# Patient Record
Sex: FEMALE | Race: WHITE | NOT HISPANIC OR LATINO | Employment: UNEMPLOYED | URBAN - NONMETROPOLITAN AREA
[De-identification: names, ages, dates, MRNs, and addresses within clinical notes are randomized per-mention and may not be internally consistent; named-entity substitution may affect disease eponyms.]

---

## 2017-02-16 ENCOUNTER — OFFICE VISIT (OUTPATIENT)
Dept: URGENT CARE | Facility: PHYSICIAN GROUP | Age: 57
End: 2017-02-16
Payer: COMMERCIAL

## 2017-02-16 ENCOUNTER — HOSPITAL ENCOUNTER (OUTPATIENT)
Dept: RADIOLOGY | Facility: MEDICAL CENTER | Age: 57
End: 2017-02-16
Attending: NURSE PRACTITIONER
Payer: COMMERCIAL

## 2017-02-16 ENCOUNTER — APPOINTMENT (OUTPATIENT)
Dept: RADIOLOGY | Facility: IMAGING CENTER | Age: 57
End: 2017-02-16
Attending: PHYSICIAN ASSISTANT
Payer: COMMERCIAL

## 2017-02-16 VITALS
BODY MASS INDEX: 35.86 KG/M2 | WEIGHT: 229 LBS | DIASTOLIC BLOOD PRESSURE: 86 MMHG | SYSTOLIC BLOOD PRESSURE: 144 MMHG | RESPIRATION RATE: 16 BRPM | HEART RATE: 92 BPM | OXYGEN SATURATION: 96 % | TEMPERATURE: 98.3 F

## 2017-02-16 DIAGNOSIS — F17.200 SMOKER: ICD-10-CM

## 2017-02-16 DIAGNOSIS — R05.9 COUGH: ICD-10-CM

## 2017-02-16 DIAGNOSIS — R13.10 DYSPHAGIA, UNSPECIFIED TYPE: ICD-10-CM

## 2017-02-16 DIAGNOSIS — J18.9 PNEUMONIA OF BOTH LUNGS DUE TO INFECTIOUS ORGANISM, UNSPECIFIED PART OF LUNG: ICD-10-CM

## 2017-02-16 DIAGNOSIS — E05.90 HYPERTHYROIDISM: ICD-10-CM

## 2017-02-16 DIAGNOSIS — J44.9 CHRONIC OBSTRUCTIVE PULMONARY DISEASE, UNSPECIFIED COPD TYPE (HCC): ICD-10-CM

## 2017-02-16 PROCEDURE — 99406 BEHAV CHNG SMOKING 3-10 MIN: CPT | Performed by: PHYSICIAN ASSISTANT

## 2017-02-16 PROCEDURE — 71020 DX-CHEST-2 VIEWS: CPT | Mod: TC | Performed by: PHYSICIAN ASSISTANT

## 2017-02-16 PROCEDURE — 76536 US EXAM OF HEAD AND NECK: CPT

## 2017-02-16 PROCEDURE — 99214 OFFICE O/P EST MOD 30 MIN: CPT | Mod: 25 | Performed by: PHYSICIAN ASSISTANT

## 2017-02-16 RX ORDER — METHYLPREDNISOLONE 4 MG/1
TABLET ORAL
Qty: 1 TAB | Refills: 0 | Status: SHIPPED | OUTPATIENT
Start: 2017-02-16 | End: 2017-09-27

## 2017-02-16 RX ORDER — AZITHROMYCIN 250 MG/1
TABLET, FILM COATED ORAL
Qty: 6 TAB | Refills: 0 | Status: SHIPPED | OUTPATIENT
Start: 2017-02-16 | End: 2017-09-27

## 2017-02-16 NOTE — MR AVS SNAPSHOT
Anetteagustin Villegas   2017 5:25 PM   Office Visit   MRN: [id number]    Department:  [facility]   Dept Phone:  [phone]    Description:  Female : 1960   Provider:  Jose Whitney PA-C           Reason for Visit     Cough           Allergies as of 2017     Allergen Noted Reactions    Bee 2009       Penicillins 2009       Soap 2009       Tape 2009       Tetracycline 2009         You were diagnosed with     Cough   [786.2.ICD-9-CM]       Pneumonia of both lungs due to infectious organism, unspecified part of lung   [7554319]         Vital Signs     Blood Pressure Pulse Temperature Respirations Weight Oxygen Saturation    144/86 mmHg 92 36.8 °C (98.3 °F) 16 103.874 kg (229 lb) 96%    Smoking Status                   Current Every Day Smoker           Basic Information     Date Of Birth Sex Race Ethnicity Preferred Language    1960 Female White Non- English      Health Maintenance        Date Due Completion Dates    IMM DTaP/Tdap/Td Vaccine (1 - Tdap) 1981 ---    PAP SMEAR 1983 ---    COLONOSCOPY 2012 ---    IMM INFLUENZA (1) 2016 ---    MAMMOGRAM 12/3/2016 12/3/2015, 2015, 2006            Current Immunizations     No immunizations on file.      Below and/or attached are the medications your provider expects you to take. Review all of your home medications and newly ordered medications with your provider and/or pharmacist. Follow medication instructions as directed by your provider and/or pharmacist. Please keep your medication list with you and share with your provider. Update the information when medications are discontinued, doses are changed, or new medications (including over-the-counter products) are added; and carry medication information at all times in the event of emergency situations     Allergies:  BEE - (reactions not documented)     PENICILLINS - (reactions not documented)     SOAP - (reactions not  documented)     TAPE - (reactions not documented)     TETRACYCLINE - (reactions not documented)               Medications  Valid as of: February 16, 2017 -  7:01 PM    Generic Name Brand Name Tablet Size Instructions for use    Albuterol   Inhale  by mouth.        Albuterol Sulfate (Nebu Soln) PROVENTIL 2.5mg/3ml 3 mL by Nebulization route every four hours as needed for Shortness of Breath.        Albuterol Sulfate (Nebu Soln) PROVENTIL 2.5mg/3ml 3 mL by Nebulization route every four hours as needed for Shortness of Breath.        Azithromycin (Tab) ZITHROMAX 250 MG Take 2 tabs today, then 1 tab daily.        Benazepril HCl (Tab) LOTENSIN 20 MG Take 1 Tab by mouth every day.        Ciprofloxacin HCl (Tab) CIPRO 500 MG Take 1 Tab by mouth 2 times a day.        Cyclobenzaprine HCl (Tab) FLEXERIL 10 MG Take 1 Tab by mouth 2 times a day.        Cyclobenzaprine HCl   Take  by mouth.        Dicyclomine HCl (Tab) BENTYL 20 MG Take 1 Tab by mouth 4 times a day as needed.        Doxepin HCl (Cap) SINEQUAN 25 MG Take 25 mg by mouth every evening.        Fluticasone Propionate (Suspension) FLONASE 50 MCG/ACT Spray 1 Spray in nose every day.        Hydrocodone-Acetaminophen (Tab) NORCO  MG Take 1 Tab by mouth every 6 hours as needed for Mild Pain.        Hydrocodone-Acetaminophen   Take  by mouth.        Lovastatin (Tab) MEVACOR 40 MG Take 1 Tab by mouth every day.        MethIMAzole (Tab) TAPAZOLE 5 MG Take 0.5 Tabs by mouth every day.        MethylPREDNISolone (Tablet Therapy Pack) MEDROL DOSEPAK 4 MG UAD        MetroNIDAZOLE (Tab) FLAGYL 500 MG Take 1 Tab by mouth 3 times a day.        Montelukast Sodium (Tab) SINGULAIR 10 MG Take 10 mg by mouth every day.        Niacin (Antihyperlipidemic) (Tab CR) NIASPAN 500 MG Take 500 mg by mouth every evening.        Nystatin (Suspension) MYCOSTATIN 723347 UNIT/ML Take 500,000 Units by mouth 4 times a day.        Ofloxacin (Solution) OCUFLOX 0.3 % Place 1 Drop in both eyes 4  times a day.        Omeprazole (CAPSULE DELAYED RELEASE) PRILOSEC 20 MG Take 40 mg by mouth every day.        Ondansetron (TABLET DISPERSIBLE) ZOFRAN ODT 8 MG Take 1 Tab by mouth every 8 hours as needed for Nausea/Vomiting.        Ondansetron (TABLET DISPERSIBLE) ZOFRAN ODT 4 MG Take 1 Tab by mouth every 8 hours as needed for Nausea/Vomiting.        PredniSONE (Tab) DELTASONE 10 MG 2 tabs BID x 2 days, 3 tabs daily x 2 days, 2 tabs daily x 2 days, 1 tab daily x 2 days        Terbinafine HCl (Tab) LAMISIL 250 MG Take 250 mg by mouth every day.        .                 Medicines prescribed today were sent to:     [facility] [address] [place] [address] [address] [place] [address]    Phone: [phone] Fax: [phone]    Open 24 Hours?: No    [facility] [address] [place] [address] [address] [place] [address]    Phone: [phone] Fax: [phone]    Open 24 Hours?: No      Medication refill instructions:       If your prescription bottle indicates you have medication refills left, it is not necessary to call your provider’s office. Please contact your pharmacy and they will refill your medication.    If your prescription bottle indicates you do not have any refills left, you may request refills at any time through one of the following ways: The online [employer] system (except Urgent Care), by calling your provider’s office, or by asking your pharmacy to contact your provider’s office with a refill request. Medication refills are processed only during regular business hours and may not be available until the next business day. Your provider may request additional information or to have a follow-up visit with you prior to refilling your medication.   *Please Note: Medication refills are assigned a new Rx number when refilled electronically. Your pharmacy may indicate that no refills were authorized even though a new prescription for the same medication  is available at the pharmacy. Please request the medicine by name with the pharmacy before contacting your provider for a refill.        Your To Do List     Future Labs/Procedures Complete By Expires    DX-CHEST-2 VIEWS  As directed 2/16/2018         Postling Access Code: [email or web address]  Expires: 3/18/2017  7:01 PM    Postling  A secure, online tool to manage your health information     [employer]’s Postling® is a secure, online tool that connects you to your personalized health information from the privacy of your home -- day or night - making it very easy for you to manage your healthcare. Once the activation process is completed, you can even access your medical information using the Postling yeni, which is available for free in the Apple Yeni store or Google Play store.     Postling provides the following levels of access (as shown below):   My Chart Features   [facility] Doctor [facility]  Specialists [facility]  Urgent  Care Non-[facility]  Primary Care  Doctor   Email your healthcare team securely and privately 24/7 X X X    Manage appointments: schedule your next appointment; view details of past/upcoming appointments X      Request prescription refills. X      View recent personal medical records, including lab and immunizations X X X X   View health record, including health history, allergies, medications X X X X   Read reports about your outpatient visits, procedures, consult and ER notes X X X X   See your discharge summary, which is a recap of your hospital and/or ER visit that includes your diagnosis, lab results, and care plan. X X       How to register for Postling:  1. Go to  [email or web address].  2. Click on the Sign Up Now box, which takes you to the New Member Sign Up page. You will need to provide the following information:  a. Enter your Postling Access Code exactly as it appears at the top of this page. (You will not need to use this code after you’ve completed the sign-up process. If you do  not sign up before the expiration date, you must request a new code.)   b. Enter your date of birth.   c. Enter your home email address.   d. Click Submit, and follow the next screen’s instructions.  3. Create a [employer] ID. This will be your [employer] login ID and cannot be changed, so think of one that is secure and easy to remember.  4. Create a [employer] password. You can change your password at any time.  5. Enter your Password Reset Question and Answer. This can be used at a later time if you forget your password.   6. Enter your e-mail address. This allows you to receive e-mail notifications when new information is available in [employer].  7. Click Sign Up. You can now view your health information.    For assistance activating your [employer] account, call [phone]

## 2017-02-17 ASSESSMENT — ENCOUNTER SYMPTOMS
EYE DISCHARGE: 0
FEVER: 1
NAUSEA: 0
NECK PAIN: 0
DIZZINESS: 0
SORE THROAT: 1
HEADACHES: 0
VOMITING: 0
EYE REDNESS: 0
SPUTUM PRODUCTION: 1
CHILLS: 0
ABDOMINAL PAIN: 0
MYALGIAS: 0
COUGH: 1
TINGLING: 0
PALPITATIONS: 0
WHEEZING: 1
HEMOPTYSIS: 0
SHORTNESS OF BREATH: 0

## 2017-02-17 ASSESSMENT — COPD QUESTIONNAIRES: COPD: 1

## 2017-02-18 NOTE — PROGRESS NOTES
"Subjective:      Anette Villegas is a 57 y.o. female who presents with Cough            Cough  This is a new problem. Episode onset: 3 days. The problem has been gradually worsening. The problem occurs every few minutes. The cough is productive of sputum. Associated symptoms include ear congestion, a fever, nasal congestion, a sore throat and wheezing. Pertinent negatives include no chest pain, chills, ear pain, eye redness, headaches, hemoptysis, myalgias, rash or shortness of breath. The symptoms are aggravated by cold air, exercise and dust. She has tried a beta-agonist inhaler and OTC cough suppressant for the symptoms. The treatment provided mild relief. Her past medical history is significant for bronchitis, COPD and pneumonia.   Pt was concerned today as she recently \"got over PNE\".     Review of Systems   Constitutional: Positive for fever and malaise/fatigue. Negative for chills.   HENT: Positive for congestion and sore throat. Negative for ear discharge and ear pain.    Eyes: Negative for discharge and redness.   Respiratory: Positive for cough, sputum production and wheezing. Negative for hemoptysis and shortness of breath.    Cardiovascular: Negative for chest pain, palpitations and leg swelling.   Gastrointestinal: Negative for nausea, vomiting and abdominal pain.   Genitourinary: Negative for dysuria and urgency.   Musculoskeletal: Negative for myalgias and neck pain.   Skin: Negative for itching and rash.   Neurological: Negative for dizziness, tingling and headaches.          Objective:     /86 mmHg  Pulse 92  Temp(Src) 36.8 °C (98.3 °F)  Resp 16  Wt 103.874 kg (229 lb)  SpO2 96%   PMH:  has a past medical history of Arthritis; Personal history of venous thrombosis and embolism (6/23/2009); Dental disorder; Grave's disease; Pain; Hypertension; and Chronic airway obstruction, not elsewhere classified (CMS-HCC).  MEDS:   Current outpatient prescriptions:   •  azithromycin (ZITHROMAX) 250 " MG Tab, Take 2 tabs today, then 1 tab daily., Disp: 6 Tab, Rfl: 0  •  MethylPREDNISolone (MEDROL DOSEPAK) 4 MG Tablet Therapy Pack, UAD, Disp: 1 Tab, Rfl: 0  •  HYDROCODONE-ACETAMINOPHEN PO, Take  by mouth., Disp: , Rfl:   •  Cyclobenzaprine HCl (FLEXERIL PO), Take  by mouth., Disp: , Rfl:   •  montelukast (SINGULAIR) 10 MG Tab, Take 10 mg by mouth every day., Disp: , Rfl:   •  fluticasone (FLONASE) 50 MCG/ACT nasal spray, Spray 1 Spray in nose every day., Disp: , Rfl:   •  Albuterol (VENTOLIN INH), Inhale  by mouth., Disp: , Rfl:   •  niacin SR (NIASPAN) 500 MG Tab CR, Take 500 mg by mouth every evening., Disp: , Rfl:   •  nystatin (MYCOSTATIN) 507803 UNIT/ML Suspension, Take 500,000 Units by mouth 4 times a day., Disp: , Rfl:   •  ofloxacin (OCUFLOX) 0.3 % Solution, Place 1 Drop in both eyes 4 times a day., Disp: , Rfl:   •  doxepin (SINEQUAN) 25 MG Cap, Take 25 mg by mouth every evening., Disp: , Rfl:   •  terbinafine (LAMISIL) 250 MG Tab, Take 250 mg by mouth every day., Disp: , Rfl:   •  ciprofloxacin (CIPRO) 500 MG Tab, Take 1 Tab by mouth 2 times a day., Disp: 20 Tab, Rfl: 0  •  metronidazole (FLAGYL) 500 MG Tab, Take 1 Tab by mouth 3 times a day., Disp: 30 Tab, Rfl: 0  •  ondansetron (ZOFRAN ODT) 4 MG TABLET DISPERSIBLE, Take 1 Tab by mouth every 8 hours as needed for Nausea/Vomiting., Disp: 20 Tab, Rfl: 0  •  albuterol (PROVENTIL) 2.5mg/3ml Nebu Soln solution for nebulization, 3 mL by Nebulization route every four hours as needed for Shortness of Breath., Disp: 75 mL, Rfl: 0  •  predniSONE (DELTASONE) 10 MG Tab, 2 tabs BID x 2 days, 3 tabs daily x 2 days, 2 tabs daily x 2 days, 1 tab daily x 2 days, Disp: 20 Tab, Rfl: 0  •  ondansetron (ZOFRAN ODT) 8 MG TABLET DISPERSIBLE, Take 1 Tab by mouth every 8 hours as needed for Nausea/Vomiting., Disp: 15 Tab, Rfl: 0  •  albuterol (PROVENTIL) 2.5mg/3ml Nebu Soln solution for nebulization, 3 mL by Nebulization route every four hours as needed for Shortness of  Breath., Disp: 75 mL, Rfl: 0  •  dicyclomine (BENTYL) 20 MG TABS, Take 1 Tab by mouth 4 times a day as needed., Disp: 20 Tab, Rfl: 0  •  omeprazole (PRILOSEC) 20 MG CPDR, Take 40 mg by mouth every day., Disp: , Rfl:   •  METHIMAZOLE 5 MG PO TABS, Take 0.5 Tabs by mouth every day., Disp: , Rfl:   •  BENAZEPRIL HCL 20 MG PO TABS, Take 1 Tab by mouth every day., Disp: , Rfl:   •  LOVASTATIN 40 MG PO TABS, Take 1 Tab by mouth every day., Disp: , Rfl:   •  CYCLOBENZAPRINE HCL 10 MG PO TABS, Take 1 Tab by mouth 2 times a day., Disp: , Rfl:   •  HYDROCODONE-ACETAMINOPHEN  MG PO TABS, Take 1 Tab by mouth every 6 hours as needed for Mild Pain., Disp: , Rfl:   ALLERGIES:   Allergies   Allergen Reactions   • Bee    • Penicillins    • Soap    • Tape    • Tetracycline      SURGHX:   Past Surgical History   Procedure Laterality Date   • Tubal ligation  1994   • Hysterectomy, vaginal  1999   • Pr diskectomy,percutaneous lumbar  2001   • Lumbar fusion posterior  2004   • Tonsillectomy  1968   • Acl reconstruction scope  7/9/2009     Performed by [facility]ENEE L[facility]I at [facility]     SOCHX:  reports that she has been smoking Cigarettes.  She has been smoking about 1.00 pack per day. She has never used smokeless tobacco. She reports that she does not drink alcohol or use illicit drugs.  FH: Family history was reviewed, no pertinent findings to report    Physical Exam   Constitutional: She is oriented to person, place, and time. She appears well-developed and well-nourished.   HENT:   Head: Normocephalic and atraumatic.   Right Ear: External ear normal.   Left Ear: External ear normal.   Nose: Nose normal.   Mouth/Throat: Oropharynx is clear and moist. No oropharyngeal exudate.   Eyes: EOM are normal. Pupils are equal, round, and reactive to light.   Neck: Normal range of motion. Neck supple.   Cardiovascular: Normal rate and regular rhythm.    No murmur heard.  Pulmonary/Chest: Effort normal. No respiratory distress.    Diffuse wheezing, with noted rhonchi to LLL field.    Musculoskeletal: Normal range of motion.   Lymphadenopathy:     She has no cervical adenopathy.   Neurological: She is oriented to person, place, and time.   Skin: Skin is warm. No rash noted.   Psychiatric: She has a normal mood and affect. Her behavior is normal.   Vitals reviewed.       CXR:    The cardiomediastinal silhouette is stable. No focal consolidation, pleural effusion or pneumothorax is identified.  Costophrenic angles are sharp. There is hazy retrocardiac airspace opacity.     Assessment/Plan:     1. Pneumonia of both lungs due to infectious organism, unspecified part of lung  - azithromycin (ZITHROMAX) 250 MG Tab; Take 2 tabs today, then 1 tab daily.  Dispense: 6 Tab; Refill: 0  - MethylPREDNISolone (MEDROL DOSEPAK) 4 MG Tablet Therapy Pack; UAD  Dispense: 1 Tab; Refill: 0    2. Cough  - DX-CHEST-2 VIEWS; Future  - azithromycin (ZITHROMAX) 250 MG Tab; Take 2 tabs today, then 1 tab daily.  Dispense: 6 Tab; Refill: 0  - MethylPREDNISolone (MEDROL DOSEPAK) 4 MG Tablet Therapy Pack; UAD  Dispense: 1 Tab; Refill: 0    3. Chronic obstructive pulmonary disease, unspecified COPD type (CMS-HCC)  4. Smoker  Smoking cessation was discussed today for longer than 3 min. OTC Smoking cessation aids were discussed and pt. will consider such, however is not ready to quit at this time.     Will treat with Zpak, steroids, and pt. Is to continue with the use of home nebulizers. Pt. Is to keep appt. With PCP next week for recheck.   Patient given precautionary s/sx that mandate immediate follow up and evaluation in the ED. Advised of risks of not doing so.    DDX, Supportive care, and indications for immediate follow-up discussed with patient.    Instructed to return to clinic or nearest emergency department if we are not available for any change in condition, further concerns, or worsening of symptoms.    The patient demonstrated a good understanding and agreed with  the treatment plan.

## 2017-03-02 ENCOUNTER — OFFICE VISIT (OUTPATIENT)
Dept: URGENT CARE | Facility: PHYSICIAN GROUP | Age: 57
End: 2017-03-02
Payer: COMMERCIAL

## 2017-03-02 ENCOUNTER — HOSPITAL ENCOUNTER (OUTPATIENT)
Dept: RADIOLOGY | Facility: MEDICAL CENTER | Age: 57
End: 2017-03-02
Attending: EMERGENCY MEDICINE
Payer: COMMERCIAL

## 2017-03-02 VITALS
BODY MASS INDEX: 35.86 KG/M2 | WEIGHT: 229 LBS | DIASTOLIC BLOOD PRESSURE: 80 MMHG | OXYGEN SATURATION: 98 % | RESPIRATION RATE: 16 BRPM | TEMPERATURE: 97.3 F | HEART RATE: 104 BPM | SYSTOLIC BLOOD PRESSURE: 134 MMHG

## 2017-03-02 DIAGNOSIS — R05.9 COUGH: ICD-10-CM

## 2017-03-02 DIAGNOSIS — J01.01 ACUTE RECURRENT MAXILLARY SINUSITIS: ICD-10-CM

## 2017-03-02 DIAGNOSIS — F17.210 CIGARETTE SMOKER: ICD-10-CM

## 2017-03-02 DIAGNOSIS — Z87.01 HISTORY OF PNEUMONIA: ICD-10-CM

## 2017-03-02 DIAGNOSIS — J45.31 MILD PERSISTENT ASTHMA WITH ACUTE EXACERBATION: ICD-10-CM

## 2017-03-02 PROCEDURE — 99214 OFFICE O/P EST MOD 30 MIN: CPT | Performed by: EMERGENCY MEDICINE

## 2017-03-02 PROCEDURE — 71020 DX-CHEST-2 VIEWS: CPT

## 2017-03-02 RX ORDER — FLUTICASONE PROPIONATE 220 UG/1
1 AEROSOL, METERED RESPIRATORY (INHALATION) 2 TIMES DAILY
Qty: 1 INHALER | Refills: 0 | Status: SHIPPED | OUTPATIENT
Start: 2017-03-02 | End: 2018-11-17

## 2017-03-02 RX ORDER — PREDNISONE 20 MG/1
40 TABLET ORAL DAILY
Qty: 14 TAB | Refills: 0 | Status: SHIPPED | OUTPATIENT
Start: 2017-03-02 | End: 2017-03-09

## 2017-03-02 RX ORDER — SULFAMETHOXAZOLE AND TRIMETHOPRIM 800; 160 MG/1; MG/1
1 TABLET ORAL 2 TIMES DAILY
Qty: 14 TAB | Refills: 0 | Status: SHIPPED | OUTPATIENT
Start: 2017-03-02 | End: 2017-03-09

## 2017-03-02 ASSESSMENT — ENCOUNTER SYMPTOMS
SWEATS: 0
CHILLS: 0
SORE THROAT: 1
SPUTUM PRODUCTION: 1
HEADACHES: 1
HEARTBURN: 0
WEIGHT LOSS: 0
RHINORRHEA: 1
FEVER: 0
ABDOMINAL PAIN: 0
VOMITING: 0
DIARRHEA: 0
SHORTNESS OF BREATH: 1
NAUSEA: 0
HEMOPTYSIS: 0
COUGH: 1
WHEEZING: 1

## 2017-03-02 NOTE — PATIENT INSTRUCTIONS
Avoid smoking!  Cessation is highly recommended, but at least attempt to reduce frequency of smoking until the illness resolves.  Use saline nasal irrigation, such as with a Neti Pot, as needed daily for relief of nasal or sinus congestion relief.  Continue inhaled nasal steroid (Flonase, Nasonex, Rhinocort, Nasacort) daily; continue for at least 2-3 weeks.   Use an oral probiotic daily, such as Culturelle, Align, or yogurt to reduce gastrointestinal symptoms.  You should contact a primary care provider for follow-up as soon as available.  Go to the nearest hospital Emergency Department, or call 911, if any worsening condition.    Smoking Cessation, Tips for Success  If you are ready to quit smoking, congratulations! You have chosen to help yourself be healthier. Cigarettes bring nicotine, tar, carbon monoxide, and other irritants into your body. Your lungs, heart, and blood vessels will be able to work better without these poisons. There are many different ways to quit smoking. Nicotine gum, nicotine patches, a nicotine inhaler, or nicotine nasal spray can help with physical craving. Hypnosis, support groups, and medicines help break the habit of smoking.  WHAT THINGS CAN I DO TO MAKE QUITTING EASIER?   Here are some tips to help you quit for good:  · Pick a date when you will quit smoking completely. Tell all of your friends and family about your plan to quit on that date.  · Do not try to slowly cut down on the number of cigarettes you are smoking. Pick a quit date and quit smoking completely starting on that day.  · Throw away all cigarettes.    · Clean and remove all ashtrays from your home, work, and car.  · On a card, write down your reasons for quitting. Carry the card with you and read it when you get the urge to smoke.  · Cleanse your body of nicotine. Drink enough water and fluids to keep your urine clear or pale yellow. Do this after quitting to flush the nicotine from your body.  · Learn to predict your  "moods. Do not let a bad situation be your excuse to have a cigarette. Some situations in your life might tempt you into wanting a cigarette.  · Never have \"just one\" cigarette. It leads to wanting another and another. Remind yourself of your decision to quit.  · Change habits associated with smoking. If you smoked while driving or when feeling stressed, try other activities to replace smoking. Stand up when drinking your coffee. Brush your teeth after eating. Sit in a different chair when you read the paper. Avoid alcohol while trying to quit, and try to drink fewer caffeinated beverages. Alcohol and caffeine may urge you to smoke.  · Avoid foods and drinks that can trigger a desire to smoke, such as sugary or spicy foods and alcohol.  · Ask people who smoke not to smoke around you.  · Have something planned to do right after eating or having a cup of coffee. For example, plan to take a walk or exercise.  · Try a relaxation exercise to calm you down and decrease your stress. Remember, you may be tense and nervous for the first 2 weeks after you quit, but this will pass.  · Find new activities to keep your hands busy. Play with a pen, coin, or rubber band. Doodle or draw things on paper.  · Brush your teeth right after eating. This will help cut down on the craving for the taste of tobacco after meals. You can also try mouthwash.    · Use oral substitutes in place of cigarettes. Try using lemon drops, carrots, cinnamon sticks, or chewing gum. Keep them handy so they are available when you have the urge to smoke.  · When you have the urge to smoke, try deep breathing.  · Designate your home as a nonsmoking area.  · If you are a heavy smoker, ask your health care provider about a prescription for nicotine chewing gum. It can ease your withdrawal from nicotine.  · Reward yourself. Set aside the cigarette money you save and buy yourself something nice.  · Look for support from others. Join a support group or smoking " "cessation program. Ask someone at home or at work to help you with your plan to quit smoking.  · Always ask yourself, \"Do I need this cigarette or is this just a reflex?\" Tell yourself, \"Today, I choose not to smoke,\" or \"I do not want to smoke.\" You are reminding yourself of your decision to quit.  · Do not replace cigarette smoking with electronic cigarettes (commonly called e-cigarettes). The safety of e-cigarettes is unknown, and some may contain harmful chemicals.  · If you relapse, do not give up! Plan ahead and think about what you will do the next time you get the urge to smoke.  HOW WILL I FEEL WHEN I QUIT SMOKING?  You may have symptoms of withdrawal because your body is used to nicotine (the addictive substance in cigarettes). You may crave cigarettes, be irritable, feel very hungry, cough often, get headaches, or have difficulty concentrating. The withdrawal symptoms are only temporary. They are strongest when you first quit but will go away within 10-14 days. When withdrawal symptoms occur, stay in control. Think about your reasons for quitting. Remind yourself that these are signs that your body is healing and getting used to being without cigarettes. Remember that withdrawal symptoms are easier to treat than the major diseases that smoking can cause.   Even after the withdrawal is over, expect periodic urges to smoke. However, these cravings are generally short lived and will go away whether you smoke or not. Do not smoke!  WHAT RESOURCES ARE AVAILABLE TO HELP ME QUIT SMOKING?  Your health care provider can direct you to community resources or hospitals for support, which may include:  · Group support.  · Education.  · Hypnosis.  · Therapy.     This information is not intended to replace advice given to you by your health care provider. Make sure you discuss any questions you have with your health care provider.     Document Released: 09/15/2005 Document Revised: 01/08/2016 Document Reviewed: " 06/05/2014  [employer] Interactive Patient Education ©2016 [employer]  Sinusitis, Adult  Sinusitis is redness, soreness, and inflammation of the paranasal sinuses. Paranasal sinuses are air pockets within the bones of your face. They are located beneath your eyes, in the middle of your forehead, and above your eyes. In healthy paranasal sinuses, mucus is able to drain out, and air is able to circulate through them by way of your nose. However, when your paranasal sinuses are inflamed, mucus and air can become trapped. This can allow bacteria and other germs to grow and cause infection.  Sinusitis can develop quickly and last only a short time (acute) or continue over a long period (chronic). Sinusitis that lasts for more than 12 weeks is considered chronic.  CAUSES  Causes of sinusitis include:  · Allergies.  · Structural abnormalities, such as displacement of the cartilage that separates your nostrils (deviated septum), which can decrease the air flow through your nose and sinuses and affect sinus drainage.  · Functional abnormalities, such as when the small hairs (cilia) that line your sinuses and help remove mucus do not work properly or are not present.  SIGNS AND SYMPTOMS  Symptoms of acute and chronic sinusitis are the same. The primary symptoms are pain and pressure around the affected sinuses. Other symptoms include:  · Upper toothache.  · Earache.  · Headache.  · Bad breath.  · Decreased sense of smell and taste.  · A cough, which worsens when you are lying flat.  · Fatigue.  · Fever.  · Thick drainage from your nose, which often is green and may contain pus (purulent).  · Swelling and warmth over the affected sinuses.  DIAGNOSIS  Your health care provider will perform a physical exam. During your exam, your health care provider may perform any of the following to help determine if you have acute sinusitis or chronic sinusitis:  · Look in your nose for signs of abnormal growths in your nostrils (nasal  polyps).  · Tap over the affected sinus to check for signs of infection.  · View the inside of your sinuses using an imaging device that has a light attached (endoscope).  If your health care provider suspects that you have chronic sinusitis, one or more of the following tests may be recommended:  · Allergy tests.  · Nasal culture. A sample of mucus is taken from your nose, sent to a lab, and screened for bacteria.  · Nasal cytology. A sample of mucus is taken from your nose and examined by your health care provider to determine if your sinusitis is related to an allergy.  TREATMENT  Most cases of acute sinusitis are related to a viral infection and will resolve on their own within 10 days. Sometimes, medicines are prescribed to help relieve symptoms of both acute and chronic sinusitis. These may include pain medicines, decongestants, nasal steroid sprays, or saline sprays.  However, for sinusitis related to a bacterial infection, your health care provider will prescribe antibiotic medicines. These are medicines that will help kill the bacteria causing the infection.  Rarely, sinusitis is caused by a fungal infection. In these cases, your health care provider will prescribe antifungal medicine.  For some cases of chronic sinusitis, surgery is needed. Generally, these are cases in which sinusitis recurs more than 3 times per year, despite other treatments.  HOME CARE INSTRUCTIONS  · Drink plenty of water. Water helps thin the mucus so your sinuses can drain more easily.  · Use a humidifier.  · Inhale steam 3-4 times a day (for example, sit in the bathroom with the shower running).  · Apply a warm, moist washcloth to your face 3-4 times a day, or as directed by your health care provider.  · Use saline nasal sprays to help moisten and clean your sinuses.  · Take medicines only as directed by your health care provider.  · If you were prescribed either an antibiotic or antifungal medicine, finish it all even if you start  to feel better.  SEEK IMMEDIATE MEDICAL CARE IF:  · You have increasing pain or severe headaches.  · You have nausea, vomiting, or drowsiness.  · You have swelling around your face.  · You have vision problems.  · You have a stiff neck.  · You have difficulty breathing.     This information is not intended to replace advice given to you by your health care provider. Make sure you discuss any questions you have with your health care provider.     Document Released: 12/18/2006 Document Revised: 01/08/2016 Document Reviewed: 01/01/2013  [employer] Interactive Patient Education ©2016 [employer]    Asthma, Acute Bronchospasm  Acute bronchospasm caused by asthma is also referred to as an asthma attack. Bronchospasm means your air passages become narrowed. The narrowing is caused by inflammation and tightening of the muscles in the air tubes (bronchi) in your lungs. This can make it hard to breathe or cause you to wheeze and cough.  CAUSES  Possible triggers are:  · Animal dander from the skin, hair, or feathers of animals.  · Dust mites contained in house dust.  · Cockroaches.  · Pollen from trees or grass.  · Mold.  · Cigarette or tobacco smoke.  · Air pollutants such as dust, household , hair sprays, aerosol sprays, paint fumes, strong chemicals, or strong odors.  · Cold air or weather changes. Cold air may trigger inflammation. Winds increase molds and pollens in the air.  · Strong emotions such as crying or laughing hard.  · Stress.  · Certain medicines such as aspirin or beta-blockers.  · Sulfites in foods and drinks, such as dried fruits and wine.  · Infections or inflammatory conditions, such as a flu, cold, or inflammation of the nasal membranes (rhinitis).  · Gastroesophageal reflux disease (GERD). GERD is a condition where stomach acid backs up into your esophagus.  · Exercise or strenuous activity.  SIGNS AND SYMPTOMS   · Wheezing.  · Excessive coughing, particularly at night.  · Chest  tightness.  · Shortness of breath.  DIAGNOSIS   Your health care provider will ask you about your medical history and perform a physical exam. A chest X-ray or blood testing may be performed to look for other causes of your symptoms or other conditions that may have triggered your asthma attack.   TREATMENT   Treatment is aimed at reducing inflammation and opening up the airways in your lungs.  Most asthma attacks are treated with inhaled medicines. These include quick relief or rescue medicines (such as bronchodilators) and controller medicines (such as inhaled corticosteroids). These medicines are sometimes given through an inhaler or a nebulizer. Systemic steroid medicine taken by mouth or given through an IV tube also can be used to reduce the inflammation when an attack is moderate or severe. Antibiotic medicines are only used if a bacterial infection is present.   HOME CARE INSTRUCTIONS   · Rest.  · Drink plenty of liquids. This helps the mucus to remain thin and be easily coughed up. Only use caffeine in moderation and do not use alcohol until you have recovered from your illness.  · Do not smoke. Avoid being exposed to secondhand smoke.  · You play a critical role in keeping yourself in good health. Avoid exposure to things that cause you to wheeze or to have breathing problems.  · Keep your medicines up-to-date and available. Carefully follow your health care provider's treatment plan.  · Take your medicine exactly as prescribed.  · When pollen or pollution is bad, keep windows closed and use an air conditioner or go to places with air conditioning.  · Asthma requires careful medical care. See your health care provider for a follow-up as advised. If you are more than 24 weeks pregnant and you were prescribed any new medicines, let your obstetrician know about the visit and how you are doing. Follow up with your health care provider as directed.  · After you have recovered from your asthma attack, make an  appointment with your outpatient doctor to talk about ways to reduce the likelihood of future attacks. If you do not have a doctor who manages your asthma, make an appointment with a primary care doctor to discuss your asthma.  SEEK IMMEDIATE MEDICAL CARE IF:   · You are getting worse.  · You have trouble breathing. If severe, call your local emergency services (911 in the [place]  · You develop chest pain or discomfort.  · You are vomiting.  · You are not able to keep fluids down.  · You are coughing up yellow, green, brown, or bloody sputum.  · You have a fever and your symptoms suddenly get worse.  · You have trouble swallowing.  MAKE SURE YOU:   · Understand these instructions.  · Will watch your condition.  · Will get help right away if you are not doing well or get worse.     This information is not intended to replace advice given to you by your health care provider. Make sure you discuss any questions you have with your health care provider.     Document Released: 04/03/2008 Document Revised: 12/23/2014 Document Reviewed: 06/25/2014  [employer] Interactive Patient Education ©2016 [employer]

## 2017-03-03 NOTE — PROGRESS NOTES
Subjective:      Anette Villegas is a 57 y.o. female who presents with Follow-Up            Cough  Episode onset: 3 months. The problem has been waxing and waning. The cough is productive of purulent sputum. Associated symptoms include headaches, nasal congestion, postnasal drip, rhinorrhea, a sore throat, shortness of breath and wheezing. Pertinent negatives include no chest pain, chills, ear congestion, ear pain, fever, heartburn, hemoptysis, rash, sweats or weight loss. The symptoms are aggravated by lying down. Risk factors for lung disease include smoking/tobacco exposure. She has tried a beta-agonist inhaler and oral steroids (z-shahzad) for the symptoms. The treatment provided mild relief. Her past medical history is significant for asthma, bronchitis, environmental allergies and pneumonia.    notes partial improvement on azithromycin from last visit; was advised that chest x-ray was not normal. States PCP advised follow-up for repeat chest x-ray   Review of Systems   Constitutional: Negative for fever, chills and weight loss.   HENT: Positive for congestion, postnasal drip, rhinorrhea and sore throat. Negative for ear discharge, ear pain and nosebleeds.    Respiratory: Positive for cough, sputum production, shortness of breath and wheezing. Negative for hemoptysis.    Cardiovascular: Negative for chest pain and leg swelling.   Gastrointestinal: Negative for heartburn, nausea, vomiting, abdominal pain and diarrhea.   Skin: Negative for rash.   Neurological: Positive for headaches.   Endo/Heme/Allergies: Positive for environmental allergies.     PMH:  has a past medical history of Arthritis; Personal history of venous thrombosis and embolism (6/23/2009); Dental disorder; Grave's disease; Pain; Hypertension; and Chronic airway obstruction, not elsewhere classified (CMS-HCC).  MEDS:   Current outpatient prescriptions:   •  sulfamethoxazole-trimethoprim (BACTRIM DS) 800-160 MG tablet, Take 1 Tab by mouth 2 times a  day for 7 days., Disp: 14 Tab, Rfl: 0  •  predniSONE (DELTASONE) 20 MG Tab, Take 2 Tabs by mouth every day for 7 days., Disp: 14 Tab, Rfl: 0  •  fluticasone (FLOVENT HFA) 220 MCG/ACT Aerosol, Inhale 1 Puff by mouth 2 times a day., Disp: 1 Inhaler, Rfl: 0  •  azithromycin (ZITHROMAX) 250 MG Tab, Take 2 tabs today, then 1 tab daily., Disp: 6 Tab, Rfl: 0  •  MethylPREDNISolone (MEDROL DOSEPAK) 4 MG Tablet Therapy Pack, UAD, Disp: 1 Tab, Rfl: 0  •  HYDROCODONE-ACETAMINOPHEN PO, Take  by mouth., Disp: , Rfl:   •  Cyclobenzaprine HCl (FLEXERIL PO), Take  by mouth., Disp: , Rfl:   •  montelukast (SINGULAIR) 10 MG Tab, Take 10 mg by mouth every day., Disp: , Rfl:   •  fluticasone (FLONASE) 50 MCG/ACT nasal spray, Spray 1 Spray in nose every day., Disp: , Rfl:   •  Albuterol (VENTOLIN INH), Inhale  by mouth., Disp: , Rfl:   •  niacin SR (NIASPAN) 500 MG Tab CR, Take 500 mg by mouth every evening., Disp: , Rfl:   •  nystatin (MYCOSTATIN) 407585 UNIT/ML Suspension, Take 500,000 Units by mouth 4 times a day., Disp: , Rfl:   •  ofloxacin (OCUFLOX) 0.3 % Solution, Place 1 Drop in both eyes 4 times a day., Disp: , Rfl:   •  doxepin (SINEQUAN) 25 MG Cap, Take 25 mg by mouth every evening., Disp: , Rfl:   •  terbinafine (LAMISIL) 250 MG Tab, Take 250 mg by mouth every day., Disp: , Rfl:   •  ciprofloxacin (CIPRO) 500 MG Tab, Take 1 Tab by mouth 2 times a day., Disp: 20 Tab, Rfl: 0  •  metronidazole (FLAGYL) 500 MG Tab, Take 1 Tab by mouth 3 times a day., Disp: 30 Tab, Rfl: 0  •  ondansetron (ZOFRAN ODT) 4 MG TABLET DISPERSIBLE, Take 1 Tab by mouth every 8 hours as needed for Nausea/Vomiting., Disp: 20 Tab, Rfl: 0  •  albuterol (PROVENTIL) 2.5mg/3ml Nebu Soln solution for nebulization, 3 mL by Nebulization route every four hours as needed for Shortness of Breath., Disp: 75 mL, Rfl: 0  •  predniSONE (DELTASONE) 10 MG Tab, 2 tabs BID x 2 days, 3 tabs daily x 2 days, 2 tabs daily x 2 days, 1 tab daily x 2 days, Disp: 20 Tab, Rfl: 0  •   ondansetron (ZOFRAN ODT) 8 MG TABLET DISPERSIBLE, Take 1 Tab by mouth every 8 hours as needed for Nausea/Vomiting., Disp: 15 Tab, Rfl: 0  •  albuterol (PROVENTIL) 2.5mg/3ml Nebu Soln solution for nebulization, 3 mL by Nebulization route every four hours as needed for Shortness of Breath., Disp: 75 mL, Rfl: 0  •  dicyclomine (BENTYL) 20 MG TABS, Take 1 Tab by mouth 4 times a day as needed., Disp: 20 Tab, Rfl: 0  •  omeprazole (PRILOSEC) 20 MG CPDR, Take 40 mg by mouth every day., Disp: , Rfl:   •  METHIMAZOLE 5 MG PO TABS, Take 0.5 Tabs by mouth every day., Disp: , Rfl:   •  BENAZEPRIL HCL 20 MG PO TABS, Take 1 Tab by mouth every day., Disp: , Rfl:   •  LOVASTATIN 40 MG PO TABS, Take 1 Tab by mouth every day., Disp: , Rfl:   •  CYCLOBENZAPRINE HCL 10 MG PO TABS, Take 1 Tab by mouth 2 times a day., Disp: , Rfl:   •  HYDROCODONE-ACETAMINOPHEN  MG PO TABS, Take 1 Tab by mouth every 6 hours as needed for Mild Pain., Disp: , Rfl:   ALLERGIES:   Allergies   Allergen Reactions   • Bee    • Penicillins    • Soap    • Tape    • Tetracycline      SURGHX:   Past Surgical History   Procedure Laterality Date   • Tubal ligation  1994   • Hysterectomy, vaginal  1999   • Pr diskectomy,percutaneous lumbar  2001   • Lumbar fusion posterior  2004   • Tonsillectomy  1968   • Acl reconstruction scope  7/9/2009     Performed by RENEE LEVI at SURGERY [facility]     SOCHX:  reports that she has been smoking Cigarettes.  She has been smoking about 1.00 pack per day. She has never used smokeless tobacco. She reports that she does not drink alcohol or use illicit drugs.  FH: family history includes Cancer in her maternal aunt and maternal grandmother.       Objective:     /80 mmHg  Pulse 104  Temp(Src) 36.3 °C (97.3 °F)  Resp 16  Wt 103.874 kg (229 lb)  SpO2 98%     Physical Exam   Constitutional: She appears well-developed and well-nourished. She is cooperative.  Non-toxic appearance. She does not have a sickly  appearance. No distress.   HENT:   Right Ear: Tympanic membrane and ear canal normal.   Left Ear: Tympanic membrane and ear canal normal.   Nose: Mucosal edema and rhinorrhea present. No nasal deformity. Right sinus exhibits maxillary sinus tenderness. Left sinus exhibits maxillary sinus tenderness.   Mouth/Throat: Uvula is midline. No trismus in the jaw. No uvula swelling. Posterior oropharyngeal erythema present. No oropharyngeal exudate or posterior oropharyngeal edema.   Eyes: Conjunctivae are normal.   Neck: Trachea normal and phonation normal. Neck supple. No JVD present.   Cardiovascular: Normal rate, regular rhythm and normal heart sounds.    No murmur heard.  Pulmonary/Chest: Effort normal. She has decreased breath sounds in the right lower field. She has no wheezes. She has no rhonchi. She has no rales.   Abdominal: She exhibits no distension.   Musculoskeletal:   No significant pedal edema.  No calf tenderness.   Lymphadenopathy:     She has no cervical adenopathy.   Neurological: She is alert.   Skin: Skin is warm and dry.   Psychiatric: She has a normal mood and affect.               Assessment/Plan:     1. Mild persistent asthma with acute exacerbation  Advised follow-up with PCP as well as available.  Continue metered-dose inhaler or nebulized albuterol as directed.  - predniSONE (DELTASONE) 20 MG Tab; Take 2 Tabs by mouth every day for 7 days.  Dispense: 14 Tab; Refill: 0  - fluticasone (FLOVENT HFA) 220 MCG/ACT Aerosol; Inhale 1 Puff by mouth 2 times a day.  Dispense: 1 Inhaler; Refill: 0    2. Acute recurrent maxillary sinusitis  Due to recurrence; notes tolerance of Bactrim in the past  - sulfamethoxazole-trimethoprim (BACTRIM DS) 800-160 MG tablet; Take 1 Tab by mouth 2 times a day for 7 days.  Dispense: 14 Tab; Refill: 0    3. History of pneumonia    4. Cough  - DX-CHEST-2 VIEWS; per radiologist:  No active disease.    5. Cigarette smoker  Advised cessation or avoidance

## 2017-09-18 ENCOUNTER — APPOINTMENT (OUTPATIENT)
Dept: RADIOLOGY | Facility: IMAGING CENTER | Age: 57
End: 2017-09-18
Attending: PHYSICIAN ASSISTANT
Payer: COMMERCIAL

## 2017-09-18 ENCOUNTER — OCCUPATIONAL MEDICINE (OUTPATIENT)
Dept: URGENT CARE | Facility: PHYSICIAN GROUP | Age: 57
End: 2017-09-18
Payer: COMMERCIAL

## 2017-09-18 VITALS
HEART RATE: 98 BPM | TEMPERATURE: 98.3 F | WEIGHT: 232.8 LBS | BODY MASS INDEX: 35.28 KG/M2 | SYSTOLIC BLOOD PRESSURE: 122 MMHG | DIASTOLIC BLOOD PRESSURE: 70 MMHG | OXYGEN SATURATION: 96 % | HEIGHT: 68 IN | RESPIRATION RATE: 16 BRPM

## 2017-09-18 DIAGNOSIS — S69.92XA INJURY OF LEFT THUMB, INITIAL ENCOUNTER: ICD-10-CM

## 2017-09-18 PROCEDURE — 99203 OFFICE O/P NEW LOW 30 MIN: CPT | Mod: 29 | Performed by: PHYSICIAN ASSISTANT

## 2017-09-18 PROCEDURE — 73130 X-RAY EXAM OF HAND: CPT | Mod: 26,LT,29 | Performed by: PHYSICIAN ASSISTANT

## 2017-09-18 ASSESSMENT — PAIN SCALES - GENERAL: PAINLEVEL: 4=SLIGHT-MODERATE PAIN

## 2017-09-18 NOTE — PROGRESS NOTES
Chief Complaint   Patient presents with   • Pain     left thumb       HISTORY OF PRESENT ILLNESS: Patient is a 57 y.o. female who presents today for the following:    Patient comes in for evaluation of an injury sustained at work today. DOI: 9/18/17. Patient was performing defensive tactic drills when she hyperextended her left thumb, causing immediate pain. Patient describes a numbness in her left thumb and worsening pain with certain movements. She states it feels as though it is dislocated. She has taken Excedrin Migraine today and is on chronic pain medication. Patient denies any history of injury to this thumb in the past and has no other places of employment.    There are no active problems to display for this patient.      Allergies:Bee; Penicillins; Soap; Tape; and Tetracycline    Current Outpatient Prescriptions Ordered in [facility]   Medication Sig Dispense Refill   • fluticasone (FLOVENT HFA) 220 MCG/ACT Aerosol Inhale 1 Puff by mouth 2 times a day. 1 Inhaler 0   • Cyclobenzaprine HCl (FLEXERIL PO) Take  by mouth.     • doxepin (SINEQUAN) 25 MG Cap Take 25 mg by mouth every evening.     • dicyclomine (BENTYL) 20 MG TABS Take 1 Tab by mouth 4 times a day as needed. 20 Tab 0   • METHIMAZOLE 5 MG PO TABS Take 0.5 Tabs by mouth every day.     • BENAZEPRIL HCL 20 MG PO TABS Take 1 Tab by mouth every day.     • LOVASTATIN 40 MG PO TABS Take 1 Tab by mouth every day.     • CYCLOBENZAPRINE HCL 10 MG PO TABS Take 1 Tab by mouth 2 times a day.     • HYDROCODONE-ACETAMINOPHEN  MG PO TABS Take 1 Tab by mouth every 6 hours as needed for Mild Pain.     • azithromycin (ZITHROMAX) 250 MG Tab Take 2 tabs today, then 1 tab daily. 6 Tab 0   • MethylPREDNISolone (MEDROL DOSEPAK) 4 MG Tablet Therapy Pack UAD 1 Tab 0   • HYDROCODONE-ACETAMINOPHEN PO Take  by mouth.     • montelukast (SINGULAIR) 10 MG Tab Take 10 mg by mouth every day.     • fluticasone (FLONASE) 50 MCG/ACT nasal spray Spray 1 Spray in nose every day.     •  Albuterol (VENTOLIN INH) Inhale  by mouth.     • niacin SR (NIASPAN) 500 MG Tab CR Take 500 mg by mouth every evening.     • nystatin (MYCOSTATIN) 676628 UNIT/ML Suspension Take 500,000 Units by mouth 4 times a day.     • ofloxacin (OCUFLOX) 0.3 % Solution Place 1 Drop in both eyes 4 times a day.     • terbinafine (LAMISIL) 250 MG Tab Take 250 mg by mouth every day.     • ciprofloxacin (CIPRO) 500 MG Tab Take 1 Tab by mouth 2 times a day. 20 Tab 0   • metronidazole (FLAGYL) 500 MG Tab Take 1 Tab by mouth 3 times a day. 30 Tab 0   • ondansetron (ZOFRAN ODT) 4 MG TABLET DISPERSIBLE Take 1 Tab by mouth every 8 hours as needed for Nausea/Vomiting. 20 Tab 0   • albuterol (PROVENTIL) 2.5mg/3ml Nebu Soln solution for nebulization 3 mL by Nebulization route every four hours as needed for Shortness of Breath. 75 mL 0   • predniSONE (DELTASONE) 10 MG Tab 2 tabs BID x 2 days, 3 tabs daily x 2 days, 2 tabs daily x 2 days, 1 tab daily x 2 days 20 Tab 0   • ondansetron (ZOFRAN ODT) 8 MG TABLET DISPERSIBLE Take 1 Tab by mouth every 8 hours as needed for Nausea/Vomiting. 15 Tab 0   • albuterol (PROVENTIL) 2.5mg/3ml Nebu Soln solution for nebulization 3 mL by Nebulization route every four hours as needed for Shortness of Breath. 75 mL 0   • omeprazole (PRILOSEC) 20 MG CPDR Take 40 mg by mouth every day.       No current [facility]-ordered facility-administered medications on file.        Past Medical History:   Diagnosis Date   • Personal history of venous thrombosis and embolism 6/23/2009    right leg   • Arthritis    • Chronic airway obstruction, not elsewhere classified    • Dental disorder     dentures   • Grave's disease    • Hypertension    • Pain     lower back       Social History   Substance Use Topics   • Smoking status: Current Every Day Smoker     Packs/day: 1.00     Types: Cigarettes   • Smokeless tobacco: Never Used   • Alcohol use No       Family Status   Relation Status   • Maternal Grandmother    • Maternal Aunt   "    Family History   Problem Relation Age of Onset   • Cancer Maternal Grandmother    • Cancer Maternal Aunt        ROS:    Review of Systems   Constitutional: Negative for fever, chills, weight loss and malaise/fatigue.     Exam:  Blood pressure 122/70, pulse 98, temperature 36.8 °C (98.3 °F), resp. rate 16, height 1.727 m (5' 8\"), weight 105.6 kg (232 lb 12.8 oz), SpO2 96 %.  General: Well developed, well nourished. No distress.  HEENT: Head is grossly normal.  Pulmonary: No respiratory distress noted.  Cardiovascular: Cap refill is less than 2 seconds in the left thumb.  Neurologic: No sensory deficit noted on exam.  Extremities: Trace edema and ecchymosis noted at the first MCP of the left hand. Full range of motion noted.  Skin: Warm, dry, good turgor. No rashes in visible areas.   Psych: Normal mood. Alert and oriented x3. Judgment and insight is normal.    Left hand x-ray, per radiology:    No radiographic evidence of acute traumatic injury.    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 Given this description, patient will not likely need any restrictions, however, given the location of her employment, patient should avoid any potential prisoner contact until thumb is completely healed as fire arm handling may be affected by pain. See D39 for restrictions. RTC in one week for re-evaluation, sooner for any changes in symptoms. Continue chronic pain medication as directed.  1. Injury of left thumb, initial encounter  DX-HAND 3+ LEFT       "

## 2017-09-18 NOTE — LETTER
"[facility] [place] [place] [address]  Phone: [phone] - Fax: [phone]        [facility] Progress Report and Disability Certification  Date of Service: 9/18/2017   No Show:  No  Date / Time of Next Visit: 09/27/2017   Claim Information   Patient Name: Anette Villegas  Claim Number:     Employer: [employer]  Date of Injury: 9/18/2017     Insurer / TPA: [employer]  ID / SSN:     Occupation:   Diagnosis: The encounter diagnosis was Injury of left thumb, initial encounter.    Medical Information   Related to Industrial Injury? Yes    Subjective Complaints:  Patient comes in for evaluation of an injury sustained at work today. DOI: 9/18/17. Patient was performing defensive tactic drills when she hyperextended her left thumb, causing immediate pain. Patient describes a numbness in her left thumb and worsening pain with certain movements. She states it feels as though it is dislocated. She has taken Excedrin Migraine today and is on chronic pain medication. Patient denies any history of injury to this thumb in the past and has no other places of employment.   Objective Findings: Blood pressure 122/70, pulse 98, temperature 36.8 °C (98.3 °F), resp. rate 16, height 1.727 m (5' 8\"), weight 105.6 kg (232 lb 12.8 oz), SpO2 96 %.  General: Well developed, well nourished. No distress.  HEENT: Head is grossly normal.  Pulmonary: No respiratory distress noted.  Cardiovascular: Cap refill is less than 2 seconds in the left thumb.  Neurologic: No sensory deficit noted on exam.  Extremities: Trace edema and ecchymosis noted at the first MCP of the left hand. Full range of motion noted.  Skin: Warm, dry, good turgor. No rashes in visible areas.   Psych: Normal mood. Alert and oriented x3. Judgment and insight is normal.     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 Given this description, patient will not likely need any restrictions, however, given the location of her employment, patient should avoid any potential prisoner c  ontact until thumb is completely healed as fire arm handling may be affected by pain. See D39 for restrictions. RTC in one week for re-evaluation, sooner for any changes in symptoms. Continue chronic pain medication as directed.    Disability Information   Status: Released to Restricted Duty    From:  9/18/2017  Through: 9/25/2017 Restrictions are: Temporary   Physical Restrictions   Sitting:    Standing:    Stooping:    Bending:      Squatting:    Walking:    Climbing:    Pushing:      Pulling:    Other:    Reaching Above Shoulder (L):   Reaching Above Shoulder (R):       Reaching Below Shoulder (L):    Reaching Below Shoulder (R):      Not to exceed Weight Limits   Carrying(hrs):   Weight Limit(lb):   Lifting(hrs):   Weight  Limit(lb):     Comments: Given patient's description of her day today job duties, patient will not likely need any restrictions. Given the location of her employment, however, patient should avoid any potential prisoner contact until thumb is completely healed.    Repetitive Actions   Hands: i.e. Fine Manipulations from Grasping:     Feet: i.e. Operating Foot Controls:     Driving / Operate Machinery:     Physician Name: Santi Connelly P.A.-C. Physician Signature:   SANTI CONNELLY P.A.-C. e-Signature:  , Medical Director   Clinic Name / Location: [address] [address] Clinic Phone Number: Dept: [phone]   Appointment Time: 2:25 Pm Visit Start Time: 2:44 PM   Check-In Time:  2:41 Pm Visit Discharge Time: 3:45 PM   Original-Treating Physician or  Chiropractor    Page 2-Insurer/TPA    Page 3-Employer    Page 4-Employee

## 2017-09-18 NOTE — LETTER
"EMPLOYEE’S CLAIM FOR COMPENSATION/ REPORT OF INITIAL TREATMENT  FORM C-4    EMPLOYEE’S CLAIM - PROVIDE ALL INFORMATION REQUESTED   First Name  Anette Last Name  Nelly Birthdate                    1960                Sex  female Claim Number   Home Address  [address] Age  57 y.o. Height  1.727 m (5' 8\") Weight  105.6 kg (232 lb 12.8 oz) [facility]     [place] Zip  [address] Telephone  [phone] (home)    Mailing Address  [address] [place] Zip  [address] Primary Language Spoken  English    Insurer  [employer]   [employer]   Employee's Occupation (Job Title) When Injury or Occupational Disease Occurred      Employer's Name  [employer]  Telephone  [phone]    Employer Address  [address]  [place] [facility]  Zip  [address]    Date of Injury  9/18/2017               Hour of Injury  11:30 AM Date Employer Notified  9/18/2017 Last Day of Work after Injury or Occupational Disease  9/18/2017 Supervisor to Whom Injury Reported  Sandeep Pepper   Address or Location of Accident (if applicable)  [[facility] [address]]   What were you doing at the time of accident? (if applicable)  Defensive Tactics Training, Arm Bar Take Down     How did this injury or occupational disease occur? (Be specific an answer in detail. Use additional sheet if necessary)  During defensive tactics team arm bar take down tactic my left thumb was hurt, dislocated or strained pain and numbness.   If you believe that you have an occupational disease, when did you first have knowledge of the disability and it relationship to your employment?  n/a Witnesses to the Accident  [place]       Nature of Injury or Occupational Disease  Workers' Compensation  Part(s) of Body Injured or Affected  Thumb (L), ,     I " certify that the above is true and correct to the best of my knowledge and that I have provided this information in order to obtain the benefits of [place]  I hereby authorize any physician, chiropractor, surgeon, practitioner, or other person, any hospital, including [facility], any medical service organization, any insurance company, or other institution or organization to release to each other, any medical or other information, including benefits paid or payable, pertinent to this injury or disease, except information relative to diagnosis, treatment and/or counseling for AIDS, psychological conditions, alcohol or controlled substances, for which I must give specific authorization.  A Photostat of this authorization shall be as valid as the original.     Date   Place   Employee’s Signature   THIS REPORT MUST BE COMPLETED AND MAILED WITHIN 3 WORKING DAYS OF TREATMENT   Place  [facility] [place]  Name of Facility  [place]   Date  9/18/2017 Diagnosis  (S69.92XA) Injury of left thumb, initial encounter Is there evidence the injured employee was under the influence of alcohol and/or another controlled substance at the time of accident?   Hour  2:44 PM Description of Injury or Disease  The encounter diagnosis was Injury of left thumb, initial encounter. 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 Given this description, patient will not likely need any restrictions, however, given the location of her employment, patient should avoid any potential prisoner contact until thumb is completely healed. See D39 for restrictions. RTC in one week for re-evaluation, sooner for any changes in symptoms. Continue chronic pain medication as directed.  Have you advised the patient to remain off work five days or more? No   X-Ray  "Findings  Negative   If Yes   From Date  To Date      From information given by the employee, together with medical evidence, can you directly connect this injury or occupational disease as job incurred?  Yes If No Full Duty  No Modified Duty  Yes   Is additional medical care by a physician indicated?  Yes If Modified Duty, Specify any Limitations / Restrictions  See D39   Do you know of any previous injury or disease contributing to this condition or occupational disease?                            No   Date  9/18/2017 Print Doctor’s Name Santi Connelly P.A.-C. I certify the employer’s copy of  this form was mailed on:   Address  [address] Insurer’s Use Only     [place]  [address]    Provider’s Tax ID Number  [id number]  Telephone  Dept: [phone]          SANTI CONNELLY P.A.-C.   e-Signature:  , Medical Director Degree  PAC        ORIGINAL-TREATING PHYSICIAN OR CHIROPRACTOR    PAGE 2-INSURER/TPA    PAGE 3-EMPLOYER    PAGE 4-EMPLOYEE             Form C-4 (rev10/07)              BRIEF DESCRIPTION OF RIGHTS AND BENEFITS  (Pursuant to [place] 616C.050)    Notice of Injury or Occupational Disease (Incident Report Form C-1): If an injury or occupational disease (OD) arises out of and in the  course of employment, you must provide written notice to your employer as soon as practicable, but no later than 7 days after the accident or  OD. Your employer shall maintain a sufficient supply of the required forms.    Claim for Compensation (Form C-4): If medical treatment is sought, the form C-4 is available at the place of initial treatment. A completed  \"Claim for Compensation\" (Form C-4) must be filed within 90 days after an accident or OD. The treating physician or chiropractor must,  within 3 working days after treatment, complete and mail to the employer, the employer's insurer and third-party , the Claim for  Compensation.    Medical Treatment: If you require medical " treatment for your on-the-job injury or OD, you may be required to select a physician or  chiropractor from a list provided by your workers’ compensation insurer, if it has contracted with an Organization for Managed Care (MCO) or  Preferred Provider Organization (PPO) or providers of health care. If your employer has not entered into a contract with an MCO or PPO, you  may select a physician or chiropractor from the Panel of Physicians and Chiropractors. Any medical costs related to your industrial injury or  OD will be paid by your insurer.    Temporary Total Disability (TTD): If your doctor has certified that you are unable to work for a period of at least 5 consecutive days, or 5  cumulative days in a 20-day period, or places restrictions on you that your employer does not accommodate, you may be entitled to TTD  compensation.    Temporary Partial Disability (TPD): If the wage you receive upon reemployment is less than the compensation for TTD to which you are  entitled, the insurer may be required to pay you TPD compensation to make up the difference. TPD can only be paid for a maximum of 24  months.    Permanent Partial Disability (PPD): When your medical condition is stable and there is an indication of a PPD as a result of your injury or  OD, within 30 days, your insurer must arrange for an evaluation by a rating physician or chiropractor to determine the degree of your PPD. The  amount of your PPD award depends on the date of injury, the results of the PPD evaluation and your age and wage.    Permanent Total Disability (PTD): If you are medically certified by a treating physician or chiropractor as permanently and totally disabled  and have been granted a PTD status by your insurer, you are entitled to receive monthly benefits not to exceed 66 2/3% of your average  monthly wage. The amount of your PTD payments is subject to reduction if you previously received a PPD award.    Vocational Rehabilitation  Services: You may be eligible for vocational rehabilitation services if you are unable to return to the job due to a  permanent physical impairment or permanent restrictions as a result of your injury or occupational disease.    Transportation and Per Aidee Reimbursement: You may be eligible for travel expenses and per aidee associated with medical treatment.    Reopening: You may be able to reopen your claim if your condition worsens after claim closure.    Appeal Process: If you disagree with a written determination issued by the insurer or the insurer does not respond to your request, you may  appeal to the Department of Administration, , by following the instructions contained in your determination letter. You must  appeal the determination within 70 days from the date of the determination letter at [address], [address], [place], [place]  [address], or [address], [address], [place], [place] [address]. If you disagree with the  decision, you may appeal to the  Department of Administration, . You must file your appeal within 30 days from the date of the  decision  letter at [address], [address], [place], [place] [address], or [address], [address], [place], [place] [address]. If you  disagree with a decision of an , you may file a petition for judicial review with the District Court. You must do so within 30  days of the Appeal Officer’s decision. You may be represented by an  at your own expense or you may contact the [facility] for possible  representation.    [place]  for Injured Workers (NAIW): If you disagree with a  decision, you may request that NAIW represent you  without charge at an  Hearing. For information regarding denial of benefits, you may contact the [facility] at: [address], [address], [place] [address], [phone], or [address], [address], [place] [address], [phone]    To File a Complaint with the Division: If you wish to file a complaint with the  of the Division of Industrial Relations (DIR),  please contact the Workers’ Compensation Section, [address], [address], [place], [place] [address], telephone [phone], or  [address], [address], [place], [place] [address], telephone [phone].    For assistance with Workers’ Compensation Issues: you may contact the Office of the Governor Consumer Health Assistance, [address] [place] [address], [address], [place], [place] [address], Toll Free 1-122.558.9323, Web site: http://govcha.[facility].nv.us, E-mail  Bhavani@[facility].nv.                                                                                                                                                                                                                                   __________________________________________________________________                                                                   _________________                Employee Name / Signature                                                                                                                                                       Date                                                                                                                                                                                                     D-2 (rev. 10/07)

## 2017-09-19 ENCOUNTER — OCCUPATIONAL MEDICINE (OUTPATIENT)
Dept: URGENT CARE | Facility: PHYSICIAN GROUP | Age: 57
End: 2017-09-19
Payer: COMMERCIAL

## 2017-09-19 VITALS
DIASTOLIC BLOOD PRESSURE: 80 MMHG | HEART RATE: 86 BPM | WEIGHT: 231 LBS | HEIGHT: 68 IN | TEMPERATURE: 97.9 F | RESPIRATION RATE: 16 BRPM | SYSTOLIC BLOOD PRESSURE: 130 MMHG | OXYGEN SATURATION: 98 % | BODY MASS INDEX: 35.01 KG/M2

## 2017-09-19 DIAGNOSIS — S69.92XD INJURY OF LEFT THUMB, SUBSEQUENT ENCOUNTER: ICD-10-CM

## 2017-09-19 PROCEDURE — 99213 OFFICE O/P EST LOW 20 MIN: CPT | Mod: 29 | Performed by: PHYSICIAN ASSISTANT

## 2017-09-19 ASSESSMENT — PAIN SCALES - GENERAL: PAINLEVEL: NO PAIN

## 2017-09-19 NOTE — LETTER
"   [facility] [address] [place] [address]  Phone: [phone] - Fax: [phone]        [facility] Progress Report and Disability Certification  Date of Service: 9/19/2017   No Show:  No  Date / Time of Next Visit: 9/27/2017   Claim Information   Patient Name: Anette Villegas  Claim Number:     Employer: [employer] Date of Injury: 9/18/2017     Insurer / TPA: [employer]  ID / SSN:     Occupation:   Diagnosis: The encounter diagnosis was Injury of left thumb, subsequent encounter.    Medical Information   Related to Industrial Injury? Yes    Subjective Complaints:  Patient comes in for Worker's Compensation follow-up. DOI: 9/18/17. Patient was performing defensive tactic drills when she hyperextended her left thumb, causing immediate pain. Patient was seen yesterday for the same. She states it is feeling better but does continue to have some soreness. She feels as though she is able to work without restrictions. Patient has not been taking any over-the-counter medication but does take chronic pain medication. Patient denies any history of injury to this thumb in the past and has no other places of employment.    Objective Findings: Blood pressure 130/80, pulse 86, temperature 36.6 °C (97.9 °F), resp. rate 16, height 1.727 m (5' 8\"), weight 104.8 kg (231 lb), SpO2 98 %.  General: Well developed, well nourished. No distress.  HEENT: Head is grossly normal.  Pulmonary: No respiratory distress noted.  Cardiovascular: Cap refill is less than 2 seconds on the left thumb.  Neurologic: No sensory deficit noted.  Extremities: Ecchymosis noted over the hyperthenar aspect of the left hand. Patient does have strong resisted range of motion of left thumb.  Psych: Normal mood. Alert and oriented x3. Judgment and insight is normal.   Pre-Existing Condition(s):     Assessment:   Condition Improved    Status: " Additional Care Required  Permanent Disability:No    Plan: Medication  Comments:chronic pain medication only; no new meds    Diagnostics:      Comments:  PLAN: Return to work without restrictions. Follow-up in a week to 10 days for reevaluation, sooner for any changes in symptoms.     Disability Information   Status: Released to Full Duty    From:  9/19/2017  Through: 9/27/2017 Restrictions are:     Physical Restrictions   Sitting:    Standing:    Stooping:    Bending:      Squatting:    Walking:    Climbing:    Pushing:      Pulling:    Other:    Reaching Above Shoulder (L):   Reaching Above Shoulder (R):       Reaching Below Shoulder (L):    Reaching Below Shoulder (R):      Not to exceed Weight Limits   Carrying(hrs):   Weight Limit(lb):   Lifting(hrs):   Weight  Limit(lb):     Comments:      Repetitive Actions   Hands: i.e. Fine Manipulations from Grasping:     Feet: i.e. Operating Foot Controls:     Driving / Operate Machinery:     Physician Name: Santi Connelly P.A.-C. Physician Signature: SANTI Vergara P.A.-C. e-Signature:  , Medical Director   Clinic Name / Location: [address] [address] Clinic Phone Number: Dept: [phone]   Appointment Time: 2:35 Pm Visit Start Time: 2:44 PM   Check-In Time:  2:33 Pm Visit Discharge Time:  3:04 pm   Original-Treating Physician or Chiropractor    Page 2-Insurer/TPA    Page 3-Employer    Page 4-Employee

## 2017-09-19 NOTE — LETTER
"   [facility] [place] [place] [address]  Phone: [phone] - Fax: [phone]        [facility] Progress Report and Disability Certification  Date of Service: 9/19/2017   No Show:  No  Date / Time of Next Visit: 9/27/2017   Claim Information   Patient Name: Anette Villegas  Claim Number:     Employer: [employer] *** Date of Injury: 9/18/2017     Insurer / TPA: [employer] *** ID / SSN:     Occupation:  *** Diagnosis: The encounter diagnosis was Injury of left thumb, subsequent encounter.    Medical Information   Related to Industrial Injury? Yes ***   Subjective Complaints:  Patient comes in for Worker's Compensation follow-up. DOI: 9/18/17. Patient was performing defensive tactic drills when she hyperextended her left thumb, causing immediate pain. Patient was seen yesterday for the same. She states it is feeling better but does continue to have some soreness. She feels as though she is able to work without restrictions. Patient has not been taking any over-the-counter medication but does take chronic pain medication. Patient denies any history of injury to this thumb in the past and has no other places of employment.    Objective Findings: Blood pressure 130/80, pulse 86, temperature 36.6 °C (97.9 °F), resp. rate 16, height 1.727 m (5' 8\"), weight 104.8 kg (231 lb), SpO2 98 %.  General: Well developed, well nourished. No distress.  HEENT: Head is grossly normal.  Pulmonary: No respiratory distress noted.  Cardiovascular: Cap refill is less than 2 seconds on the left thumb.  Neurologic: No sensory deficit noted.  Extremities: Ecchymosis noted over the hyperthenar aspect of the left hand. Patient does have strong resisted range of motion of left thumb.  Psych: Normal mood. Alert and oriented x3. Judgment and insight is normal.   Pre-Existing Condition(s):     Assessment:   Condition Improved  "   Status: Additional Care Required  Permanent Disability:No    Plan: Medication  Comments:chronic pain medication only; no new meds    Diagnostics:      Comments:  PLAN: Return to work without restrictions. Follow-up in a week to 10 days for reevaluation, sooner for any changes in symptoms.     Disability Information   Status: Released to Full Duty    From:  9/19/2017  Through: 9/27/2017 Restrictions are:     Physical Restrictions   Sitting:    Standing:    Stooping:    Bending:      Squatting:    Walking:    Climbing:    Pushing:      Pulling:    Other:    Reaching Above Shoulder (L):   Reaching Above Shoulder (R):       Reaching Below Shoulder (L):    Reaching Below Shoulder (R):      Not to exceed Weight Limits   Carrying(hrs):   Weight Limit(lb):   Lifting(hrs):   Weight  Limit(lb):     Comments:      Repetitive Actions   Hands: i.e. Fine Manipulations from Grasping:     Feet: i.e. Operating Foot Controls:     Driving / Operate Machinery:     Physician Name: Santi Connelly P.A.-C. Physician Signature: SANTI Vergara P.A.-C. e-Signature:  , Medical Director   Clinic Name / Location: [address] [address] Clinic Phone Number: Dept: [phone]   Appointment Time: 2:35 Pm Visit Start Time: 2:44 PM   Check-In Time:  2:33 Pm Visit Discharge Time:  ***   Original-Treating Physician or Chiropractor    Page 2-Insurer/TPA    Page 3-Employer    Page 4-Employee

## 2017-09-19 NOTE — PROGRESS NOTES
Chief Complaint   Patient presents with   • Follow-Up     needs a work release       HISTORY OF PRESENT ILLNESS: Patient is a 57 y.o. female who presents today for the following:    Patient comes in for Worker's Compensation follow-up. DOI: 9/18/17. Patient was performing defensive tactic drills when she hyperextended her left thumb, causing immediate pain. Patient was seen yesterday for the same. She states it is feeling better but does continue to have some soreness. She feels as though she is able to work without restrictions. Patient has not been taking any over-the-counter medication but does take chronic pain medication. Patient denies any history of injury to this thumb in the past and has no other places of employment.     There are no active problems to display for this patient.      Allergies:Bee; Penicillins; Soap; Tape; and Tetracycline    Current Outpatient Prescriptions Ordered in [facility]   Medication Sig Dispense Refill   • fluticasone (FLOVENT HFA) 220 MCG/ACT Aerosol Inhale 1 Puff by mouth 2 times a day. 1 Inhaler 0   • azithromycin (ZITHROMAX) 250 MG Tab Take 2 tabs today, then 1 tab daily. 6 Tab 0   • MethylPREDNISolone (MEDROL DOSEPAK) 4 MG Tablet Therapy Pack UAD 1 Tab 0   • HYDROCODONE-ACETAMINOPHEN PO Take  by mouth.     • Cyclobenzaprine HCl (FLEXERIL PO) Take  by mouth.     • montelukast (SINGULAIR) 10 MG Tab Take 10 mg by mouth every day.     • fluticasone (FLONASE) 50 MCG/ACT nasal spray Spray 1 Spray in nose every day.     • Albuterol (VENTOLIN INH) Inhale  by mouth.     • niacin SR (NIASPAN) 500 MG Tab CR Take 500 mg by mouth every evening.     • nystatin (MYCOSTATIN) 492786 UNIT/ML Suspension Take 500,000 Units by mouth 4 times a day.     • ofloxacin (OCUFLOX) 0.3 % Solution Place 1 Drop in both eyes 4 times a day.     • doxepin (SINEQUAN) 25 MG Cap Take 25 mg by mouth every evening.     • terbinafine (LAMISIL) 250 MG Tab Take 250 mg by mouth every day.     • ciprofloxacin (CIPRO) 500 MG  Tab Take 1 Tab by mouth 2 times a day. 20 Tab 0   • metronidazole (FLAGYL) 500 MG Tab Take 1 Tab by mouth 3 times a day. 30 Tab 0   • ondansetron (ZOFRAN ODT) 4 MG TABLET DISPERSIBLE Take 1 Tab by mouth every 8 hours as needed for Nausea/Vomiting. 20 Tab 0   • albuterol (PROVENTIL) 2.5mg/3ml Nebu Soln solution for nebulization 3 mL by Nebulization route every four hours as needed for Shortness of Breath. 75 mL 0   • predniSONE (DELTASONE) 10 MG Tab 2 tabs BID x 2 days, 3 tabs daily x 2 days, 2 tabs daily x 2 days, 1 tab daily x 2 days 20 Tab 0   • ondansetron (ZOFRAN ODT) 8 MG TABLET DISPERSIBLE Take 1 Tab by mouth every 8 hours as needed for Nausea/Vomiting. 15 Tab 0   • albuterol (PROVENTIL) 2.5mg/3ml Nebu Soln solution for nebulization 3 mL by Nebulization route every four hours as needed for Shortness of Breath. 75 mL 0   • dicyclomine (BENTYL) 20 MG TABS Take 1 Tab by mouth 4 times a day as needed. 20 Tab 0   • omeprazole (PRILOSEC) 20 MG CPDR Take 40 mg by mouth every day.     • METHIMAZOLE 5 MG PO TABS Take 0.5 Tabs by mouth every day.     • BENAZEPRIL HCL 20 MG PO TABS Take 1 Tab by mouth every day.     • LOVASTATIN 40 MG PO TABS Take 1 Tab by mouth every day.     • CYCLOBENZAPRINE HCL 10 MG PO TABS Take 1 Tab by mouth 2 times a day.     • HYDROCODONE-ACETAMINOPHEN  MG PO TABS Take 1 Tab by mouth every 6 hours as needed for Mild Pain.       No current Epic-ordered facility-administered medications on file.        Past Medical History:   Diagnosis Date   • Personal history of venous thrombosis and embolism 6/23/2009    right leg   • Arthritis    • Chronic airway obstruction, not elsewhere classified    • Dental disorder     dentures   • Grave's disease    • Hypertension    • Pain     lower back       Social History   Substance Use Topics   • Smoking status: Current Every Day Smoker     Packs/day: 1.00     Types: Cigarettes   • Smokeless tobacco: Never Used   • Alcohol use No       Family Status  "  Relation Status   • Maternal Grandmother    • Maternal Aunt      Family History   Problem Relation Age of Onset   • Cancer Maternal Grandmother    • Cancer Maternal Aunt        ROS:    Review of Systems   Constitutional: Negative for fever, chills, weight loss and malaise/fatigue.     Exam:  Blood pressure 130/80, pulse 86, temperature 36.6 °C (97.9 °F), resp. rate 16, height 1.727 m (5' 8\"), weight 104.8 kg (231 lb), SpO2 98 %.  General: Well developed, well nourished. No distress.  HEENT: Head is grossly normal.  Pulmonary: No respiratory distress noted.  Cardiovascular: Cap refill is less than 2 seconds on the left thumb.  Neurologic: No sensory deficit noted.  Extremities: Ecchymosis noted over the hyperthenar aspect of the left hand. Patient does have strong resisted range of motion of left thumb.  Psych: Normal mood. Alert and oriented x3. Judgment and insight is normal.    Assessment/Plan:  Return to work without restrictions. Follow-up in a week to 10 days for reevaluation, sooner for any changes in symptoms.   1. Injury of left thumb, subsequent encounter         "

## 2017-09-27 ENCOUNTER — OCCUPATIONAL MEDICINE (OUTPATIENT)
Dept: URGENT CARE | Facility: PHYSICIAN GROUP | Age: 57
End: 2017-09-27
Payer: COMMERCIAL

## 2017-09-27 VITALS
BODY MASS INDEX: 34.86 KG/M2 | HEIGHT: 68 IN | HEART RATE: 98 BPM | RESPIRATION RATE: 16 BRPM | OXYGEN SATURATION: 95 % | WEIGHT: 230 LBS | TEMPERATURE: 98.7 F

## 2017-09-27 DIAGNOSIS — S69.92XD INJURY OF LEFT THUMB, SUBSEQUENT ENCOUNTER: ICD-10-CM

## 2017-09-27 DIAGNOSIS — S63.602D SPRAIN OF LEFT THUMB, UNSPECIFIED SITE OF FINGER, SUBSEQUENT ENCOUNTER: ICD-10-CM

## 2017-09-27 PROCEDURE — 99214 OFFICE O/P EST MOD 30 MIN: CPT | Performed by: FAMILY MEDICINE

## 2017-09-27 RX ORDER — METHYLPREDNISOLONE 4 MG/1
TABLET ORAL
Qty: 21 TAB | Refills: 0 | Status: SHIPPED | OUTPATIENT
Start: 2017-09-27 | End: 2018-11-17

## 2017-09-27 RX ORDER — CELECOXIB 200 MG/1
200 CAPSULE ORAL DAILY
COMMUNITY
End: 2020-05-06

## 2017-09-27 NOTE — LETTER
[address] [place] [address]  Phone:  [phone] - Fax:  [phone]   [facility] Progress Report and Disability Certification  Date of Service: 9/27/2017   No Show:  No  Date / Time of Next Visit:     Claim Information   Patient Name: Anette Villegas  Claim Number:     Employer: [employer]  Date of Injury: 9/18/2017     Insurer / TPA: [employer]  ID / SSN:     Occupation:   Diagnosis: The encounter diagnosis was Injury of left thumb, subsequent encounter.    Medical Information   Related to Industrial Injury? Yes    Subjective Complaints:  DOI: 9/18/17. Left thumb still hurts most in 1st MCP joint region with certain physical maneuvers. Doing full duty. Already takes Celebrex 200 mg at night. Has Hydrocodone-APAP  mg to take. Overall feels same compared to OV 9/19/17.   Objective Findings: Pain in left 1st MCP joint with resisted flexion and extension at joints in left thumb but is able to fully flex and extend against resistance.   Pre-Existing Condition(s):     Assessment:   Condition Same    Status: Additional Care Required  Comments:Return on 10/6/17 or sooner if needed.  Permanent Disability:No    Plan: Medication  Comments:Medrol Dose prescribed.    Diagnostics:      Comments:  Since overall condition is same compared to OV 9/19/17 and already has Celebrex 200 mg and Hydrocodone-APAP  mg, I discussed short-term steroid treatment for anti-inflammatory effect. She is agreeable to Medrol Dose Noam prescribed.    Disability Information   Status: Released to Full Duty    From:  9/27/2017  Through:   Restrictions are:     Physical Restrictions   Sitting:    Standing:    Stooping:    Bending:      Squatting:    Walking:    Climbing:    Pushing:      Pulling:    Other:    Reaching Above Shoulder (L):   Reaching Above Shoulder (R):       Reaching Below Shoulder (L):   Reaching Below Shoulder (R):      Not to exceed Weight Limits   Carrying(hrs):   Weight Limit(lb):   Lifting(hrs):   Weight  Limit(lb):     Comments:      Repetitive Actions   Hands: i.e. Fine Manipulations from Grasping:     Feet: i.e. Operating Foot Controls:     Driving / Operate Machinery:     Physician Name: Lenny Baires M.D. Physician Signature: LENNY Camacho M.D. e-Signature: Dr. Per Adkins, Medical Director   Clinic Name / Location: [address] [address] Clinic Phone Number: Dept: [phone]   Appointment Time: 9:00 Am Visit Start Time: 8:54 AM   Check-In Time:  8:39 Am Visit Discharge Time:  918am   Original-Treating Physician or Chiropractor    Page 2-Insurer/TPA    Page 3-Employer    Page 4-Employee

## 2017-09-27 NOTE — PROGRESS NOTES
Chief Complaint:    Chief Complaint   Patient presents with   • Follow-Up     [facility], thumb injury, Hyperextension       History of Present Illness:    DOI: 9/18/17. Left thumb still hurts most in 1st MCP joint region with certain physical maneuvers. Doing full duty. Already takes Celebrex 200 mg at night. Has Hydrocodone-APAP  mg to take. Overall feels same compared to OV 9/19/17.      Review of Systems:    Constitutional: Negative for fever, chills, and diaphoresis.   Eyes: Negative for change in vision, photophobia, pain, redness, and discharge.  ENT: Negative for ear pain, ear discharge, hearing loss, tinnitus, nasal congestion, nosebleeds, and sore throat.    Respiratory: Negative for cough, hemoptysis, sputum production, shortness of breath, wheezing, and stridor.    Cardiovascular: Negative for chest pain, palpitations, orthopnea, claudication, leg swelling, and PND.   Gastrointestinal: Negative for abdominal pain, nausea, vomiting, diarrhea, constipation, blood in stool, and melena.   Genitourinary: Negative for dysuria, urinary urgency, urinary frequency, hematuria, and flank pain.   Musculoskeletal: See HPI.   Skin: Negative for rash and itching.   Neurological: Negative for dizziness, tingling, tremors, sensory change, speech change, focal weakness, seizures, loss of consciousness, and headaches.   Endo: Negative for polydipsia.   Heme: Does not bruise/bleed easily.   Psychiatric/Behavioral: Negative for depression, suicidal ideas, hallucinations, memory loss and substance abuse. The patient is not nervous/anxious and does not have insomnia.      Past Medical History:    Past Medical History:   Diagnosis Date   • Personal history of venous thrombosis and embolism 6/23/2009    right leg   • Arthritis    • Chronic airway obstruction, not elsewhere classified    • Dental disorder     dentures   • Grave's disease    • Hypertension    • Pain     lower back       Past Surgical History:    Past Surgical  History:   Procedure Laterality Date   • ACL RECONSTRUCTION SCOPE  7/9/2009    Performed by RENEE LEVI at SUR[facility]ERY [facility]   • LUMBAR FUSION POSTERIOR  2004   • PB DISKECTOMY,PERCUTANEOUS LUMBAR  2001   • HYSTERECTOMY, VAGINAL  1999   • TUBAL LIGATION  1994   • TONSILLECTOMY  1968       Social History:    Social History     Social History   • Marital status:      Spouse name: N/A   • Number of children: N/A   • Years of education: N/A     Social History Main Topics   • Smoking status: Current Every Day Smoker     Packs/day: 1.00     Types: Cigarettes   • Smokeless tobacco: Never Used   • Alcohol use No   • Drug use: No   • Sexual activity: Not on file     Other Topics Concern   • Not on file     Social History Narrative   • No narrative on file       Family History:    Family History   Problem Relation Age of Onset   • Cancer Maternal Grandmother    • Cancer Maternal Aunt        Medications:    Current Outpatient Prescriptions on File Prior to Visit   Medication Sig Dispense Refill   • fluticasone (FLOVENT HFA) 220 MCG/ACT Aerosol Inhale 1 Puff by mouth 2 times a day. 1 Inhaler 0   • nystatin (MYCOSTATIN) 618862 UNIT/ML Suspension Take 500,000 Units by mouth 4 times a day.     • ofloxacin (OCUFLOX) 0.3 % Solution Place 1 Drop in both eyes 4 times a day.     • doxepin (SINEQUAN) 25 MG Cap Take 25 mg by mouth every evening.     • albuterol (PROVENTIL) 2.5mg/3ml Nebu Soln solution for nebulization 3 mL by Nebulization route every four hours as needed for Shortness of Breath. 75 mL 0   • METHIMAZOLE 5 MG PO TABS Take 0.5 Tabs by mouth every day.     • BENAZEPRIL HCL 20 MG PO TABS Take 1 Tab by mouth every day.     • LOVASTATIN 40 MG PO TABS Take 1 Tab by mouth every day.     • CYCLOBENZAPRINE HCL 10 MG PO TABS Take 1 Tab by mouth 2 times a day.     • HYDROCODONE-ACETAMINOPHEN  MG PO TABS Take 1 Tab by mouth every 6 hours as needed for Mild Pain.     • fluticasone (FLONASE) 50 MCG/ACT nasal  "spray Spray 1 Spray in nose every day.     • Albuterol (VENTOLIN INH) Inhale  by mouth.       No current facility-administered medications on file prior to visit.        Allergies:    Allergies   Allergen Reactions   • Bee    • Penicillins    • Soap    • Tape    • Tetracycline          Vitals:    Vitals:    09/27/17 0856   Pulse: 98   Resp: 16   Temp: 37.1 °C (98.7 °F)   SpO2: 95%   Weight: 104.3 kg (230 lb)   Height: 1.727 m (5' 8\")       Physical Exam:    Constitutional: Vital signs reviewed. Appears well-developed and well-nourished. No acute distress.   Eyes: Sclera white, conjunctivae clear.  ENT: External ears normal. Hearing normal.  Cardiovascular: Peripheral pulses 2+.   Pulmonary/Chest: Respirations non-labored.  Musculoskeletal: Pain in left 1st MCP joint with resisted flexion and extension at joints in left thumb but is able to fully flex and extend against resistance.   Neurological: Alert and oriented to person, place, and time. Muscle tone normal. Coordination normal. Light touch and sensation normal.  Skin: No rashes or lesions. Warm, dry, normal turgor.  Psychiatric: Normal mood and affect. Behavior is normal. Judgment and thought content normal.       Assessment / Plan:    1. Injury of left thumb, subsequent encounter  - MethylPREDNISolone (MEDROL DOSEPAK) 4 MG Tablet Therapy Pack; TAKE AS DIRECTED ON PACKAGE. WORK COMP.  Dispense: 21 Tab; Refill: 0    2. Sprain of left thumb, unspecified site of finger, subsequent encounter  - MethylPREDNISolone (MEDROL DOSEPAK) 4 MG Tablet Therapy Pack; TAKE AS DIRECTED ON PACKAGE. WORK COMP.  Dispense: 21 Tab; Refill: 0      Full duty.    Since overall condition is same compared to OV 9/19/17 and already has Celebrex 200 mg and Hydrocodone-APAP  mg, I discussed short-term steroid treatment for anti-inflammatory effect. She is agreeable to Medrol Dose Noam prescribed.    Return on 10/6/17 or sooner if needed.  "

## 2017-10-06 ENCOUNTER — OCCUPATIONAL MEDICINE (OUTPATIENT)
Dept: URGENT CARE | Facility: PHYSICIAN GROUP | Age: 57
End: 2017-10-06
Payer: COMMERCIAL

## 2017-10-06 VITALS
DIASTOLIC BLOOD PRESSURE: 90 MMHG | RESPIRATION RATE: 16 BRPM | HEIGHT: 68 IN | SYSTOLIC BLOOD PRESSURE: 120 MMHG | BODY MASS INDEX: 35.31 KG/M2 | HEART RATE: 94 BPM | WEIGHT: 233 LBS | TEMPERATURE: 98.2 F | OXYGEN SATURATION: 97 %

## 2017-10-06 DIAGNOSIS — S63.602A SPRAIN OF LEFT THUMB, UNSPECIFIED SITE OF FINGER, INITIAL ENCOUNTER: ICD-10-CM

## 2017-10-06 DIAGNOSIS — I10 ESSENTIAL HYPERTENSION: ICD-10-CM

## 2017-10-06 PROCEDURE — 99214 OFFICE O/P EST MOD 30 MIN: CPT | Performed by: FAMILY MEDICINE

## 2017-10-06 NOTE — PROGRESS NOTES
Subjective:      Anette Villegas is a 57 y.o. female who presents with Follow-Up ([facility], Thumb injury, Pt states she is improving)      DOI: 9/18/17. Patient comes for f/u of left thumb sprain injury sustained when she was performing defensive tactic drills when she hyperextended her left thumb, causing immediate pain.this is her fourth visit in the urgent care she has been tolerating full duty for at least 2 weeks. She is on chronic pain medication for her back pain. She does feel that her thumb pain has greatly improved there is some mild stiffness still but no weakness no numbness or tingling.     HPI  ROS  PMH:  has a past medical history of Arthritis; Chronic airway obstruction, not elsewhere classified; Dental disorder; Grave's disease; Hypertension; Pain; and Personal history of venous thrombosis and embolism (6/23/2009).  MEDS:   Current Outpatient Prescriptions:   •  fluticasone (FLOVENT HFA) 220 MCG/ACT Aerosol, Inhale 1 Puff by mouth 2 times a day., Disp: 1 Inhaler, Rfl: 0  •  fluticasone (FLONASE) 50 MCG/ACT nasal spray, Spray 1 Spray in nose every day., Disp: , Rfl:   •  nystatin (MYCOSTATIN) 575157 UNIT/ML Suspension, Take 500,000 Units by mouth 4 times a day., Disp: , Rfl:   •  ofloxacin (OCUFLOX) 0.3 % Solution, Place 1 Drop in both eyes 4 times a day., Disp: , Rfl:   •  doxepin (SINEQUAN) 25 MG Cap, Take 25 mg by mouth every evening., Disp: , Rfl:   •  albuterol (PROVENTIL) 2.5mg/3ml Nebu Soln solution for nebulization, 3 mL by Nebulization route every four hours as needed for Shortness of Breath., Disp: 75 mL, Rfl: 0  •  METHIMAZOLE 5 MG PO TABS, Take 0.5 Tabs by mouth every day., Disp: , Rfl:   •  BENAZEPRIL HCL 20 MG PO TABS, Take 1 Tab by mouth every day., Disp: , Rfl:   •  LOVASTATIN 40 MG PO TABS, Take 1 Tab by mouth every day., Disp: , Rfl:   •  CYCLOBENZAPRINE HCL 10 MG PO TABS, Take 1 Tab by mouth 2 times a day., Disp: , Rfl:   •  HYDROCODONE-ACETAMINOPHEN  MG PO TABS, Take 1 Tab  "by mouth every 6 hours as needed for Mild Pain., Disp: , Rfl:   •  celecoxib (CELEBREX) 200 MG Cap, Take 200 mg by mouth 2 times a day., Disp: , Rfl:   •  MethylPREDNISolone (MEDROL DOSEPAK) 4 MG Tablet Therapy Pack, TAKE AS DIRECTED ON PACKAGE. WORK COMP., Disp: 21 Tab, Rfl: 0  •  Albuterol (VENTOLIN INH), Inhale  by mouth., Disp: , Rfl:   ALLERGIES:   Allergies   Allergen Reactions   • Bee    • Penicillins    • Soap    • Tape    • Tetracycline    SURGHX:   Past Surgical History:   Procedure Laterality Date   • ACL RECONSTRUCTION SCOPE  7/9/2009    Performed by RENEE LEVI at SURGERY [facility] ORS   • LUMBAR FUSION POSTERIOR  2004   • PB DISKECTOMY,PERCUTANEOUS LUMBAR  2001   • HYSTERECTOMY, VAGINAL  1999   • TUBAL LIGATION  1994   • TONSILLECTOMY  1968   SOCHX:  reports that she has been smoking Cigarettes.  She has been smoking about 1.00 pack per day. She has never used smokeless tobacco. She reports that she does not drink alcohol or use drugs.  FH: Family history was reviewed, no pertinent findings to report     Objective:     /90   Pulse 94   Temp 36.8 °C (98.2 °F)   Resp 16   Ht 1.727 m (5' 8\")   Wt 105.7 kg (233 lb)   SpO2 97%   BMI 35.43 kg/m²      Physical Exam   Constitutional: She is oriented to person, place, and time. She appears well-developed and well-nourished. No distress.   HENT:   Mouth/Throat: Oropharynx is clear and moist.   Eyes: Conjunctivae are normal.   Cardiovascular: Normal rate.    Musculoskeletal: Normal range of motion. She exhibits edema. She exhibits no tenderness or deformity.   Neurological: She is alert and oriented to person, place, and time.   Skin: Skin is warm and dry. No rash noted. She is not diaphoretic. No erythema. No pallor.   Psychiatric: She has a normal mood and affect. Her behavior is normal.          Assessment/Plan:     1. Sprain of left thumb, unspecified site of finger, initial encounter       MMI  Patient brought in her own form to be " completed. It should be scanned to the chart     2. Essential hypertension stable on medication cont as directed

## 2017-10-06 NOTE — LETTER
[facility] [place] [address]  Phone:  [phone] - Fax:  [phone]   [facility] Progress Report and Disability Certification  Date of Service: 10/6/2017   No Show:  No  Date / Time of Next Visit:  MMI   Claim Information   Patient Name: Anette Villegas  Claim Number:     Employer: [employer]  Date of Injury: 9/18/2017     Insurer / TPA: [employer]  ID / SSN:     Occupation:   Diagnosis: The encounter diagnosis was Sprain of left thumb, unspecified site of finger, initial encounter.    Medical Information   Related to Industrial Injury? Yes    Subjective Complaints:  DOI: 9/18/17. Patient comes for f/u of left thumb sprain injury sustained when she was performing defensive tactic drills when she hyperextended her left thumb, causing immediate pain.this is her fourth visit in the urgent care she has been tolerating full duty for at least 2 weeks. She is on chronic pain medication for her back pain. She does feel that her thumb pain has greatly improved there is some mild stiffness still but no weakness no numbness or tingling.   Objective Findings: Physical Exam   Constitutional: She is oriented to person, place, and time. She appears well-developed and well-nourished. No distress.   HENT:   Mouth/Throat: Oropharynx is clear and moist.   Eyes: Conjunctivae are normal.   Cardiovascular: Normal rate.    Musculoskeletal: Normal range of motion. She exhibits edema. She exhibits no tenderness or deformity.   Neurological: She is alert and oriented to person, place, and time.   Skin: Skin is warm and dry. No rash noted. She is not diaphoretic. No erythema. No pallor.   Psychiatric: She has a normal mood and affect. Her behavior is normal.    Pre-Existing Condition(s):     Assessment:   Condition Improved    Status: Discharged /  MMI  Permanent Disability:No    Plan:      Diagnostics:        Comments:       Disability Information   Status: Released to Full Duty    From:     Through:   Restrictions are:     Physical Restrictions   Sitting:    Standing:    Stooping:    Bending:      Squatting:    Walking:    Climbing:    Pushing:      Pulling:    Other:    Reaching Above Shoulder (L):   Reaching Above Shoulder (R):       Reaching Below Shoulder (L):    Reaching Below Shoulder (R):      Not to exceed Weight Limits   Carrying(hrs):   Weight Limit(lb):   Lifting(hrs):   Weight  Limit(lb):     Comments:      Repetitive Actions   Hands: i.e. Fine Manipulations from Grasping:     Feet: i.e. Operating Foot Controls:     Driving / Operate Machinery:     Physician Name: Annie Fitzpatrick D.O. Physician Signature: ANNIE Milton D.O. e-Signature: Dr. Per Adkins, Medical Director   Clinic Name / Location: [address] [address] Clinic Phone Number: Dept: [phone]   Appointment Time: 10:00 Am Visit Start Time: 10:13 AM   Check-In Time:  10:04 Am Visit Discharge Time:  10:45am   Original-Treating Physician or Chiropractor    Page 2-Insurer/TPA    Page 3-Employer    Page 4-Employee

## 2017-10-13 ENCOUNTER — OFFICE VISIT (OUTPATIENT)
Dept: URGENT CARE | Facility: PHYSICIAN GROUP | Age: 57
End: 2017-10-13
Payer: COMMERCIAL

## 2017-10-13 VITALS
HEIGHT: 68 IN | HEART RATE: 104 BPM | TEMPERATURE: 98.8 F | WEIGHT: 229.2 LBS | DIASTOLIC BLOOD PRESSURE: 84 MMHG | SYSTOLIC BLOOD PRESSURE: 110 MMHG | OXYGEN SATURATION: 98 % | RESPIRATION RATE: 16 BRPM | BODY MASS INDEX: 34.74 KG/M2

## 2017-10-13 DIAGNOSIS — G43.809 OTHER MIGRAINE WITHOUT STATUS MIGRAINOSUS, NOT INTRACTABLE: ICD-10-CM

## 2017-10-13 DIAGNOSIS — R11.0 NAUSEA: ICD-10-CM

## 2017-10-13 DIAGNOSIS — J01.00 ACUTE NON-RECURRENT MAXILLARY SINUSITIS: ICD-10-CM

## 2017-10-13 PROCEDURE — 99214 OFFICE O/P EST MOD 30 MIN: CPT | Performed by: PHYSICIAN ASSISTANT

## 2017-10-13 RX ORDER — ONDANSETRON 4 MG/1
4 TABLET, ORALLY DISINTEGRATING ORAL ONCE
Status: COMPLETED | OUTPATIENT
Start: 2017-10-13 | End: 2017-10-13

## 2017-10-13 RX ORDER — SUMATRIPTAN 6 MG/.5ML
6 INJECTION, SOLUTION SUBCUTANEOUS ONCE
Status: COMPLETED | OUTPATIENT
Start: 2017-10-13 | End: 2017-10-13

## 2017-10-13 RX ORDER — AZITHROMYCIN 250 MG/1
TABLET, FILM COATED ORAL
Qty: 6 TAB | Refills: 0 | Status: SHIPPED | OUTPATIENT
Start: 2017-10-13 | End: 2018-11-17

## 2017-10-13 RX ADMIN — ONDANSETRON 4 MG: 4 TABLET, ORALLY DISINTEGRATING ORAL at 16:33

## 2017-10-13 RX ADMIN — SUMATRIPTAN 6 MG: 6 INJECTION, SOLUTION SUBCUTANEOUS at 16:33

## 2017-10-13 ASSESSMENT — ENCOUNTER SYMPTOMS
SORE THROAT: 0
HOARSE VOICE: 0
DIAPHORESIS: 0
CARDIOVASCULAR NEGATIVE: 1
NECK PAIN: 0
SENSORY CHANGE: 0
CHILLS: 0
HEADACHES: 1
PHOTOPHOBIA: 0
BLURRED VISION: 0
COUGH: 0
FOCAL WEAKNESS: 0
VOMITING: 0
DIZZINESS: 1
SINUS PRESSURE: 1
DOUBLE VISION: 0
NAUSEA: 1
SPEECH CHANGE: 0
SHORTNESS OF BREATH: 0
MUSCULOSKELETAL NEGATIVE: 1

## 2017-10-13 NOTE — PROGRESS NOTES
"Subjective:      Anette Villegas is a 57 y.o. female who presents with Sinusitis (cough, head pressure-headache, nausea, green mucus-drainage)            Upper respiratory symptoms x 1 week, worse today with headache, sinus pressure/pain, nausea.  Patient has been ill with nasal congestion for a week, vivian-seltzer cold with relief.  Today headache and sinus pain developed, associated elevated BP which resolved after taking her regular medications as prescribed for BP.  Headache is constant over sinus area.  History of migraines but typically onset and location is posterior with migraines.  No medication today prior to arrival.        Sinusitis   This is a new problem. Episode onset: 1 week ago, worse today. The problem has been gradually worsening since onset. There has been no fever. The pain is moderate. Associated symptoms include congestion, headaches and sinus pressure. Pertinent negatives include no chills, coughing, diaphoresis, ear pain, hoarse voice, neck pain, shortness of breath or sore throat. Treatments tried: vivian seltzer cold. The treatment provided mild (temporary) relief.       Review of Systems   Constitutional: Negative for chills and diaphoresis.   HENT: Positive for congestion and sinus pressure. Negative for ear pain, hoarse voice and sore throat.    Eyes: Negative for blurred vision, double vision and photophobia.        No change from baseline per patient   Respiratory: Negative for cough and shortness of breath.    Cardiovascular: Negative.    Gastrointestinal: Positive for nausea. Negative for vomiting.   Musculoskeletal: Negative.  Negative for neck pain.   Neurological: Positive for dizziness and headaches. Negative for sensory change, speech change and focal weakness.          Objective:     /84   Pulse (!) 104   Temp 37.1 °C (98.8 °F)   Resp 16   Ht 1.727 m (5' 8\")   Wt 104 kg (229 lb 3.2 oz)   SpO2 98%   BMI 34.85 kg/m²      Physical Exam   Constitutional: She is " oriented to person, place, and time. She appears well-developed and well-nourished.   HENT:   Head: Normocephalic and atraumatic.   Right Ear: External ear normal.   Left Ear: External ear normal.   Mouth/Throat: Oropharynx is clear and moist.   TTP over maxillary sinuses bilaterally   Eyes: Conjunctivae and EOM are normal. Pupils are equal, round, and reactive to light.   Neck: Normal range of motion. Neck supple.   Cardiovascular: Normal rate, regular rhythm and normal heart sounds.    Pulmonary/Chest: Effort normal and breath sounds normal. No respiratory distress. She has no wheezes. She has no rales.   Neurological: She is alert and oriented to person, place, and time. No cranial nerve deficit. Coordination normal.   Skin: Skin is warm and dry. No rash noted. No pallor.   Psychiatric: She has a normal mood and affect. Her behavior is normal. Judgment and thought content normal.   Nursing note and vitals reviewed.              Assessment/Plan:     1. Acute non-recurrent maxillary sinusitis  - azithromycin (ZITHROMAX) 250 MG Tab; 2 tabs po once today, 1 tab po qd x 4 days  Dispense: 6 Tab; Refill: 0    2. Other migraine without status migrainosus, not intractable  - SUMAtriptan Succinate (IMITREX) injection 6 mg; Inject 0.5 mL as instructed Once.    3. Nausea  - ondansetron (ZOFRAN ODT) dispertab 4 mg; Take 1 Tab by mouth Once.    Will treat headache with Imitrex, patient has used this medication in the past for severe headaches/migraines with relief.  I suspect patient is developing sinus infection due to sudden increase in symptoms today, will treat with Azithromycin for developing sinusitis.  Recommend monitoring symptoms and follow-up if symptoms change, get worse or new symptoms develop.

## 2018-10-23 ENCOUNTER — HOSPITAL ENCOUNTER (OUTPATIENT)
Dept: RADIOLOGY | Facility: MEDICAL CENTER | Age: 58
End: 2018-10-23
Attending: NURSE PRACTITIONER
Payer: COMMERCIAL

## 2018-10-23 DIAGNOSIS — K57.92 DIVERTICULITIS: ICD-10-CM

## 2018-10-23 DIAGNOSIS — R10.9 STOMACH ACHE: ICD-10-CM

## 2018-10-23 DIAGNOSIS — K58.9 IRRITABLE BOWEL SYNDROME, UNSPECIFIED TYPE: ICD-10-CM

## 2018-10-23 PROCEDURE — 74177 CT ABD & PELVIS W/CONTRAST: CPT

## 2018-10-23 PROCEDURE — 700117 HCHG RX CONTRAST REV CODE 255

## 2018-10-23 RX ADMIN — IOHEXOL 100 ML: 350 INJECTION, SOLUTION INTRAVENOUS at 13:29

## 2018-10-23 RX ADMIN — IOHEXOL 50 ML: 240 INJECTION, SOLUTION INTRATHECAL; INTRAVASCULAR; INTRAVENOUS; ORAL at 13:29

## 2018-11-17 ENCOUNTER — OFFICE VISIT (OUTPATIENT)
Dept: URGENT CARE | Facility: PHYSICIAN GROUP | Age: 58
End: 2018-11-17
Payer: COMMERCIAL

## 2018-11-17 VITALS
SYSTOLIC BLOOD PRESSURE: 112 MMHG | OXYGEN SATURATION: 95 % | DIASTOLIC BLOOD PRESSURE: 64 MMHG | WEIGHT: 240 LBS | HEIGHT: 68 IN | BODY MASS INDEX: 36.37 KG/M2 | HEART RATE: 93 BPM | RESPIRATION RATE: 20 BRPM | TEMPERATURE: 96.8 F

## 2018-11-17 DIAGNOSIS — T78.40XA ALLERGIC REACTION, INITIAL ENCOUNTER: Primary | ICD-10-CM

## 2018-11-17 PROCEDURE — 99214 OFFICE O/P EST MOD 30 MIN: CPT | Performed by: PHYSICIAN ASSISTANT

## 2018-11-17 RX ORDER — DIPHENOXYLATE HYDROCHLORIDE AND ATROPINE SULFATE 2.5; .025 MG/1; MG/1
TABLET ORAL
Refills: 5 | COMMUNITY
Start: 2018-11-02 | End: 2019-12-18

## 2018-11-17 RX ORDER — TRIAMCINOLONE ACETONIDE 1 MG/ML
LOTION TOPICAL
Qty: 1 BOTTLE | Refills: 0 | Status: SHIPPED | OUTPATIENT
Start: 2018-11-17 | End: 2019-12-18

## 2018-11-17 RX ORDER — RANITIDINE 150 MG/1
CAPSULE ORAL
COMMUNITY
End: 2020-05-06

## 2018-11-17 ASSESSMENT — ENCOUNTER SYMPTOMS
NAUSEA: 0
CHANGE IN BOWEL HABIT: 0
CHILLS: 0
CONSTIPATION: 0
ABDOMINAL PAIN: 0
COUGH: 0
FEVER: 0
VOMITING: 0
SHORTNESS OF BREATH: 0
DIARRHEA: 0

## 2018-11-17 NOTE — PATIENT INSTRUCTIONS
Zyrtec every morning   Benadryl every night   Topical steroid twice a day     Gentle cream over the steroid cream       Drug Rash  Introduction  A drug rash is a change in the color or texture of the skin that is caused by a drug. It can develop minutes, hours, or days after the person takes the drug.  What are the causes?  This condition is usually caused by a drug allergy. It can also be caused by exposure to sunlight after taking a drug that makes the skin sensitive to light. Drugs that commonly cause rashes include:  · Penicillin.  · Antibiotic medicines.  · Medicines that treat seizures.  · Medicines that treat cancer (chemotherapy).  · Aspirin and other nonsteroidal anti-inflammatory drugs (NSAIDs).  · Injectable dyes that contain iodine.  · Insulin.  What are the signs or symptoms?  Symptoms of this condition include:  · Redness.  · Tiny bumps.  · Peeling.  · Itching.  · Itchy welts (hives).  · Swelling.  The rash may appear on a small area of skin or all over the body.  How is this diagnosed?  To diagnose the condition, your health care provider will do a physical exam. He or she may also order tests to find out which drug caused the rash. Tests to find the cause of a rash include:  · Skin tests.  · Blood tests.  · Drug challenge. For this test, you stop taking all of the drugs that you do not need to take, and then you start taking them again by adding back one of the drugs at a time.  How is this treated?  A drug rash may be treated with medicines, including:  · Antihistamines. These may be given to relieve itching.  · An NSAID. This may be given to reduce swelling and treat pain.  · A steroid drug. This may be given to reduce swelling.  The rash usually goes away when the person stops taking the drug that caused it.  Follow these instructions at home:  · Take medicines only as directed by your health care provider.  · Let all of your health care providers know about any drug reactions you have had in the  past.  · If you have hives, take a cool shower or use a cool compress to relieve itchiness.  Contact a health care provider if:  · You have a fever.  · Your rash is not going away.  · Your rash gets worse.  · Your rash comes back.  · You have wheezing or coughing.  Get help right away if:  · You start to have breathing problems.  · You start to have shortness of breath.  · You face or throat starts to swell.  · You have severe weakness with dizziness or fainting.  · You have chest pain.  This information is not intended to replace advice given to you by your health care provider. Make sure you discuss any questions you have with your health care provider.  Document Released: 01/25/2006 Document Revised: 05/25/2017 Document Reviewed: 10/14/2015  © 2017 [employer]vier

## 2018-11-17 NOTE — PROGRESS NOTES
Subjective:   Anette Villegas is a 58 y.o. female who presents for Allergic Reaction (x last night// hives all over body// ichy// slight SOB )        Allergic Reaction   This is a new problem. The current episode started yesterday. The problem occurs constantly. The problem has been gradually worsening. Associated symptoms include a rash. Pertinent negatives include no abdominal pain, change in bowel habit, chills, congestion, coughing, fever, nausea or vomiting. She has tried nothing for the symptoms.     Pt started new medication on Lomotil and Zantac on Nov 6th. Rash started yesterday. Pt has hx dry skin. Hx of graves. Pt takes diphenhydramine 50 mg QHS.  Patient has a long history of allergies and sensitive skin.    Review of Systems   Constitutional: Negative for chills, fever and malaise/fatigue.   HENT: Negative for congestion.    Respiratory: Negative for cough and shortness of breath.    Gastrointestinal: Negative for abdominal pain, change in bowel habit, constipation, diarrhea, nausea and vomiting.   Skin: Positive for itching and rash.   All other systems reviewed and are negative.      PMH:  has a past medical history of Arthritis; Chronic airway obstruction, not elsewhere classified; Dental disorder; Grave's disease; Hypertension; Pain; and Personal history of venous thrombosis and embolism (6/23/2009).  MEDS:   Current Outpatient Prescriptions:   •  diphenoxylate-atropine (LOMOTIL) 2.5-0.025 MG Tab, , Disp: , Rfl: 5  •  ranitidine (ZANTAC) 150 MG capsule, Take  by mouth., Disp: , Rfl:   •  triamcinolone (KENALOG) 0.1 % lotion, Apply to affected area twice a day, Disp: 1 Bottle, Rfl: 0  •  celecoxib (CELEBREX) 200 MG Cap, Take 200 mg by mouth 2 times a day., Disp: , Rfl:   •  doxepin (SINEQUAN) 25 MG Cap, Take 25 mg by mouth every evening., Disp: , Rfl:   •  METHIMAZOLE 5 MG PO TABS, Take 0.5 Tabs by mouth every day., Disp: , Rfl:   •  BENAZEPRIL HCL 20 MG PO TABS, Take 1 Tab by mouth every day.,  "Disp: , Rfl:   •  LOVASTATIN 40 MG PO TABS, Take 1 Tab by mouth every day., Disp: , Rfl:   •  CYCLOBENZAPRINE HCL 10 MG PO TABS, Take 1 Tab by mouth 2 times a day., Disp: , Rfl:   •  HYDROCODONE-ACETAMINOPHEN  MG PO TABS, Take 1 Tab by mouth every 6 hours as needed for Mild Pain., Disp: , Rfl:   •  fluticasone (FLONASE) 50 MCG/ACT nasal spray, Spray 1 Spray in nose every day., Disp: , Rfl:   •  Albuterol (VENTOLIN INH), Inhale  by mouth., Disp: , Rfl:   •  nystatin (MYCOSTATIN) 702541 UNIT/ML Suspension, Take 500,000 Units by mouth 4 times a day., Disp: , Rfl:   •  ofloxacin (OCUFLOX) 0.3 % Solution, Place 1 Drop in both eyes 4 times a day., Disp: , Rfl:   •  albuterol (PROVENTIL) 2.5mg/3ml Nebu Soln solution for nebulization, 3 mL by Nebulization route every four hours as needed for Shortness of Breath., Disp: 75 mL, Rfl: 0  ALLERGIES:   Allergies   Allergen Reactions   • Bee    • Penicillins    • Soap    • Tape    • Tetracycline      SURGHX:   Past Surgical History:   Procedure Laterality Date   • ACL RECONSTRUCTION SCOPE  7/9/2009    Performed by RENEE LEVI at SURGERY [facility] ORS   • LUMBAR FUSION POSTERIOR  2004   • PB DISKECTOMY,PERCUTANEOUS LUMBAR  2001   • HYSTERECTOMY, VAGINAL  1999   • TUBAL LIGATION  1994   • TONSILLECTOMY  1968     SOCHX:  reports that she has been smoking Cigarettes.  She has a 30.00 pack-year smoking history. She has never used smokeless tobacco. She reports that she does not drink alcohol or use drugs.  Family History   Problem Relation Age of Onset   • Cancer Maternal Grandmother    • Cancer Maternal Aunt         Objective:   /64 (BP Location: Right arm, Patient Position: Sitting)   Pulse 93   Temp 36 °C (96.8 °F) (Temporal)   Resp 20   Ht 1.727 m (5' 8\")   Wt 108.9 kg (240 lb)   SpO2 95%   BMI 36.49 kg/m²     Physical Exam   Constitutional: She is oriented to person, place, and time. She appears well-developed and well-nourished. No distress.   HENT: "   Head: Normocephalic and atraumatic.   Eyes: Pupils are equal, round, and reactive to light. Conjunctivae are normal.   Cardiovascular: Normal rate and regular rhythm.    Pulmonary/Chest: Effort normal and breath sounds normal.   Neurological: She is alert and oriented to person, place, and time.   Skin: Skin is warm and dry.   Diffuse maculopapular rash with xerosis on abdomen and upper extremity.   Psychiatric: She has a normal mood and affect. Her behavior is normal.   Vitals reviewed.        Assessment/Plan:     1. Allergic reaction, initial encounter  triamcinolone (KENALOG) 0.1 % lotion     Advised patient to discontinue zantac, imodium and all suspicious products including ingredients with fragrance, dyes.  Patient can introduce products one by one monitoring for signs of recurrence of rash.  Consider switching detergent.  Recommended starting gentle cleanser and lotions for face, Cetaphil/CeraVe.   We discussed appropriate use and AEs of prolonged use of steroid.  Consider food log if symptoms persist.  Treatment plan written down and dermatitis educational handout provided.     Follow-up with primary care provider within 7-10 days. Red flags and strict emergency room precautions discussed.  Patient appears understanding of information

## 2019-05-22 ENCOUNTER — OFFICE VISIT (OUTPATIENT)
Dept: URGENT CARE | Facility: PHYSICIAN GROUP | Age: 59
End: 2019-05-22
Payer: COMMERCIAL

## 2019-05-22 VITALS
DIASTOLIC BLOOD PRESSURE: 84 MMHG | RESPIRATION RATE: 16 BRPM | SYSTOLIC BLOOD PRESSURE: 136 MMHG | WEIGHT: 245 LBS | TEMPERATURE: 96.7 F | BODY MASS INDEX: 37.25 KG/M2 | OXYGEN SATURATION: 97 % | HEART RATE: 92 BPM

## 2019-05-22 DIAGNOSIS — J06.9 UPPER RESPIRATORY TRACT INFECTION, UNSPECIFIED TYPE: ICD-10-CM

## 2019-05-22 DIAGNOSIS — J01.00 ACUTE MAXILLARY SINUSITIS, RECURRENCE NOT SPECIFIED: ICD-10-CM

## 2019-05-22 PROCEDURE — 99214 OFFICE O/P EST MOD 30 MIN: CPT | Performed by: PHYSICIAN ASSISTANT

## 2019-05-22 RX ORDER — METHYLPREDNISOLONE 4 MG/1
TABLET ORAL
Qty: 21 TAB | Refills: 0 | Status: SHIPPED | OUTPATIENT
Start: 2019-05-22 | End: 2019-12-18

## 2019-05-22 RX ORDER — AZITHROMYCIN 250 MG/1
TABLET, FILM COATED ORAL
Qty: 6 TAB | Refills: 0 | Status: SHIPPED | OUTPATIENT
Start: 2019-05-22 | End: 2019-12-18

## 2019-05-22 RX ORDER — BENZONATATE 200 MG/1
200 CAPSULE ORAL 3 TIMES DAILY PRN
Qty: 30 CAP | Refills: 0 | Status: SHIPPED | OUTPATIENT
Start: 2019-05-22 | End: 2019-11-23

## 2019-05-22 ASSESSMENT — ENCOUNTER SYMPTOMS
WHEEZING: 1
SPUTUM PRODUCTION: 1
HEADACHES: 1
SHORTNESS OF BREATH: 0
HOARSE VOICE: 1
COUGH: 1
ABDOMINAL PAIN: 0
CHILLS: 0
DIARRHEA: 0
SWOLLEN GLANDS: 0
SINUS PRESSURE: 1
FEVER: 0
FOCAL WEAKNESS: 0
SINUS PAIN: 1
SENSORY CHANGE: 0
VOMITING: 0
MYALGIAS: 0
SORE THROAT: 1
NAUSEA: 0
TINGLING: 0
PALPITATIONS: 0

## 2019-05-23 NOTE — PROGRESS NOTES
Subjective:      Anette Villegas is a 59 y.o. female who presents with Sinus Problem (x 3 weeks )            Sinus Problem   This is a new problem. Episode onset: 3 weeks  The problem has been gradually worsening since onset. There has been no fever. Associated symptoms include congestion, coughing (productive- green sputum ), ear pain (right ear ), headaches, a hoarse voice, sinus pressure and a sore throat. Pertinent negatives include no chills, shortness of breath, sneezing or swollen glands. Treatments tried: albuterol  The treatment provided no relief.     Past Medical History:   Diagnosis Date   • Arthritis    • Chronic airway obstruction, not elsewhere classified    • Dental disorder     dentures   • Grave's disease    • Hypertension    • Pain     lower back   • Personal history of venous thrombosis and embolism 6/23/2009    right leg       Past Surgical History:   Procedure Laterality Date   • ACL RECONSTRUCTION SCOPE  7/9/2009    Performed by RENEE LEVI at SURGERY [facility] ORS   • LUMBAR FUSION POSTERIOR  2004   • PB DISKECTOMY,PERCUTANEOUS LUMBAR  2001   • HYSTERECTOMY, VAGINAL  1999   • TUBAL LIGATION  1994   • TONSILLECTOMY  1968       Family History   Problem Relation Age of Onset   • Cancer Maternal Grandmother    • Cancer Maternal Aunt        Allergies   Allergen Reactions   • Bee    • Penicillins    • Soap    • Tape    • Tetracycline        Medications, Allergies, and current problem list reviewed today in Epic    Review of Systems   Constitutional: Negative for chills, fever and malaise/fatigue.   HENT: Positive for congestion, ear pain (right ear ), hoarse voice, sinus pain, sinus pressure and sore throat. Negative for ear discharge and sneezing.    Respiratory: Positive for cough (productive- green sputum ), sputum production and wheezing. Negative for shortness of breath.    Cardiovascular: Negative for chest pain, palpitations and leg swelling.   Gastrointestinal: Negative for  abdominal pain, diarrhea, nausea and vomiting.   Musculoskeletal: Negative for myalgias.   Skin: Negative for rash.   Neurological: Positive for headaches. Negative for tingling, sensory change and focal weakness.     All other systems reviewed and are negative.        Objective:     /84   Pulse 92   Temp 35.9 °C (96.7 °F) (Temporal)   Resp 16   Wt 111.1 kg (245 lb)   SpO2 97%   BMI 37.25 kg/m²      Physical Exam   Constitutional: She is oriented to person, place, and time. She appears well-developed and well-nourished. No distress.   HENT:   Head: Normocephalic and atraumatic.   Right Ear: Tympanic membrane, external ear and ear canal normal.   Left Ear: Tympanic membrane, external ear and ear canal normal.   Nose: Mucosal edema and rhinorrhea present. Right sinus exhibits maxillary sinus tenderness. Left sinus exhibits maxillary sinus tenderness.   Mouth/Throat: Uvula is midline, oropharynx is clear and moist and mucous membranes are normal.   Eyes: Conjunctivae are normal.   Neck: Neck supple.   Cardiovascular: Normal rate, regular rhythm and normal heart sounds.  Exam reveals no gallop and no friction rub.    No murmur heard.  Pulmonary/Chest: Effort normal. No respiratory distress. She has wheezes (mild wheezes in upper lung fields ). She has no rales.   Lymphadenopathy:     She has no cervical adenopathy.   Neurological: She is alert and oriented to person, place, and time. No cranial nerve deficit.   Skin: Skin is warm and dry. No rash noted.   Psychiatric: She has a normal mood and affect. Her behavior is normal. Judgment and thought content normal.               Assessment/Plan:     1. Acute maxillary sinusitis, recurrence not specified    - azithromycin (ZITHROMAX) 250 MG Tab; Take as directed on package. 1 pack.  Dispense: 6 Tab; Refill: 0  - MethylPREDNISolone (MEDROL DOSEPAK) 4 MG Tablet Therapy Pack; Take as directed on package. 1 pack.  Dispense: 21 Tab; Refill: 0    2. Upper respiratory  tract infection, unspecified type    - azithromycin (ZITHROMAX) 250 MG Tab; Take as directed on package. 1 pack.  Dispense: 6 Tab; Refill: 0  - MethylPREDNISolone (MEDROL DOSEPAK) 4 MG Tablet Therapy Pack; Take as directed on package. 1 pack.  Dispense: 21 Tab; Refill: 0  - benzonatate (TESSALON) 200 MG capsule; Take 1 Cap by mouth 3 times a day as needed for Cough.  Dispense: 30 Cap; Refill: 0     Differential diagnoses, Supportive care, and indications for immediate follow-up discussed with patient.   Instructed to return to clinic or nearest emergency department for any change in condition, further concerns, or worsening of symptoms.    The patient demonstrated a good understanding and agreed with the treatment plan.    Michelle Ellis P.A.-C.

## 2019-05-31 ENCOUNTER — APPOINTMENT (OUTPATIENT)
Dept: RADIOLOGY | Facility: MEDICAL CENTER | Age: 59
End: 2019-05-31
Attending: EMERGENCY MEDICINE
Payer: COMMERCIAL

## 2019-05-31 ENCOUNTER — HOSPITAL ENCOUNTER (EMERGENCY)
Facility: MEDICAL CENTER | Age: 59
End: 2019-05-31
Attending: EMERGENCY MEDICINE
Payer: COMMERCIAL

## 2019-05-31 VITALS
HEART RATE: 100 BPM | HEIGHT: 68 IN | OXYGEN SATURATION: 96 % | SYSTOLIC BLOOD PRESSURE: 93 MMHG | TEMPERATURE: 97.4 F | RESPIRATION RATE: 16 BRPM | DIASTOLIC BLOOD PRESSURE: 66 MMHG | BODY MASS INDEX: 35.02 KG/M2 | WEIGHT: 231.04 LBS

## 2019-05-31 DIAGNOSIS — R11.2 NON-INTRACTABLE VOMITING WITH NAUSEA, UNSPECIFIED VOMITING TYPE: ICD-10-CM

## 2019-05-31 DIAGNOSIS — R10.9 ABDOMINAL PAIN, UNSPECIFIED ABDOMINAL LOCATION: ICD-10-CM

## 2019-05-31 LAB
ALBUMIN SERPL BCP-MCNC: 4.7 G/DL (ref 3.2–4.9)
ALBUMIN/GLOB SERPL: 1.6 G/DL
ALP SERPL-CCNC: 91 U/L (ref 30–99)
ALT SERPL-CCNC: 15 U/L (ref 2–50)
ANION GAP SERPL CALC-SCNC: 13 MMOL/L (ref 0–11.9)
APPEARANCE UR: CLEAR
AST SERPL-CCNC: 19 U/L (ref 12–45)
BASOPHILS # BLD AUTO: 0.5 % (ref 0–1.8)
BASOPHILS # BLD: 0.1 K/UL (ref 0–0.12)
BILIRUB SERPL-MCNC: 0.6 MG/DL (ref 0.1–1.5)
BILIRUB UR QL STRIP.AUTO: NEGATIVE
BUN SERPL-MCNC: 22 MG/DL (ref 8–22)
CALCIUM SERPL-MCNC: 9.1 MG/DL (ref 8.5–10.5)
CHLORIDE SERPL-SCNC: 99 MMOL/L (ref 96–112)
CO2 SERPL-SCNC: 18 MMOL/L (ref 20–33)
COLOR UR: YELLOW
CREAT SERPL-MCNC: 1.19 MG/DL (ref 0.5–1.4)
EOSINOPHIL # BLD AUTO: 0.23 K/UL (ref 0–0.51)
EOSINOPHIL NFR BLD: 1.2 % (ref 0–6.9)
ERYTHROCYTE [DISTWIDTH] IN BLOOD BY AUTOMATED COUNT: 48.8 FL (ref 35.9–50)
GLOBULIN SER CALC-MCNC: 3 G/DL (ref 1.9–3.5)
GLUCOSE SERPL-MCNC: 141 MG/DL (ref 65–99)
GLUCOSE UR STRIP.AUTO-MCNC: NEGATIVE MG/DL
HCT VFR BLD AUTO: 51.8 % (ref 37–47)
HGB BLD-MCNC: 17.2 G/DL (ref 12–16)
IMM GRANULOCYTES # BLD AUTO: 0.16 K/UL (ref 0–0.11)
IMM GRANULOCYTES NFR BLD AUTO: 0.9 % (ref 0–0.9)
KETONES UR STRIP.AUTO-MCNC: 15 MG/DL
LEUKOCYTE ESTERASE UR QL STRIP.AUTO: NEGATIVE
LIPASE SERPL-CCNC: 7 U/L (ref 11–82)
LYMPHOCYTES # BLD AUTO: 3.78 K/UL (ref 1–4.8)
LYMPHOCYTES NFR BLD: 20.1 % (ref 22–41)
MCH RBC QN AUTO: 32.9 PG (ref 27–33)
MCHC RBC AUTO-ENTMCNC: 33.2 G/DL (ref 33.6–35)
MCV RBC AUTO: 99 FL (ref 81.4–97.8)
MICRO URNS: ABNORMAL
MONOCYTES # BLD AUTO: 1.22 K/UL (ref 0–0.85)
MONOCYTES NFR BLD AUTO: 6.5 % (ref 0–13.4)
NEUTROPHILS # BLD AUTO: 13.31 K/UL (ref 2–7.15)
NEUTROPHILS NFR BLD: 70.8 % (ref 44–72)
NITRITE UR QL STRIP.AUTO: NEGATIVE
NRBC # BLD AUTO: 0 K/UL
NRBC BLD-RTO: 0 /100 WBC
PH UR STRIP.AUTO: 5.5 [PH]
PLATELET # BLD AUTO: 265 K/UL (ref 164–446)
PMV BLD AUTO: 10.9 FL (ref 9–12.9)
POTASSIUM SERPL-SCNC: 4.9 MMOL/L (ref 3.6–5.5)
PROT SERPL-MCNC: 7.7 G/DL (ref 6–8.2)
PROT UR QL STRIP: NEGATIVE MG/DL
RBC # BLD AUTO: 5.23 M/UL (ref 4.2–5.4)
RBC UR QL AUTO: NEGATIVE
SODIUM SERPL-SCNC: 130 MMOL/L (ref 135–145)
SP GR UR STRIP.AUTO: 1.02
UROBILINOGEN UR STRIP.AUTO-MCNC: 1 MG/DL
WBC # BLD AUTO: 18.8 K/UL (ref 4.8–10.8)

## 2019-05-31 PROCEDURE — 96376 TX/PRO/DX INJ SAME DRUG ADON: CPT

## 2019-05-31 PROCEDURE — 80053 COMPREHEN METABOLIC PANEL: CPT

## 2019-05-31 PROCEDURE — 81003 URINALYSIS AUTO W/O SCOPE: CPT

## 2019-05-31 PROCEDURE — 96375 TX/PRO/DX INJ NEW DRUG ADDON: CPT

## 2019-05-31 PROCEDURE — 99285 EMERGENCY DEPT VISIT HI MDM: CPT

## 2019-05-31 PROCEDURE — 85025 COMPLETE CBC W/AUTO DIFF WBC: CPT

## 2019-05-31 PROCEDURE — 96374 THER/PROPH/DIAG INJ IV PUSH: CPT

## 2019-05-31 PROCEDURE — 700111 HCHG RX REV CODE 636 W/ 250 OVERRIDE (IP): Performed by: EMERGENCY MEDICINE

## 2019-05-31 PROCEDURE — 700117 HCHG RX CONTRAST REV CODE 255: Performed by: EMERGENCY MEDICINE

## 2019-05-31 PROCEDURE — 74177 CT ABD & PELVIS W/CONTRAST: CPT

## 2019-05-31 PROCEDURE — 83690 ASSAY OF LIPASE: CPT

## 2019-05-31 RX ORDER — MORPHINE SULFATE 4 MG/ML
4 INJECTION, SOLUTION INTRAMUSCULAR; INTRAVENOUS ONCE
Status: COMPLETED | OUTPATIENT
Start: 2019-05-31 | End: 2019-05-31

## 2019-05-31 RX ORDER — PROCHLORPERAZINE MALEATE 10 MG
10 TABLET ORAL EVERY 6 HOURS PRN
Qty: 30 TAB | Refills: 0 | Status: SHIPPED | OUTPATIENT
Start: 2019-05-31 | End: 2019-12-18

## 2019-05-31 RX ADMIN — PROCHLORPERAZINE EDISYLATE 10 MG: 5 INJECTION INTRAMUSCULAR; INTRAVENOUS at 18:37

## 2019-05-31 RX ADMIN — PROCHLORPERAZINE EDISYLATE 10 MG: 5 INJECTION INTRAMUSCULAR; INTRAVENOUS at 19:23

## 2019-05-31 RX ADMIN — MORPHINE SULFATE 4 MG: 4 INJECTION INTRAVENOUS at 18:37

## 2019-05-31 RX ADMIN — IOHEXOL 100 ML: 350 INJECTION, SOLUTION INTRAVENOUS at 19:00

## 2019-05-31 NOTE — ED TRIAGE NOTES
Amb to triage w/ c/o L sided abd pain, n/v/d x 2 days.  Reports a hx of IBS.  Pt also reports recently taking Zithromax for an URI, concerned symptoms related to that.

## 2019-06-01 NOTE — ED NOTES
Patient discharge instruction provided, patient verbally understood, denies any questions.  Patient ambulated to lobby steady gait with family.

## 2019-06-01 NOTE — ED PROVIDER NOTES
ED Provider Note    CHIEF COMPLAINT  Chief Complaint   Patient presents with   • Nausea/Vomiting/Diarrhea   • Abdominal Pain       HPI  Anette Villegas is a 59 y.o. female who presents for evaluation of abdominal pain, vomiting, and diarrhea.  She is had symptoms over the past 3 days.  Does not sound as if she is had any fevers.  She has a history of irritable bowel syndrome.  In reviewing her records it looks as if she has had colitis in the past.  She denies any urinary symptoms.  Her pain is mostly in the left abdomen region.  She is status post hysterectomy.    REVIEW OF SYSTEMS  See HPI for further details. All other systems negative.    PAST MEDICAL HISTORY  Past Medical History:   Diagnosis Date   • Arthritis    • Chronic airway obstruction, not elsewhere classified    • Dental disorder     dentures   • Grave's disease    • Hypertension    • IBS (irritable bowel syndrome)    • Pain     lower back   • Personal history of venous thrombosis and embolism 6/23/2009    right leg       FAMILY HISTORY  Family History   Problem Relation Age of Onset   • Cancer Maternal Grandmother    • Cancer Maternal Aunt        SOCIAL HISTORY  Social History     Social History   • Marital status:      Spouse name: N/A   • Number of children: N/A   • Years of education: N/A     Social History Main Topics   • Smoking status: Current Every Day Smoker     Packs/day: 1.00     Years: 30.00     Types: Cigarettes   • Smokeless tobacco: Never Used   • Alcohol use No   • Drug use: No   • Sexual activity: Not Currently     Other Topics Concern   • Not on file     Social History Narrative   • No narrative on file       SURGICAL HISTORY  Past Surgical History:   Procedure Laterality Date   • ACL RECONSTRUCTION SCOPE  7/9/2009    Performed by RENEE LEVI at SURGERY [facility]S   • LUMBAR FUSION POSTERIOR  2004   • PB DISKECTOMY,PERCUTANEOUS LUMBAR  2001   • HYSTERECTOMY, VAGINAL  1999   • TUBAL LIGATION  1994   •  "TONSILLECTOMY  1968       CURRENT MEDICATIONS  Home Medications    **Home medications have not yet been reviewed for this encounter**         ALLERGIES  Allergies   Allergen Reactions   • Bee    • Penicillins    • Soap    • Tape    • Tetracycline        PHYSICAL EXAM  VITAL SIGNS: /80   Pulse (!) 105   Temp 36.3 °C (97.4 °F) (Temporal)   Resp 18   Ht 1.727 m (5' 8\")   Wt 104.8 kg (231 lb 0.7 oz)   SpO2 92%   BMI 35.13 kg/m²   Constitutional: Well developed, obese, No acute distress, Non-toxic appearance.   HENT: Normocephalic, Atraumatic.  Eyes:  EOMI, Conjunctiva normal, No discharge.   Cardiovascular: Slight tachycardia, Normal rhythm, No murmurs, No rubs, No gallops.   Thorax & Lungs: Clear to auscultation without wheezes, rales, or rhonchi.    Abdomen: Obese, soft, minimal left-sided abdominal tenderness without guarding or rebound.  No masses.  Skin: Warm, Dry.  Musculoskeletal: Good range of motion in all major joints.   Neurologic: Awake and alert, No focal deficits noted.       RADIOLOGY/PROCEDURES  CT-ABDOMEN-PELVIS WITH   Final Result      1.  No acute findings.   2.  Normal appendix.   3.  No gross evidence for colitis or diverticulitis.            COURSE & MEDICAL DECISION MAKING  Pertinent Labs & Imaging studies reviewed. (See chart for details)  Is a 59-year-old here for evaluation of abdominal pain and vomiting.  She is afebrile.  Her physical exam is really unremarkable only with minimal left-sided abdominal tenderness.  An IV is established and is treated with morphine and Zofran.  She is had no further vomiting.  Laboratories include chemistries which are normal with the exception of a glucose of 141 and a sodium being slightly low 130.  Bicarb is minimally low at 18.  Liver function studies and lipase are normal.  CBC shows a white count of 18 with a normal differential.  I suspect this is a stress response.  CT scan of the abdomen and pelvis shows no acute findings.  Urinalysis is " negative.  Upon repeat evaluation the patient is sitting up and ambulating with no difficulty.  I discussed all of the studies with the patient and her son.  She would like to go home.  I think that is absolutely fine at this point.  I will provide her a prescription for Compazine that she can have at home.  She already is on chronic pain medications which she will take for her discomfort.  She actually has follow-up with her gastroenterologist on Monday which I think is excellent.  She is given a discharge instruction sheet on abdominal pain and on vomiting.    FINAL IMPRESSION  1.  Abdominal pain  2.  Vomiting  3.         Electronically signed by: Keron Darling, 5/31/2019 6:10 PM

## 2019-06-09 ENCOUNTER — HOSPITAL ENCOUNTER (OUTPATIENT)
Dept: RADIOLOGY | Facility: MEDICAL CENTER | Age: 59
End: 2019-06-09
Attending: INTERNAL MEDICINE
Payer: COMMERCIAL

## 2019-06-09 DIAGNOSIS — R10.13 ABDOMINAL PAIN, EPIGASTRIC: ICD-10-CM

## 2019-06-09 PROCEDURE — 76700 US EXAM ABDOM COMPLETE: CPT

## 2019-09-19 ENCOUNTER — TELEPHONE (OUTPATIENT)
Dept: ENDOCRINOLOGY | Facility: MEDICAL CENTER | Age: 59
End: 2019-09-19

## 2019-09-19 NOTE — TELEPHONE ENCOUNTER
1. Caller Name: Anette                                         Call Back Number: [phone] (home)       Patient approves a detailed voicemail message: yes    Contacted patient and left a voice. Dr. Harris stated that it would be hard for us to give her an answer towards her still taking the medication because she is a new patient and because the last tsh labs done that he is able to see were done in 2015. Dr. Harris stated we could try and find her a sooner appointment than nov. but until she is officially seen its hard to give her a accurate answer.

## 2019-09-19 NOTE — TELEPHONE ENCOUNTER
Patient made new patient appointment but wants to know if she should continue taking her thyroid meds as her other provider told her she is overmedicated please advise

## 2019-10-21 ENCOUNTER — HOSPITAL ENCOUNTER (OUTPATIENT)
Dept: RADIOLOGY | Facility: MEDICAL CENTER | Age: 59
End: 2019-10-21
Attending: FAMILY MEDICINE
Payer: COMMERCIAL

## 2019-10-21 ENCOUNTER — OFFICE VISIT (OUTPATIENT)
Dept: URGENT CARE | Facility: PHYSICIAN GROUP | Age: 59
End: 2019-10-21
Payer: COMMERCIAL

## 2019-10-21 VITALS
HEIGHT: 68 IN | WEIGHT: 234 LBS | OXYGEN SATURATION: 96 % | DIASTOLIC BLOOD PRESSURE: 62 MMHG | BODY MASS INDEX: 35.46 KG/M2 | HEART RATE: 90 BPM | RESPIRATION RATE: 24 BRPM | SYSTOLIC BLOOD PRESSURE: 98 MMHG | TEMPERATURE: 97.1 F

## 2019-10-21 DIAGNOSIS — J44.1 COPD EXACERBATION (HCC): ICD-10-CM

## 2019-10-21 PROCEDURE — 99214 OFFICE O/P EST MOD 30 MIN: CPT | Performed by: FAMILY MEDICINE

## 2019-10-21 PROCEDURE — 71046 X-RAY EXAM CHEST 2 VIEWS: CPT

## 2019-10-21 RX ORDER — PREDNISONE 20 MG/1
40 TABLET ORAL DAILY
Qty: 10 TAB | Refills: 0 | Status: SHIPPED | OUTPATIENT
Start: 2019-10-21 | End: 2019-10-26

## 2019-10-21 RX ORDER — AZITHROMYCIN 250 MG/1
TABLET, FILM COATED ORAL
Qty: 6 TAB | Refills: 0 | Status: SHIPPED | OUTPATIENT
Start: 2019-10-21 | End: 2019-12-18

## 2019-10-21 RX ORDER — BENZONATATE 200 MG/1
200 CAPSULE ORAL 3 TIMES DAILY PRN
Qty: 30 CAP | Refills: 0 | Status: SHIPPED | OUTPATIENT
Start: 2019-10-21 | End: 2019-11-23

## 2019-10-21 ASSESSMENT — ENCOUNTER SYMPTOMS
WEIGHT LOSS: 0
VOMITING: 0
EYE REDNESS: 0
NAUSEA: 0
MYALGIAS: 0
EYE DISCHARGE: 0
PALPITATIONS: 0

## 2019-10-21 NOTE — PROGRESS NOTES
"Subjective:      Anette Yost is a 59 y.o. female who presents with Cough (x 2 weeks, Pt states she has been having a little SOB ) and Other (Pt states she failed her PFT and now needs a chest Xray & medication & a note to return to work)            2 weeks productive cough without blood in sputum. No fever/chills. +SOB/wheeze. +PMH COPD. +PMH pneumonia. +nasal congestion. +ST. OTC cough drops and vivian seltzer cold with short term relief. Using albuterol in the morning. No other aggravating or alleviating factors.        Review of Systems   Constitutional: Negative for malaise/fatigue and weight loss.   Eyes: Negative for discharge and redness.   Cardiovascular: Negative for chest pain, palpitations and leg swelling.   Gastrointestinal: Negative for nausea and vomiting.   Musculoskeletal: Negative for joint pain and myalgias.   Skin: Negative for itching and rash.     .  Medications, Allergies, and current problem list reviewed today in Epic       Objective:     BP (!) 98/62 (BP Location: Left arm, Patient Position: Sitting, BP Cuff Size: Adult)   Pulse 90   Temp 36.2 °C (97.1 °F) (Temporal)   Resp (!) 24   Ht 1.727 m (5' 8\")   Wt 106.1 kg (234 lb)   SpO2 96%   BMI 35.58 kg/m²      Physical Exam   Constitutional: She is oriented to person, place, and time. She appears well-developed and well-nourished. No distress.   HENT:   Head: Normocephalic and atraumatic.   Eyes: Conjunctivae are normal.   Neck: Neck supple. No JVD present.   Cardiovascular: Normal rate, regular rhythm and normal heart sounds.   No murmur heard.  Pulmonary/Chest: Effort normal. She has wheezes (few).   Musculoskeletal: She exhibits no edema or tenderness.   Neurological: She is alert and oriented to person, place, and time.   Skin: Skin is warm and dry. No rash noted.               Assessment/Plan:   cxr no active disease per radiology    1. COPD exacerbation (HCC)  DX-CHEST-2 VIEWS    predniSONE (DELTASONE) 20 MG Tab    " azithromycin (ZITHROMAX) 250 MG Tab    DME Nebulizer    benzonatate (TESSALON) 200 MG capsule    REFERRAL TO PULMONOLOGY     Differential diagnosis, natural history, supportive care, and indications for immediate follow-up discussed at length.     Patient brought in PFT from work showing FEV1 50% of predicted. She is not cleared for work at this time. Given severity, will refer to pulmonology for further evaluation and treatment.     Continue Flovent  Stop smoking.

## 2019-10-28 ENCOUNTER — TELEPHONE (OUTPATIENT)
Dept: PULMONOLOGY | Facility: HOSPICE | Age: 59
End: 2019-10-28

## 2019-10-28 DIAGNOSIS — R06.02 SOB (SHORTNESS OF BREATH): ICD-10-CM

## 2019-10-29 ENCOUNTER — OFFICE VISIT (OUTPATIENT)
Dept: PULMONOLOGY | Facility: HOSPICE | Age: 59
End: 2019-10-29
Payer: COMMERCIAL

## 2019-10-29 ENCOUNTER — APPOINTMENT (OUTPATIENT)
Dept: PULMONOLOGY | Facility: HOSPICE | Age: 59
End: 2019-10-29
Payer: COMMERCIAL

## 2019-10-29 VITALS
WEIGHT: 240 LBS | BODY MASS INDEX: 37.67 KG/M2 | RESPIRATION RATE: 18 BRPM | DIASTOLIC BLOOD PRESSURE: 64 MMHG | TEMPERATURE: 97.9 F | SYSTOLIC BLOOD PRESSURE: 144 MMHG | HEART RATE: 89 BPM | OXYGEN SATURATION: 98 % | HEIGHT: 67 IN

## 2019-10-29 DIAGNOSIS — J98.4 MIXED RESTRICTIVE AND OBSTRUCTIVE LUNG DISEASE (HCC): ICD-10-CM

## 2019-10-29 DIAGNOSIS — Z72.0 TOBACCO USE: ICD-10-CM

## 2019-10-29 DIAGNOSIS — E66.9 CLASS 2 OBESITY WITH BODY MASS INDEX (BMI) OF 37.0 TO 37.9 IN ADULT, UNSPECIFIED OBESITY TYPE, UNSPECIFIED WHETHER SERIOUS COMORBIDITY PRESENT: ICD-10-CM

## 2019-10-29 DIAGNOSIS — J43.9 MIXED RESTRICTIVE AND OBSTRUCTIVE LUNG DISEASE (HCC): ICD-10-CM

## 2019-10-29 PROCEDURE — 99204 OFFICE O/P NEW MOD 45 MIN: CPT | Performed by: INTERNAL MEDICINE

## 2019-10-29 RX ORDER — PROMETHAZINE HYDROCHLORIDE 25 MG/1
25 TABLET ORAL EVERY 6 HOURS PRN
COMMUNITY

## 2019-10-29 RX ORDER — CLONIDINE HYDROCHLORIDE 0.1 MG/1
TABLET, EXTENDED RELEASE ORAL
COMMUNITY
End: 2020-08-09

## 2019-10-29 RX ORDER — ONDANSETRON 8 MG/1
8 TABLET, ORALLY DISINTEGRATING ORAL EVERY 6 HOURS PRN
Refills: 6 | COMMUNITY
Start: 2019-09-23

## 2019-10-29 RX ORDER — DEXAMETHASONE 4 MG/1
TABLET ORAL
Refills: 5 | COMMUNITY
Start: 2019-09-23 | End: 2019-12-09

## 2019-10-29 ASSESSMENT — ENCOUNTER SYMPTOMS
PHOTOPHOBIA: 0
ABDOMINAL PAIN: 0
CHILLS: 0
HEADACHES: 0
MYALGIAS: 0
NAUSEA: 0
EYE REDNESS: 0
SORE THROAT: 0
DIARRHEA: 0
BACK PAIN: 0
DIZZINESS: 0
WHEEZING: 0
STRIDOR: 0
HEARTBURN: 0
NECK PAIN: 0
PALPITATIONS: 0
HEMOPTYSIS: 0
CLAUDICATION: 0
DOUBLE VISION: 0
VOMITING: 0
PND: 0
WEAKNESS: 0
SPUTUM PRODUCTION: 1
COUGH: 1
EYE DISCHARGE: 0
DIAPHORESIS: 0
CONSTIPATION: 0
WEIGHT LOSS: 0
FALLS: 0
SHORTNESS OF BREATH: 0
FEVER: 0
TREMORS: 0
EYE PAIN: 0
FOCAL WEAKNESS: 0
DEPRESSION: 0
SINUS PAIN: 0
SPEECH CHANGE: 0
BLURRED VISION: 0
ORTHOPNEA: 0

## 2019-10-29 NOTE — LETTER
October 29, 2019        Anette Jayleen Yost    To Whom it May Concern,    Ms. Yost was seen in the pulmonary clinic at [facility] on 10/29/2019 for abnormal pulmonary function testing done at work.  Spirometry showed mixed restrictive obstructive pattern.  She did recently undergo treatment for acute bronchitis with antibiotics, steroids and cough medication beginning on October 21.  She has recovered for the most part from her illness however has persistent cough and likely some ongoing inflammation.  Appropriate medication changes were made and I am recommending she be allowed 4 weeks to recover fully from airway inflammation and acute infection at which point we can repeat full pulmonary function testing which will be done at follow-up in my office.  Otherwise she is clear from a pulmonary standpoint to return to work.  Please do not hesitate to call with questions.    Germaine Brito M.D.

## 2019-10-29 NOTE — PROGRESS NOTES
Chief Complaint   Patient presents with   • Establish Care     referral 8/29/19 Vineet TREVINO MD DX COPD, COUGH, SOB    • Results     previous Waldemar 10/21/19 , cxr 10/21/19        HPI: This patient is a 59 y.o. female presenting for evaluation of abnormal spirometry.  The patient's past medical history is significant for degenerative joint disease status post back and knee surgeries, hypertension currently on no medication for this, Graves' disease.  The patient is a current tobacco user roughly 1 pack/day with a 40-pack-year history.  She currently works as a  and is required to have employment physical on an annual basis.  She underwent routine spirometry which demonstrated a mixed restrictive obstructive pattern with an FEV1 estimated at 50% of predicted.  Per report she had mild restriction on spirometry from 2018 but I do not have exact numbers for comparison.  She does tell me that around the time of her spirometry she was suffering from sore throat, body aches, green nasal discharge and cough.  This will had been occurring for 8 days before being seen in urgent care on October 21 at which point she received cough medication, antibiotics and steroids.  Chest x-ray as reviewed by me showed no acute cardiopulmonary abnormality.  She has since completed this medication and symptoms for the most part have improved however she does have mild persistent cough with mild ongoing nasal drainage.  The patient does note episodes of acute bronchitis occurring roughly once per year for which she is typically treated with antibiotics and steroids.  Her last serious infection was in 2015 but she did not require hospitalization.  She was started on Flovent at that time for presumed reactive airways disease versus COPD.  She has been taking this once per day and not on a regular basis.  Her family history is notable for multiple family members with thyroid disorders.  No pulmonary disease.    Past Medical  History:   Diagnosis Date   • Arthritis    • Chronic airway obstruction, not elsewhere classified    • Dental disorder     dentures   • Grave's disease    • Hypertension    • IBS (irritable bowel syndrome)    • Pain     lower back   • Personal history of venous thrombosis and embolism 6/23/2009    right leg       Social History     Socioeconomic History   • Marital status:      Spouse name: Not on file   • Number of children: Not on file   • Years of education: Not on file   • Highest education level: Not on file   Occupational History   • Not on file   Social Needs   • Financial resource strain: Not on file   • Food insecurity:     Worry: Not on file     Inability: Not on file   • Transportation needs:     Medical: Not on file     Non-medical: Not on file   Tobacco Use   • Smoking status: Current Every Day Smoker     Packs/day: 1.00     Years: 30.00     Pack years: 30.00     Types: Cigarettes     Start date: 1979   • Smokeless tobacco: Never Used   Substance and Sexual Activity   • Alcohol use: No   • Drug use: No   • Sexual activity: Not Currently   Lifestyle   • Physical activity:     Days per week: Not on file     Minutes per session: Not on file   • Stress: Not on file   Relationships   • Social connections:     Talks on phone: Not on file     Gets together: Not on file     Attends Oriental orthodox service: Not on file     Active member of club or organization: Not on file     Attends meetings of clubs or organizations: Not on file     Relationship status: Not on file   • Intimate partner violence:     Fear of current or ex partner: Not on file     Emotionally abused: Not on file     Physically abused: Not on file     Forced sexual activity: Not on file   Other Topics Concern   • Not on file   Social History Narrative   • Not on file       Family History   Problem Relation Age of Onset   • Cancer Maternal Grandmother    • Cancer Maternal Aunt        Current Outpatient Medications on File Prior to Visit    Medication Sig Dispense Refill   • FLOVENT  MCG/ACT Aerosol   5   • ondansetron (ZOFRAN ODT) 8 MG TABLET DISPERSIBLE   6   • promethazine (PHENERGAN) 25 MG Tab Take 25 mg by mouth every 6 hours as needed for Nausea/Vomiting.     • CloNIDine HCl 0.1 MG TABLET SR 12 HR Take  by mouth.     • EPINEPHRINE HCL INJ by Injection route.     • benzonatate (TESSALON) 200 MG capsule Take 1 Cap by mouth 3 times a day as needed for Cough. 30 Cap 0   • benzonatate (TESSALON) 200 MG capsule Take 1 Cap by mouth 3 times a day as needed for Cough. 30 Cap 0   • celecoxib (CELEBREX) 200 MG Cap Take 200 mg by mouth 2 times a day.     • fluticasone (FLONASE) 50 MCG/ACT nasal spray Spray 1 Spray in nose every day.     • Albuterol (VENTOLIN INH) Inhale  by mouth.     • nystatin (MYCOSTATIN) 389738 UNIT/ML Suspension Take 500,000 Units by mouth 4 times a day.     • ofloxacin (OCUFLOX) 0.3 % Solution Place 1 Drop in both eyes 4 times a day.     • albuterol (PROVENTIL) 2.5mg/3ml Nebu Soln solution for nebulization 3 mL by Nebulization route every four hours as needed for Shortness of Breath. 75 mL 0   • LOVASTATIN 40 MG PO TABS Take 1 Tab by mouth every day.     • CYCLOBENZAPRINE HCL 10 MG PO TABS Take 1 Tab by mouth 2 times a day.     • HYDROCODONE-ACETAMINOPHEN  MG PO TABS Take 1 Tab by mouth every 6 hours as needed for Mild Pain.     • azithromycin (ZITHROMAX) 250 MG Tab 2 PO day #1 then 1 PO day #2-5 (Patient not taking: Reported on 10/29/2019) 6 Tab 0   • prochlorperazine (COMPAZINE) 10 MG Tab Take 1 Tab by mouth every 6 hours as needed. (Patient not taking: Reported on 10/21/2019) 30 Tab 0   • azithromycin (ZITHROMAX) 250 MG Tab Take as directed on package. 1 pack. (Patient not taking: Reported on 10/21/2019) 6 Tab 0   • MethylPREDNISolone (MEDROL DOSEPAK) 4 MG Tablet Therapy Pack Take as directed on package. 1 pack. (Patient not taking: Reported on 10/21/2019) 21 Tab 0   • diphenoxylate-atropine (LOMOTIL) 2.5-0.025 MG Tab  "  5   • ranitidine (ZANTAC) 150 MG capsule Take  by mouth.     • triamcinolone (KENALOG) 0.1 % lotion Apply to affected area twice a day (Patient not taking: Reported on 10/21/2019) 1 Bottle 0   • doxepin (SINEQUAN) 25 MG Cap Take 25 mg by mouth every evening.     • METHIMAZOLE 5 MG PO TABS Take 0.5 Tabs by mouth every day.     • BENAZEPRIL HCL 20 MG PO TABS Take 1 Tab by mouth every day.       No current facility-administered medications on file prior to visit.        Allergies: Bee; Penicillins; Soap; Tape; and Tetracycline    ROS:   Review of Systems   Constitutional: Negative for chills, diaphoresis, fever, malaise/fatigue and weight loss.   HENT: Negative for congestion, ear discharge, ear pain, hearing loss, nosebleeds, sinus pain, sore throat and tinnitus.    Eyes: Negative for blurred vision, double vision, photophobia, pain, discharge and redness.   Respiratory: Positive for cough and sputum production. Negative for hemoptysis, shortness of breath, wheezing and stridor.    Cardiovascular: Negative for chest pain, palpitations, orthopnea, claudication, leg swelling and PND.   Gastrointestinal: Negative for abdominal pain, constipation, diarrhea, heartburn, nausea and vomiting.   Genitourinary: Negative for dysuria and urgency.   Musculoskeletal: Negative for back pain, falls, joint pain, myalgias and neck pain.   Skin: Negative for itching and rash.   Neurological: Negative for dizziness, tremors, speech change, focal weakness, weakness and headaches.   Endo/Heme/Allergies: Negative for environmental allergies.   Psychiatric/Behavioral: Negative for depression.       /64 (BP Location: Left arm, Patient Position: Sitting, BP Cuff Size: Large adult)   Pulse 89   Temp 36.6 °C (97.9 °F) (Temporal)   Resp 18   Ht 1.702 m (5' 7\")   Wt 108.9 kg (240 lb)   SpO2 98%     Physical Exam:  Physical Exam   Constitutional: She is oriented to person, place, and time. She appears well-developed and " well-nourished.   Moderate obesity   HENT:   Head: Normocephalic and atraumatic.   Right Ear: External ear normal.   Left Ear: External ear normal.   Mouth/Throat: Oropharynx is clear and moist. No oropharyngeal exudate.   Eyes: Pupils are equal, round, and reactive to light. Conjunctivae and EOM are normal. No scleral icterus.   Neck: Normal range of motion. Neck supple. No JVD present. No tracheal deviation present.   Cardiovascular: Normal rate, regular rhythm and normal heart sounds. Exam reveals no gallop and no friction rub.   No murmur heard.  Pulmonary/Chest: Effort normal and breath sounds normal. No accessory muscle usage. No respiratory distress. She has no wheezes. She has no rales.   Abdominal: Soft. She exhibits no distension. There is no tenderness.   Musculoskeletal: Normal range of motion. She exhibits no edema, tenderness or deformity.   Lymphadenopathy:     She has no cervical adenopathy.   Neurological: She is alert and oriented to person, place, and time. No cranial nerve deficit. Gait normal.   Skin: Skin is warm and dry. No rash noted. No cyanosis. Nails show no clubbing.   Psychiatric: She has a normal mood and affect.       PFTs as reviewed by me personally: as per HPI    Imaging as reviewed by me personally: as per HPI    Assessment:  1. Mixed restrictive and obstructive lung disease (HCC)  PULMONARY FUNCTION TESTS -Test requested: Complete Pulmonary Function Test   2. Tobacco use     3. Class 2 obesity with body mass index (BMI) of 37.0 to 37.9 in adult, unspecified obesity type, unspecified whether serious comorbidity present         Plan:  1.  Patient's most recent spirometry shows mild reduction in FVC and moderate reduction in FEV1 with evidence of airflow obstruction consistent with a mixed restrictive obstructive pattern.  I would need full PFTs to better elucidate her pulmonary physiology.  Apparently this is a change from previous when she was noted to have mild restriction likely  related to body habitus.  I am recommending we allow her full 4 weeks to recover from her most recent acute upper respiratory tract infection at which time we will repeat pulmonary function test with lung volumes and DLCO.  Certainly she is at risk for COPD and has been treated with inhaled corticosteroids in the past.  I recommend she continue Flovent but at treatment doses which is 2 puffs twice daily.  She will continue short acting bronchodilators but only on an as-needed basis.  She will follow-up in 4 weeks with PFTs.  2.  Patient was counseled on tobacco cessation.  She understands the risks.  She was offered Chantix as well as tobacco cessation classes however she is not sure Chantix will be covered and attending classes would not be practical for her with distance to drive and work schedule.  We will continue to  on this.  I also offered her referral to the lung cancer screening program as she is a candidate given her age and tobacco history.  She declined at this time.  3.  Patient with moderate obesity.  Certainly this can contribute to shortness of breath.  She would benefit from increased activity even in the absence of weight loss but certainly from healthy weight loss if desirable.  Return in about 4 weeks (around 11/26/2019) for pfts.

## 2019-11-07 ENCOUNTER — OFFICE VISIT (OUTPATIENT)
Dept: URGENT CARE | Facility: PHYSICIAN GROUP | Age: 59
End: 2019-11-07
Payer: COMMERCIAL

## 2019-11-07 VITALS
WEIGHT: 237 LBS | DIASTOLIC BLOOD PRESSURE: 108 MMHG | TEMPERATURE: 98.9 F | RESPIRATION RATE: 18 BRPM | OXYGEN SATURATION: 96 % | SYSTOLIC BLOOD PRESSURE: 172 MMHG | BODY MASS INDEX: 37.12 KG/M2 | HEART RATE: 99 BPM

## 2019-11-07 DIAGNOSIS — J44.1 COPD EXACERBATION (HCC): ICD-10-CM

## 2019-11-07 DIAGNOSIS — J01.01 ACUTE RECURRENT MAXILLARY SINUSITIS: ICD-10-CM

## 2019-11-07 DIAGNOSIS — R05.9 COUGH: ICD-10-CM

## 2019-11-07 PROCEDURE — 99214 OFFICE O/P EST MOD 30 MIN: CPT | Performed by: FAMILY MEDICINE

## 2019-11-07 RX ORDER — DOXYCYCLINE 100 MG/1
CAPSULE ORAL
Refills: 0 | COMMUNITY
Start: 2019-11-05 | End: 2020-05-06

## 2019-11-07 RX ORDER — CLARITHROMYCIN 500 MG/1
500 TABLET, COATED ORAL 2 TIMES DAILY
Qty: 20 TAB | Refills: 0 | Status: SHIPPED | OUTPATIENT
Start: 2019-11-07 | End: 2019-12-18

## 2019-11-07 RX ORDER — BENZONATATE 200 MG/1
200 CAPSULE ORAL 3 TIMES DAILY PRN
Qty: 30 CAP | Refills: 0 | Status: SHIPPED | OUTPATIENT
Start: 2019-11-07 | End: 2019-11-23

## 2019-11-07 RX ORDER — PREDNISONE 20 MG/1
40 TABLET ORAL DAILY
Qty: 10 TAB | Refills: 0 | Status: SHIPPED | OUTPATIENT
Start: 2019-11-07 | End: 2019-11-12

## 2019-11-08 NOTE — PROGRESS NOTES
Subjective:      Anette Yost is a 59 y.o. female who presents with Follow-Up (for sinus congestion,head pressure received medication for it but didnt work)            2 months waxing and waning sinus pressure.  Associated productive cough without blood in sputum.  No fever.  Short of breath and wheezing.  PMH COPD.  She is currently being followed by pulmonology.  She was seen by myself 10/21 and had minimal response to treatment at that time.  No relief with OTC medications.  Symptoms are moderate to severe and progressively worse.  No other aggravating or alleviating factors.      Review of Systems   Constitutional: Negative for malaise/fatigue and weight loss.   Eyes: Negative for discharge and redness.   Gastrointestinal: Negative for nausea and vomiting.   Musculoskeletal: Negative for joint pain and myalgias.   Skin: Negative for itching and rash.     .  Medications, Allergies, and current problem list reviewed today in Epic       Objective:     BP (!) 172/108   Pulse 99   Temp 37.2 °C (98.9 °F)   Resp 18   Wt 107.5 kg (237 lb)   SpO2 96%   BMI 37.12 kg/m²      Physical Exam  Constitutional:       General: She is not in acute distress.     Appearance: She is well-developed.   HENT:      Head: Normocephalic and atraumatic.   Eyes:      Conjunctiva/sclera: Conjunctivae normal.   Cardiovascular:      Rate and Rhythm: Normal rate and regular rhythm.      Heart sounds: Normal heart sounds. No murmur.   Pulmonary:      Effort: Pulmonary effort is normal.      Breath sounds: Wheezing and rhonchi present.   Skin:     General: Skin is warm and dry.      Findings: No rash.   Neurological:      Mental Status: She is alert and oriented to person, place, and time.                 Assessment/Plan:       1. Acute recurrent maxillary sinusitis    - clarithromycin (BIAXIN) 500 MG Tab; Take 1 Tab by mouth 2 times a day.  Dispense: 20 Tab; Refill: 0    2. COPD exacerbation (HCC)    - clarithromycin (BIAXIN) 500  MG Tab; Take 1 Tab by mouth 2 times a day.  Dispense: 20 Tab; Refill: 0  - predniSONE (DELTASONE) 20 MG Tab; Take 2 Tabs by mouth every day for 5 days.  Dispense: 10 Tab; Refill: 0    3. Cough    - benzonatate (TESSALON) 200 MG capsule; Take 1 Cap by mouth 3 times a day as needed for Cough.  Dispense: 30 Cap; Refill: 0      Patient will hold prednisone until BP check after taking her meds  Will hold statin while on biaxin.

## 2019-11-19 ENCOUNTER — HOSPITAL ENCOUNTER (OUTPATIENT)
Dept: LAB | Facility: MEDICAL CENTER | Age: 59
End: 2019-11-19
Attending: PHYSICIAN ASSISTANT
Payer: COMMERCIAL

## 2019-11-19 ENCOUNTER — OFFICE VISIT (OUTPATIENT)
Dept: ENDOCRINOLOGY | Facility: MEDICAL CENTER | Age: 59
End: 2019-11-19
Payer: COMMERCIAL

## 2019-11-19 VITALS
DIASTOLIC BLOOD PRESSURE: 80 MMHG | HEIGHT: 68 IN | BODY MASS INDEX: 35.16 KG/M2 | OXYGEN SATURATION: 94 % | SYSTOLIC BLOOD PRESSURE: 134 MMHG | WEIGHT: 232 LBS | HEART RATE: 92 BPM

## 2019-11-19 DIAGNOSIS — R53.82 CHRONIC FATIGUE: ICD-10-CM

## 2019-11-19 DIAGNOSIS — E05.00 GRAVES DISEASE: ICD-10-CM

## 2019-11-19 DIAGNOSIS — E78.5 DYSLIPIDEMIA: ICD-10-CM

## 2019-11-19 DIAGNOSIS — E05.00 GRAVES' OPHTHALMOPATHY: ICD-10-CM

## 2019-11-19 DIAGNOSIS — E04.2 MULTIPLE THYROID NODULES: ICD-10-CM

## 2019-11-19 DIAGNOSIS — Z84.89 FAMILY HISTORY OF BENIGN PARATHYROID TUMOR: ICD-10-CM

## 2019-11-19 DIAGNOSIS — E05.90 HYPERTHYROIDISM: ICD-10-CM

## 2019-11-19 DIAGNOSIS — E55.9 VITAMIN D DEFICIENCY: ICD-10-CM

## 2019-11-19 LAB
25(OH)D3 SERPL-MCNC: 7 NG/ML (ref 30–100)
CA-I SERPL-SCNC: 1.3 MMOL/L (ref 1.1–1.3)
T4 FREE SERPL-MCNC: 0.8 NG/DL (ref 0.53–1.43)
TSH SERPL DL<=0.005 MIU/L-ACNC: 2.54 UIU/ML (ref 0.38–5.33)

## 2019-11-19 PROCEDURE — 83970 ASSAY OF PARATHORMONE: CPT

## 2019-11-19 PROCEDURE — 86376 MICROSOMAL ANTIBODY EACH: CPT

## 2019-11-19 PROCEDURE — 36415 COLL VENOUS BLD VENIPUNCTURE: CPT

## 2019-11-19 PROCEDURE — 84439 ASSAY OF FREE THYROXINE: CPT

## 2019-11-19 PROCEDURE — 82306 VITAMIN D 25 HYDROXY: CPT

## 2019-11-19 PROCEDURE — 99204 OFFICE O/P NEW MOD 45 MIN: CPT | Performed by: PHYSICIAN ASSISTANT

## 2019-11-19 PROCEDURE — 82330 ASSAY OF CALCIUM: CPT

## 2019-11-19 PROCEDURE — 84480 ASSAY TRIIODOTHYRONINE (T3): CPT

## 2019-11-19 PROCEDURE — 82024 ASSAY OF ACTH: CPT

## 2019-11-19 PROCEDURE — 84481 FREE ASSAY (FT-3): CPT

## 2019-11-19 PROCEDURE — 84443 ASSAY THYROID STIM HORMONE: CPT

## 2019-11-19 PROCEDURE — 82607 VITAMIN B-12: CPT

## 2019-11-19 RX ORDER — PHENOL 1.4 %
10 AEROSOL, SPRAY (ML) MUCOUS MEMBRANE
COMMUNITY
End: 2022-12-05

## 2019-11-19 RX ORDER — DICYCLOMINE HCL 20 MG
20 TABLET ORAL EVERY 8 HOURS PRN
COMMUNITY

## 2019-11-19 RX ORDER — ASPIRIN 81 MG/1
81 TABLET, CHEWABLE ORAL DAILY
COMMUNITY

## 2019-11-19 RX ORDER — SUMATRIPTAN 100 MG/1
1 TABLET, FILM COATED ORAL
Refills: 1 | COMMUNITY
Start: 2019-08-30 | End: 2020-08-09

## 2019-11-19 RX ORDER — KRILL/OM-3/DHA/EPA/PHOSPHO/AST 500MG-86MG
1 CAPSULE ORAL DAILY
COMMUNITY
End: 2020-08-09

## 2019-11-19 RX ORDER — LOPERAMIDE HYDROCHLORIDE 2 MG/1
2 CAPSULE ORAL 4 TIMES DAILY PRN
COMMUNITY
End: 2020-10-01

## 2019-11-19 ASSESSMENT — ENCOUNTER SYMPTOMS
EYE REDNESS: 0
EYE DISCHARGE: 0
WEIGHT LOSS: 0
NAUSEA: 0
VOMITING: 0
MYALGIAS: 0

## 2019-11-19 NOTE — PROGRESS NOTES
New Patient Consult Note  Referred by: Duane Gonzales P.A.-C.    HPI:  Anette Yost is a  59 y.o. old patient who comes in today for evaluation and management of the followin. Graves:   Dx  - Patient was maintained on Methimazole.   Methimazole 2.5 mg tablets - discontinued 2019- by PCP     Patiritan has been sick with URI/Sinus. Patient was previously on ABX Last dose 3 days ago.    Intermittent symptoms palpations   Patient has a lot of stress at home, son and mother with cancer.   Patient recently put on worksman comp     3/18/19 TSH 4.25, FT3 2.9, FT4 0.63  2018 TSH 5.65, FT4 0.86  2018 FT4 0.78, TSH 7.8  2018 TSH 5.89  2017 TSH 4.  2017 TSH 7.69  2017 TSH 4.16    Thyroid ultrasound: -       NM Thyroid uptake scan:   05/10/2007.   FINDINGS:     Planar images of the thyroid gland reveal mildly enlarged   thyroid gland.  Distribution of activity is fairly homogeneous.  No focal   photopenic or hyperactive foci identified.    Five-hour uptake measured at 15.7% and 24-hour uptake measured at 30.5%.    2. Graves opthalmopathy:   + dry eyes. Previously with Dr. De Jesus.   + no loss of peripheral vision   + glasses   Denies blurred vision/  + Dr. Eckert (2-3 years)     3. Dyslipidemia  currentlly on Lovastatin   Krill oil   Patient is compliant with medications   Denies any current side effects     4. Hypertension:   DX:    Benazepril 40 mg tablet - Discontinued by PCP  Rx: Clonidine for hypertension   Patient was previously having issues with low blood pressure     5.  Vitamin D deficiency   2012 vitamin D 24    6. Impaired fasting glucose:   3/17-  Glucose 111      ROS:  Constitutional: No weight loss or gain,  fever  HEENT: No difficulty with swallowing, change in voice, or swelling in throat area   Cardiac: No chest pain, palpitations, or racing heart  Resp: No shortness of breath  GI: No abdominal pain, nausea, vomiting, or diarrhea   Neuro: No  numbness or tinging in feet  Endo: No heat or cold intolerance, no polyuria or polydipsia  All other systems were reviewed and were negative.    Past Medical History:  There are no active problems to display for this patient.      Past Surgical History:  Past Surgical History:   Procedure Laterality Date   • ACL RECONSTRUCTION SCOPE  7/9/2009    Performed by RENEE LEVI at [facility]   • LUMBAR FUSION POSTERIOR  2004   • PB DISKECTOMY,PERCUTANEOUS LUMBAR  2001   • HYSTERECTOMY, VAGINAL  1999   • TUBAL LIGATION  1994   • TONSILLECTOMY  1968       Allergies:  Bee; Diphenoxylate; Penicillins; Soap; Tape; and Tetracycline    Social History:  Social History     Socioeconomic History   • Marital status:      Spouse name: Not on file   • Number of children: Not on file   • Years of education: Not on file   • Highest education level: Not on file   Occupational History   • Not on file   Social Needs   • Financial resource strain: Not on file   • Food insecurity:     Worry: Not on file     Inability: Not on file   • Transportation needs:     Medical: Not on file     Non-medical: Not on file   Tobacco Use   • Smoking status: Current Every Day Smoker     Packs/day: 1.00     Years: 30.00     Pack years: 30.00     Types: Cigarettes     Start date: 1979   • Smokeless tobacco: Never Used   Substance and Sexual Activity   • Alcohol use: No   • Drug use: No   • Sexual activity: Not Currently   Lifestyle   • Physical activity:     Days per week: Not on file     Minutes per session: Not on file   • Stress: Not on file   Relationships   • Social connections:     Talks on phone: Not on file     Gets together: Not on file     Attends Shinto service: Not on file     Active member of club or organization: Not on file     Attends meetings of clubs or organizations: Not on file     Relationship status: Not on file   • Intimate partner violence:     Fear of current or ex partner: Not on file     Emotionally  abused: Not on file     Physically abused: Not on file     Forced sexual activity: Not on file   Other Topics Concern   • Not on file   Social History Narrative   • Not on file       Family History:  Family History   Problem Relation Age of Onset   • Cancer Maternal Grandmother         breast    • Cancer Maternal Aunt    • Cancer Mother         mm    • Cancer Father         colon cancer    • Hypertension Sister    • Diabetes Sister    • Cancer Maternal Grandfather         barretts/lung    • Cancer Paternal Grandmother    • Lung Disease Paternal Grandfather    • Cancer Paternal Grandfather         lung   • Cancer Other         hodkins (remission)    • Hypertension Brother    • Diabetes Brother        Medications:    Current Outpatient Medications:   •  sumatriptan (IMITREX) 100 MG tablet, Take 1 Tab by mouth Once PRN., Disp: , Rfl: 1  •  dicyclomine (BENTYL) 20 MG Tab, Take 20 mg by mouth every 8 hours as needed., Disp: , Rfl:   •  riFAXIMin (XIFAXAN) 550 MG Tab tablet, Take 550 mg by mouth 2 Times a Day., Disp: , Rfl:   •  Krill Oil 500 MG Cap, Take 1 Cap by mouth every day., Disp: , Rfl:   •  aspirin (ASA) 81 MG Chew Tab chewable tablet, Take 81 mg by mouth every day., Disp: , Rfl:   •  Melatonin 10 MG Tab, Take 1-2 Tabs by mouth every bedtime., Disp: , Rfl:   •  aspirin effervescent (ALLYSSA-SELTZER) 325 MG Effer Tab, Take 2 Tabs by mouth every four hours as needed., Disp: , Rfl:   •  Throat Lozenges (COUGH DROPS MT), Spray  in mouth/throat., Disp: , Rfl:   •  loperamide (IMODIUM) 2 MG Cap, Take 2 mg by mouth 4 times a day as needed for Diarrhea., Disp: , Rfl:   •  Aspirin-Acetaminophen-Caffeine (EXCEDRIN MIGRAINE PO), Take 1-2 Tabs by mouth Once PRN., Disp: , Rfl:   •  FLOVENT  MCG/ACT Aerosol, , Disp: , Rfl: 5  •  ondansetron (ZOFRAN ODT) 8 MG TABLET DISPERSIBLE, Take 8 mg by mouth every 6 hours as needed., Disp: , Rfl: 6  •  promethazine (PHENERGAN) 25 MG Tab, Take 25 mg by mouth every 6 hours as needed for  Nausea/Vomiting., Disp: , Rfl:   •  CloNIDine HCl 0.1 MG TABLET SR 12 HR, Take  by mouth., Disp: , Rfl:   •  EPINEPHRINE HCL INJ, by Injection route., Disp: , Rfl:   •  celecoxib (CELEBREX) 200 MG Cap, Take 200 mg by mouth every day., Disp: , Rfl:   •  fluticasone (FLONASE) 50 MCG/ACT nasal spray, Spray 1 Spray in nose every day., Disp: , Rfl:   •  nystatin (MYCOSTATIN) 507312 UNIT/ML Suspension, Take 500,000 Units by mouth 4 times a day., Disp: , Rfl:   •  albuterol (PROVENTIL) 2.5mg/3ml Nebu Soln solution for nebulization, 3 mL by Nebulization route every four hours as needed for Shortness of Breath., Disp: 75 mL, Rfl: 0  •  LOVASTATIN 40 MG PO TABS, Take 1 Tab by mouth every day., Disp: , Rfl:   •  CYCLOBENZAPRINE HCL 10 MG PO TABS, Take 1 Tab by mouth every 8 hours as needed., Disp: , Rfl:   •  HYDROCODONE-ACETAMINOPHEN  MG PO TABS, Take 1 Tab by mouth every 8 hours as needed., Disp: , Rfl:   •  Respiratory Therapy Supplies (PULMONEB LT) Device, , Disp: , Rfl: 0  •  VENTOLIN  (90 Base) MCG/ACT Aero Soln inhalation aerosol, , Disp: , Rfl: 5  •  clarithromycin (BIAXIN) 500 MG Tab, Take 1 Tab by mouth 2 times a day., Disp: 20 Tab, Rfl: 0  •  albuterol (PROVENTIL) 2.5mg/3ml Nebu Soln solution for nebulization, 3 mL by Nebulization route every four hours as needed for Shortness of Breath., Disp: 30 Bullet, Rfl: 0  •  nystatin (MYCOSTATIN) 139650 UNIT/ML Suspension, Take 5 mL by mouth 4 times a day for 10 days., Disp: 200 mL, Rfl: 0  •  predniSONE (DELTASONE) 10 MG Tab, Take 4 Tabs by mouth every day for 5 days., Disp: 20 Tab, Rfl: 0  •  benzonatate (TESSALON) 100 MG Cap, Take 1 Cap by mouth 3 times a day as needed for Cough., Disp: 60 Cap, Rfl: 0  •  doxycycline (MONODOX) 100 MG capsule, , Disp: , Rfl: 0  •  clarithromycin (BIAXIN) 500 MG Tab, Take 1 Tab by mouth 2 times a day. (Patient not taking: Reported on 11/19/2019), Disp: 20 Tab, Rfl: 0  •  azithromycin (ZITHROMAX) 250 MG Tab, 2 PO day #1 then 1  "PO day #2-5 (Patient not taking: Reported on 10/29/2019), Disp: 6 Tab, Rfl: 0  •  prochlorperazine (COMPAZINE) 10 MG Tab, Take 1 Tab by mouth every 6 hours as needed. (Patient not taking: Reported on 10/21/2019), Disp: 30 Tab, Rfl: 0  •  azithromycin (ZITHROMAX) 250 MG Tab, Take as directed on package. 1 pack. (Patient not taking: Reported on 10/21/2019), Disp: 6 Tab, Rfl: 0  •  MethylPREDNISolone (MEDROL DOSEPAK) 4 MG Tablet Therapy Pack, Take as directed on package. 1 pack. (Patient not taking: Reported on 10/21/2019), Disp: 21 Tab, Rfl: 0  •  diphenoxylate-atropine (LOMOTIL) 2.5-0.025 MG Tab, , Disp: , Rfl: 5  •  ranitidine (ZANTAC) 150 MG capsule, Take  by mouth., Disp: , Rfl:   •  triamcinolone (KENALOG) 0.1 % lotion, Apply to affected area twice a day (Patient not taking: Reported on 10/21/2019), Disp: 1 Bottle, Rfl: 0  •  Albuterol (VENTOLIN INH), Inhale  by mouth., Disp: , Rfl:   •  ofloxacin (OCUFLOX) 0.3 % Solution, Place 1 Drop in both eyes 4 times a day., Disp: , Rfl:   •  doxepin (SINEQUAN) 25 MG Cap, Take 25 mg by mouth every evening., Disp: , Rfl:   •  METHIMAZOLE 5 MG PO TABS, Take 0.5 Tabs by mouth every day., Disp: , Rfl:   •  BENAZEPRIL HCL 20 MG PO TABS, Take 1 Tab by mouth every day., Disp: , Rfl:     Labs: Reviewed (as above)     Physical Examination:  Vital signs: /80 (BP Location: Left arm, Patient Position: Sitting, BP Cuff Size: Adult)   Pulse 92   Ht 1.727 m (5' 8\")   Wt 105.2 kg (232 lb)   SpO2 94%   BMI 35.28 kg/m²  Body mass index is 35.28 kg/m².  General: No apparent distress, cooperative  Eyes: No scleral icterus or discharge, no nystagmus  ENMT: Normal on external inspection of nose, lips, boggy thyroid exam  Neck: No abnormal masses on inspection or palpations. no bruits noted   Resp: Normal effort, clear to auscultation bilaterally without wheezes, rales or rhonchi  CVS: Regular rate and rhythm, S1 S2 normal, no murmur, rubs or gallops    Extremities: No edema. "   Abdomen: abdominal obesity, NTND  Neuro: Alert and oriented  Skin: No rash  Psych: Normal mood and affect, intact memory and able to make informed decisions    Assessment and Plan:  1. Graves disease  Patient was discontinued off Methimazole by PCP.   Will repeat below labs   - FREE THYROXINE; Future  - T3 FREE; Future  - TSH; Future  - TRIIDOTHYRONINE; Future  - THYROTROPIN RECEP AB; Future  - THYROID PEROXIDASE  (TPO) AB; Future  - Comp Metabolic Panel    2. Graves' ophthalmopathy  Per Dr. De Jesus    3. Hyperthyroidism  As per above   - Comp Metabolic Panel    4. Dyslipidemia  - Comp Metabolic Panel    5. Chronic fatigue  Rule out Vitamin B12 and Vitamin D deficiency as the contributory/etiology for fatigue. Will also check for adrenal function   - VITAMIN D,25 HYDROXY; Future  - VITAMIN B12; Future  - ACTH; Future  - CORTISOL; Future    6. Multiple thyroid nodules  Secondary to history will check Thyroid ultrasound  - US-SOFT TISSUES OF HEAD - NECK; Future    7. Family history of benign parathyroid tumor  - PTH WITH CALCIUM; Future  - IONIZED CALCIUM; Future  - Comp Metabolic Panel      Return in about 4 weeks (around 12/17/2019). Patient would be prefer to follow up after January 1     Thank you for allowing me to participate in the care of this patient.  If you have any questions or concerns please do not hesitate to contact me.    Lucy Velázquez P.A.-C.  11/19/19    CC:   Duane Gonzales P.A.-C.    This note was created using voice recognition software (Dragon). The accuracy of the dictation is limited by the abilities of the software. I have reviewed the note prior to signing, however some errors in grammar and context are still possible. If you have any questions related to this note please do not hesitate to contact our office.

## 2019-11-20 LAB
CALCIUM SERPL-MCNC: 9.9 MG/DL (ref 8.5–10.5)
PTH-INTACT SERPL-MCNC: 38 PG/ML (ref 14–72)
T3 SERPL-MCNC: 108.2 NG/DL (ref 60–181)
T3FREE SERPL-MCNC: 3.21 PG/ML (ref 2.4–4.2)
THYROPEROXIDASE AB SERPL-ACNC: 3.7 IU/ML (ref 0–9)
VIT B12 SERPL-MCNC: 258 PG/ML (ref 211–911)

## 2019-11-21 LAB — ACTH PLAS-MCNC: 5.9 PG/ML (ref 7.2–63.3)

## 2019-11-23 ENCOUNTER — OFFICE VISIT (OUTPATIENT)
Dept: URGENT CARE | Facility: CLINIC | Age: 59
End: 2019-11-23
Payer: COMMERCIAL

## 2019-11-23 VITALS
BODY MASS INDEX: 35.16 KG/M2 | HEIGHT: 68 IN | DIASTOLIC BLOOD PRESSURE: 76 MMHG | TEMPERATURE: 97.9 F | SYSTOLIC BLOOD PRESSURE: 128 MMHG | WEIGHT: 232 LBS | OXYGEN SATURATION: 96 % | RESPIRATION RATE: 16 BRPM | HEART RATE: 93 BPM

## 2019-11-23 DIAGNOSIS — B37.0 THRUSH: ICD-10-CM

## 2019-11-23 DIAGNOSIS — J44.1 COPD EXACERBATION (HCC): ICD-10-CM

## 2019-11-23 DIAGNOSIS — J98.8 RTI (RESPIRATORY TRACT INFECTION): ICD-10-CM

## 2019-11-23 DIAGNOSIS — J01.00 ACUTE NON-RECURRENT MAXILLARY SINUSITIS: ICD-10-CM

## 2019-11-23 PROCEDURE — 99214 OFFICE O/P EST MOD 30 MIN: CPT | Performed by: PHYSICIAN ASSISTANT

## 2019-11-23 RX ORDER — ALBUTEROL SULFATE 2.5 MG/3ML
2.5 SOLUTION RESPIRATORY (INHALATION) EVERY 4 HOURS PRN
Qty: 30 BULLET | Refills: 0 | Status: SHIPPED | OUTPATIENT
Start: 2019-11-23 | End: 2019-12-18

## 2019-11-23 RX ORDER — CLARITHROMYCIN 500 MG/1
500 TABLET, COATED ORAL 2 TIMES DAILY
Qty: 20 TAB | Refills: 0 | Status: SHIPPED | OUTPATIENT
Start: 2019-11-23 | End: 2019-12-18

## 2019-11-23 RX ORDER — ALBUTEROL SULFATE 90 UG/1
2 AEROSOL, METERED RESPIRATORY (INHALATION) EVERY 4 HOURS PRN
Refills: 5 | COMMUNITY
Start: 2019-09-23

## 2019-11-23 RX ORDER — PREDNISONE 10 MG/1
40 TABLET ORAL DAILY
Qty: 20 TAB | Refills: 0 | Status: SHIPPED | OUTPATIENT
Start: 2019-11-23 | End: 2019-11-28

## 2019-11-23 RX ORDER — BENZONATATE 100 MG/1
100 CAPSULE ORAL 3 TIMES DAILY PRN
Qty: 60 CAP | Refills: 0 | Status: SHIPPED | OUTPATIENT
Start: 2019-11-23 | End: 2020-08-09

## 2019-11-23 ASSESSMENT — ENCOUNTER SYMPTOMS
COUGH: 1
EYE DISCHARGE: 0
SINUS PAIN: 1
FEVER: 0
SORE THROAT: 1
CHILLS: 0
EYE PAIN: 0
SHORTNESS OF BREATH: 1
EYE REDNESS: 0
SPUTUM PRODUCTION: 1
WHEEZING: 1

## 2019-11-23 ASSESSMENT — COPD QUESTIONNAIRES: COPD: 1

## 2019-11-24 PROBLEM — E05.90 HYPERTHYROIDISM: Status: ACTIVE | Noted: 2019-11-24

## 2019-11-24 PROBLEM — E55.9 VITAMIN D DEFICIENCY: Status: ACTIVE | Noted: 2019-11-24

## 2019-11-24 PROBLEM — Z84.89: Status: ACTIVE | Noted: 2019-11-24

## 2019-11-24 PROBLEM — E78.5 DYSLIPIDEMIA: Status: ACTIVE | Noted: 2019-11-24

## 2019-11-24 PROBLEM — E05.00 GRAVES DISEASE: Status: ACTIVE | Noted: 2019-11-24

## 2019-11-24 PROBLEM — E05.00 GRAVES' OPHTHALMOPATHY: Status: ACTIVE | Noted: 2019-11-24

## 2019-11-24 PROBLEM — E04.2 MULTIPLE THYROID NODULES: Status: ACTIVE | Noted: 2019-11-24

## 2019-11-24 RX ORDER — ERGOCALCIFEROL 1.25 MG/1
50000 CAPSULE ORAL
Qty: 4 CAP | Refills: 2 | Status: SHIPPED | OUTPATIENT
Start: 2019-11-24 | End: 2020-05-06 | Stop reason: SDUPTHER

## 2019-11-25 ENCOUNTER — TELEPHONE (OUTPATIENT)
Dept: ENDOCRINOLOGY | Facility: MEDICAL CENTER | Age: 59
End: 2019-11-25

## 2019-11-26 NOTE — TELEPHONE ENCOUNTER
----- Message from Lucy Velázquez P.A.-C. sent at 11/24/2019  3:17 PM PST -----  Please contact patient   Reviewed labs-     Vitamin D is low- start on Vitamin D supplementation once weekly - called into pharmacy     Thyroid- she does not currently need methimazole. We will continue to monitor.     She will need Thyroid labs prior to coming into the office in January with the Endocrinologist

## 2019-12-04 ENCOUNTER — TELEPHONE (OUTPATIENT)
Dept: PULMONOLOGY | Facility: HOSPICE | Age: 59
End: 2019-12-04

## 2019-12-05 NOTE — TELEPHONE ENCOUNTER
Germaine Brito M.D.  You 16 hours ago (4:40 PM)      I would prefer to see the patient particularly since she just finished antibiotics.  She should at a minimum have a chest X-ray which I am happy to order prior to her appointment.

## 2019-12-05 NOTE — TELEPHONE ENCOUNTER
Pt called regarding OV w/ PFT on 12/9/19 with Dr Brito. She states she still feels horrible and that she does not think she can do the PFT prior to Apt. She would like an ABX faxed to [facility] pharmacy. She complains of Sore throat, cough with Green, productive Sputum. She just finished a Zpak 5 days ago and says it was not strong enough.   We can get a hold of her at the [facility] shelter. Her Sgt # is [phone] and ask for officer Priyank . Please advise

## 2019-12-09 ENCOUNTER — NON-PROVIDER VISIT (OUTPATIENT)
Dept: PULMONOLOGY | Facility: HOSPICE | Age: 59
End: 2019-12-09
Attending: INTERNAL MEDICINE
Payer: COMMERCIAL

## 2019-12-09 ENCOUNTER — OFFICE VISIT (OUTPATIENT)
Dept: PULMONOLOGY | Facility: HOSPICE | Age: 59
End: 2019-12-09
Payer: COMMERCIAL

## 2019-12-09 VITALS — WEIGHT: 235 LBS | HEIGHT: 67 IN | BODY MASS INDEX: 36.88 KG/M2

## 2019-12-09 VITALS
SYSTOLIC BLOOD PRESSURE: 124 MMHG | WEIGHT: 235 LBS | DIASTOLIC BLOOD PRESSURE: 68 MMHG | OXYGEN SATURATION: 97 % | TEMPERATURE: 97.3 F | RESPIRATION RATE: 16 BRPM | BODY MASS INDEX: 36.88 KG/M2 | HEART RATE: 50 BPM | HEIGHT: 67 IN

## 2019-12-09 DIAGNOSIS — J44.9 CHRONIC OBSTRUCTIVE PULMONARY DISEASE, UNSPECIFIED COPD TYPE (HCC): ICD-10-CM

## 2019-12-09 DIAGNOSIS — J32.9 CHRONIC SINUSITIS, UNSPECIFIED LOCATION: ICD-10-CM

## 2019-12-09 DIAGNOSIS — Z72.0 TOBACCO USE: ICD-10-CM

## 2019-12-09 DIAGNOSIS — R06.02 SOB (SHORTNESS OF BREATH): ICD-10-CM

## 2019-12-09 PROCEDURE — 94726 PLETHYSMOGRAPHY LUNG VOLUMES: CPT | Performed by: INTERNAL MEDICINE

## 2019-12-09 PROCEDURE — 94729 DIFFUSING CAPACITY: CPT | Performed by: INTERNAL MEDICINE

## 2019-12-09 PROCEDURE — 94060 EVALUATION OF WHEEZING: CPT | Performed by: INTERNAL MEDICINE

## 2019-12-09 PROCEDURE — 99214 OFFICE O/P EST MOD 30 MIN: CPT | Performed by: INTERNAL MEDICINE

## 2019-12-09 RX ORDER — MONTELUKAST SODIUM 10 MG/1
10 TABLET ORAL EVERY EVENING
Qty: 30 TAB | Refills: 11 | Status: SHIPPED | OUTPATIENT
Start: 2019-12-09 | End: 2019-12-09 | Stop reason: SDUPTHER

## 2019-12-09 RX ORDER — MONTELUKAST SODIUM 10 MG/1
10 TABLET ORAL EVERY EVENING
Qty: 30 TAB | Refills: 11 | Status: SHIPPED | OUTPATIENT
Start: 2019-12-09 | End: 2020-05-06

## 2019-12-09 ASSESSMENT — ENCOUNTER SYMPTOMS
DEPRESSION: 0
SINUS PAIN: 0
DIARRHEA: 0
NAUSEA: 0
HEADACHES: 0
FALLS: 0
FEVER: 0
PND: 0
ABDOMINAL PAIN: 0
BACK PAIN: 0
CONSTIPATION: 0
PHOTOPHOBIA: 0
WHEEZING: 0
TREMORS: 0
CLAUDICATION: 0
DIAPHORESIS: 0
SHORTNESS OF BREATH: 1
NECK PAIN: 0
ORTHOPNEA: 0
FOCAL WEAKNESS: 0
STRIDOR: 0
HEARTBURN: 0
DIZZINESS: 0
EYE PAIN: 0
PALPITATIONS: 0
WEIGHT LOSS: 0
VOMITING: 0
COUGH: 1
MYALGIAS: 0
WEAKNESS: 0
DOUBLE VISION: 0
SPUTUM PRODUCTION: 1
HEMOPTYSIS: 0
EYE DISCHARGE: 0
CHILLS: 0
SORE THROAT: 0
BLURRED VISION: 0
EYE REDNESS: 0
SPEECH CHANGE: 0

## 2019-12-09 ASSESSMENT — PULMONARY FUNCTION TESTS
FEV1_PREDICTED: 2.93
FEV1/FVC_PERCENT_PREDICTED: 71
FEV1: 1.32
FEV1/FVC_PERCENT_LLN: 66
FVC_PREDICTED: 3.76
FVC: 2.48
FEV1/FVC_PERCENT_CHANGE: 2
FEV1/FVC_PERCENT_CHANGE: 150
FEV1_LLN: 2.44
FEV1_PERCENT_PREDICTED: 45
FEV1_PERCENT_PREDICTED: 49
FEV1/FVC_PREDICTED: 79
FEV1/FVC: 58.06
FVC_PERCENT_PREDICTED: 65
FEV1/FVC_PERCENT_PREDICTED: 78
FEV1/FVC_PERCENT_PREDICTED: 75
FEV1/FVC_PERCENT_PREDICTED: 74
FVC_LLN: 3.14
FEV1/FVC: 57
FEV1_PERCENT_CHANGE: 9
FEV1/FVC: 58
FEV1: 1.44
FVC_PERCENT_PREDICTED: 61
FEV1/FVC_PERCENT_PREDICTED: 73
FEV1_PERCENT_CHANGE: 6
FVC: 2.33
FEV1/FVC: 57

## 2019-12-09 NOTE — LETTER
December 9, 2019        Anette Yost    To whom it may concern;    Anette Yost is a patient in the pulmonary clinic with [facility] where she is being treated for chronic obstructive airways disease.  She was seen by me on October 29 and again on December 9.  We made medication changes on December 9 in an effort to improve her pulmonary function which was tested via pulmonary function tests on December 9 and demonstrated moderate obstructive airways disease.  We plan to see her back in 3 months to reassess her symptoms with hopes that changes in therapy can improve but not likely normalize her lung function.  She was counseled on tobacco cessation which is paramount in the treatment of her disease.  Please do not hesitate to contact myself with questions.        Germaine Brito M.D.

## 2019-12-09 NOTE — PATIENT INSTRUCTIONS
mucinex (guaifenisin) regular strength twice daily with breathing treatments to help mucous clearance    Consider taking over-the-counter antihistamine such as claritin, zyrtec or allegra (generic - store brand is fine, no decongestant) daily     STOP FLOVENT; start trelegy  Resume monteleukast (singulair)

## 2019-12-09 NOTE — PROCEDURES
Technician: BELINDA Hu    Technician Comment:  Good patient effort & cooperation.  The results of this test meet the ATS/ERS standards for acceptability & reproducibility.  Test was performed on the [employer] Body Plethysmograph-Elite DX system.  Predicted values were [facility]-3 for spirometry, [facility] for DLCO, ITS for Lung Volumes.  The DLCO was uncorrected for Hgb.  A bronchodilator of Ventolin HFA -2puffs via spacer administered.  DLCO performed during dilation period.    Interpretation:  1.  Baseline spirometry shows moderate airflow obstruction with FEV1 of 1.32 L or 45% predicted and FEV1/FVC ratio of 57.  2.  There is trend toward bronchodilator response but does not meet strict criteria.  3.  Lung volumes are upper limit of normal with total lung capacity of 118% predicted.  There is evidence of moderate air trapping.  4.  DLCO is preserved at 86% predicted.  Pulmonary function tests are consistent with moderate COPD.  Suggest clinical correlation.

## 2019-12-09 NOTE — PROGRESS NOTES
Chief Complaint   Patient presents with   • Follow-Up     Last seen 10/29/19    • Results     PFT60 12/9/19         HPI: This patient is a 59 y.o. female whom is followed in our clinic for abnormal spirometry last seen by me on 10/29/19. The patient's past medical history is significant for degenerative joint disease status post back and knee surgeries, hypertension currently on no medication for this, Graves' disease.  The patient is a current tobacco user roughly 1 pack/day with a 40-pack-year history.  She currently works as a  and is required to have employment physical on an annual basis.  She underwent routine spirometry which demonstrated a mixed restrictive obstructive pattern with an FEV1 estimated at 50% of predicted and was referred to me for further evaluation.  At the time of our initial consult the patient had a chest x-ray which showed no acute cardiopulmonary abnormality but she had been treated with antibiotics and steroids for an acute episode of bronchitis.  She was having ongoing cough with nasal drainage and we opted to allow her 4 weeks minimum time to recover.  She presents today for follow-up with pulmonary function tests complaining of persistent cough productive of green sputum.  She has been treated at least twice since our last appointment with antibiotics.  She reports her nasal congestion has improved but she continues to have intermittent cough productive of green sputum.  She has not had any repeat chest imaging since October 21.  She is currently taking Flovent 2 puffs twice daily for presumed reactive airways disease and is sleeping with the head of her bed elevated for chronic gastroesophageal reflux disease.  She continues to smoke 1/2 pack/day.  Her pulmonary function test today show moderate COPD with an FEV1 of 1.32 L or 45% predicted and an FEV1/FVC ratio 57.  There is evidence of significant air trapping and mild hyperinflation but DLCO is preserved at 86%  predicted.    Past Medical History:   Diagnosis Date   • Arthritis    • Chronic airway obstruction, not elsewhere classified    • Dental disorder     dentures   • Grave's disease    • Hypertension    • IBS (irritable bowel syndrome)    • Pain     lower back   • Personal history of venous thrombosis and embolism 6/23/2009    right leg       Social History     Socioeconomic History   • Marital status:      Spouse name: Not on file   • Number of children: Not on file   • Years of education: Not on file   • Highest education level: Not on file   Occupational History   • Not on file   Social Needs   • Financial resource strain: Not on file   • Food insecurity:     Worry: Not on file     Inability: Not on file   • Transportation needs:     Medical: Not on file     Non-medical: Not on file   Tobacco Use   • Smoking status: Current Every Day Smoker     Packs/day: 1.00     Years: 30.00     Pack years: 30.00     Types: Cigarettes     Start date: 1979   • Smokeless tobacco: Never Used   Substance and Sexual Activity   • Alcohol use: No   • Drug use: No   • Sexual activity: Not Currently   Lifestyle   • Physical activity:     Days per week: Not on file     Minutes per session: Not on file   • Stress: Not on file   Relationships   • Social connections:     Talks on phone: Not on file     Gets together: Not on file     Attends Anabaptist service: Not on file     Active member of club or organization: Not on file     Attends meetings of clubs or organizations: Not on file     Relationship status: Not on file   • Intimate partner violence:     Fear of current or ex partner: Not on file     Emotionally abused: Not on file     Physically abused: Not on file     Forced sexual activity: Not on file   Other Topics Concern   • Not on file   Social History Narrative   • Not on file       Family History   Problem Relation Age of Onset   • Cancer Maternal Grandmother         breast    • Cancer Maternal Aunt    • Cancer Mother          mm    • Cancer Father         colon cancer    • Hypertension Sister    • Diabetes Sister    • Cancer Maternal Grandfather         barretts/lung    • Cancer Paternal Grandmother    • Lung Disease Paternal Grandfather    • Cancer Paternal Grandfather         lung   • Cancer Other         hodkins (remission)    • Hypertension Brother    • Diabetes Brother        Current Outpatient Medications on File Prior to Visit   Medication Sig Dispense Refill   • vitamin D, Ergocalciferol, (DRISDOL) 64562 units Cap capsule Take 1 Cap by mouth every 7 days. 4 Cap 2   • Respiratory Therapy Supplies (PULMONEB LT) Device   0   • VENTOLIN  (90 Base) MCG/ACT Aero Soln inhalation aerosol   5   • benzonatate (TESSALON) 100 MG Cap Take 1 Cap by mouth 3 times a day as needed for Cough. 60 Cap 0   • sumatriptan (IMITREX) 100 MG tablet Take 1 Tab by mouth Once PRN.  1   • dicyclomine (BENTYL) 20 MG Tab Take 20 mg by mouth every 8 hours as needed.     • riFAXIMin (XIFAXAN) 550 MG Tab tablet Take 550 mg by mouth 2 Times a Day.     • Krill Oil 500 MG Cap Take 1 Cap by mouth every day.     • aspirin (ASA) 81 MG Chew Tab chewable tablet Take 81 mg by mouth every day.     • Melatonin 10 MG Tab Take 1-2 Tabs by mouth every bedtime.     • aspirin effervescent (ALLYSSA-SELTZER) 325 MG Effer Tab Take 2 Tabs by mouth every four hours as needed.     • Throat Lozenges (COUGH DROPS MT) Spray  in mouth/throat.     • loperamide (IMODIUM) 2 MG Cap Take 2 mg by mouth 4 times a day as needed for Diarrhea.     • Aspirin-Acetaminophen-Caffeine (EXCEDRIN MIGRAINE PO) Take 1-2 Tabs by mouth Once PRN.     • ondansetron (ZOFRAN ODT) 8 MG TABLET DISPERSIBLE Take 8 mg by mouth every 6 hours as needed.  6   • promethazine (PHENERGAN) 25 MG Tab Take 25 mg by mouth every 6 hours as needed for Nausea/Vomiting.     • CloNIDine HCl 0.1 MG TABLET SR 12 HR Take  by mouth.     • EPINEPHRINE HCL INJ by Injection route.     • celecoxib (CELEBREX) 200 MG Cap Take 200 mg by  mouth every day.     • fluticasone (FLONASE) 50 MCG/ACT nasal spray Spray 1 Spray in nose every day.     • Albuterol (VENTOLIN INH) Inhale  by mouth.     • nystatin (MYCOSTATIN) 181595 UNIT/ML Suspension Take 500,000 Units by mouth 4 times a day.     • ofloxacin (OCUFLOX) 0.3 % Solution Place 1 Drop in both eyes 4 times a day.     • albuterol (PROVENTIL) 2.5mg/3ml Nebu Soln solution for nebulization 3 mL by Nebulization route every four hours as needed for Shortness of Breath. 75 mL 0   • LOVASTATIN 40 MG PO TABS Take 1 Tab by mouth every day.     • CYCLOBENZAPRINE HCL 10 MG PO TABS Take 1 Tab by mouth every 8 hours as needed.     • HYDROCODONE-ACETAMINOPHEN  MG PO TABS Take 1 Tab by mouth every 8 hours as needed.     • clarithromycin (BIAXIN) 500 MG Tab Take 1 Tab by mouth 2 times a day. (Patient not taking: Reported on 12/9/2019) 20 Tab 0   • albuterol (PROVENTIL) 2.5mg/3ml Nebu Soln solution for nebulization 3 mL by Nebulization route every four hours as needed for Shortness of Breath. 30 Bullet 0   • doxycycline (MONODOX) 100 MG capsule   0   • clarithromycin (BIAXIN) 500 MG Tab Take 1 Tab by mouth 2 times a day. (Patient not taking: Reported on 11/19/2019) 20 Tab 0   • azithromycin (ZITHROMAX) 250 MG Tab 2 PO day #1 then 1 PO day #2-5 (Patient not taking: Reported on 10/29/2019) 6 Tab 0   • prochlorperazine (COMPAZINE) 10 MG Tab Take 1 Tab by mouth every 6 hours as needed. (Patient not taking: Reported on 10/21/2019) 30 Tab 0   • azithromycin (ZITHROMAX) 250 MG Tab Take as directed on package. 1 pack. (Patient not taking: Reported on 10/21/2019) 6 Tab 0   • MethylPREDNISolone (MEDROL DOSEPAK) 4 MG Tablet Therapy Pack Take as directed on package. 1 pack. (Patient not taking: Reported on 10/21/2019) 21 Tab 0   • diphenoxylate-atropine (LOMOTIL) 2.5-0.025 MG Tab   5   • ranitidine (ZANTAC) 150 MG capsule Take  by mouth.     • triamcinolone (KENALOG) 0.1 % lotion Apply to affected area twice a day (Patient  "not taking: Reported on 10/21/2019) 1 Bottle 0   • doxepin (SINEQUAN) 25 MG Cap Take 25 mg by mouth every evening.     • METHIMAZOLE 5 MG PO TABS Take 0.5 Tabs by mouth every day.     • BENAZEPRIL HCL 20 MG PO TABS Take 1 Tab by mouth every day.       No current facility-administered medications on file prior to visit.        Bee; Diphenoxylate; Penicillins; Soap; Tape; and Tetracycline      ROS:   Review of Systems   Constitutional: Negative for chills, diaphoresis, fever, malaise/fatigue and weight loss.   HENT: Positive for congestion. Negative for ear discharge, ear pain, hearing loss, nosebleeds, sinus pain, sore throat and tinnitus.    Eyes: Negative for blurred vision, double vision, photophobia, pain, discharge and redness.   Respiratory: Positive for cough, sputum production and shortness of breath. Negative for hemoptysis, wheezing and stridor.    Cardiovascular: Negative for chest pain, palpitations, orthopnea, claudication, leg swelling and PND.   Gastrointestinal: Negative for abdominal pain, constipation, diarrhea, heartburn, nausea and vomiting.   Genitourinary: Negative for dysuria and urgency.   Musculoskeletal: Negative for back pain, falls, joint pain, myalgias and neck pain.   Skin: Negative for itching and rash.   Neurological: Negative for dizziness, tremors, speech change, focal weakness, weakness and headaches.   Endo/Heme/Allergies: Negative for environmental allergies.   Psychiatric/Behavioral: Negative for depression.       /68 (BP Location: Left arm, Patient Position: Sitting, BP Cuff Size: Large adult)   Pulse (!) 50   Temp 36.3 °C (97.3 °F) (Temporal)   Resp 16   Ht 1.702 m (5' 7\")   Wt 106.6 kg (235 lb)   SpO2 97%   Physical Exam  Constitutional:       Appearance: Normal appearance. She is well-developed. She is obese.   HENT:      Head: Normocephalic and atraumatic.      Right Ear: External ear normal.      Left Ear: External ear normal.      Nose: Nose normal.      " Mouth/Throat:      Mouth: Mucous membranes are moist.      Pharynx: No oropharyngeal exudate.   Eyes:      General: No scleral icterus.     Extraocular Movements: Extraocular movements intact.      Conjunctiva/sclera: Conjunctivae normal.      Pupils: Pupils are equal, round, and reactive to light.   Neck:      Musculoskeletal: Normal range of motion and neck supple.      Vascular: No JVD.      Trachea: No tracheal deviation.   Cardiovascular:      Rate and Rhythm: Normal rate and regular rhythm.      Heart sounds: Normal heart sounds. No murmur. No friction rub. No gallop.    Pulmonary:      Effort: Pulmonary effort is normal. No accessory muscle usage or respiratory distress.      Breath sounds: Normal breath sounds. No wheezing or rales.   Abdominal:      General: There is no distension.      Palpations: Abdomen is soft.      Tenderness: There is no tenderness.   Musculoskeletal: Normal range of motion.         General: No tenderness or deformity.   Lymphadenopathy:      Cervical: No cervical adenopathy.   Skin:     General: Skin is warm and dry.      Findings: No rash.      Nails: There is no clubbing.     Neurological:      Mental Status: She is alert and oriented to person, place, and time.      Cranial Nerves: No cranial nerve deficit.      Gait: Gait normal.   Psychiatric:         Mood and Affect: Mood normal.         Behavior: Behavior normal.         PFTs as reviewed by me personally: as per HPI    Imaging as reviewed by me personally:  As per HPI    Assessment:  1. Chronic obstructive pulmonary disease, unspecified COPD type (HCC)  Fluticasone-Umeclidin-Vilant (TRELEGY ELLIPTA) 100-62.5-25 MCG/INH AEROSOL POWDER, BREATH ACTIVATED   2. Chronic sinusitis, unspecified location  montelukast (SINGULAIR) 10 MG Tab   3. Tobacco use         Plan:  1.  The patient has at least moderate COPD based on symptoms of recurrent bronchitis and pulmonary function test with reduced FEV1 and air trapping.  Her medication  regimen is not optimal at this point and she continues to smoke.  She was advised on tobacco cessation and offered Chantix.  She would like to consider this after the holidays.  She cannot drive the distance for tobacco cessation classes.  We will stop Flovent and start Trelegy in addition to airway clearance regimen with nebulized bronchodilators and mucolytic with Mucinex dosed twice daily.  We will see her back in 3 months to reassess her symptoms.  2.  Patient has suffered from chronic sinusitis and has history of injury to her sinuses.  I offered to refer her to ENT given ongoing upper airway inflammation can be contributing to her recurrent episodes of bronchitis.  She would like to follow-up on her own with known ENT.  In the meantime I did recommend that she consider taking an over-the-counter antihistamine such as Allegra, Zyrtec or Claritin, generic equivalent would be fine.  We did resume Singulair for any allergic component and we will optimize her COPD medications including mucus clearance regimen as per above.  3.  Patient has history of significant tobacco use with ongoing use.  She was counseled on cessation and offered pharmacologic therapy as well as tobacco cessation classes.  She is unable to make the classes but would like to consider Chantix.  She will call when she is ready to start this.  In the meantime she did decline referral to lung cancer screening program however we will continue to discuss this.  Return in about 3 months (around 3/9/2020) for copd.

## 2019-12-15 ENCOUNTER — HOSPITAL ENCOUNTER (EMERGENCY)
Facility: MEDICAL CENTER | Age: 59
End: 2019-12-15
Attending: EMERGENCY MEDICINE

## 2019-12-15 ENCOUNTER — APPOINTMENT (OUTPATIENT)
Dept: RADIOLOGY | Facility: MEDICAL CENTER | Age: 59
End: 2019-12-15
Attending: EMERGENCY MEDICINE

## 2019-12-15 VITALS
WEIGHT: 235.89 LBS | HEIGHT: 67 IN | RESPIRATION RATE: 20 BRPM | HEART RATE: 90 BPM | SYSTOLIC BLOOD PRESSURE: 129 MMHG | TEMPERATURE: 97.2 F | DIASTOLIC BLOOD PRESSURE: 78 MMHG | OXYGEN SATURATION: 93 % | BODY MASS INDEX: 37.02 KG/M2
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
## 2019-12-15 DIAGNOSIS — M54.50 LUMBAR BACK PAIN: ICD-10-CM

## 2019-12-15 PROCEDURE — 72128 CT CHEST SPINE W/O DYE: CPT

## 2019-12-15 PROCEDURE — 99284 EMERGENCY DEPT VISIT MOD MDM: CPT

## 2019-12-15 PROCEDURE — 73590 X-RAY EXAM OF LOWER LEG: CPT | Mod: RT

## 2019-12-15 PROCEDURE — 700111 HCHG RX REV CODE 636 W/ 250 OVERRIDE (IP): Performed by: EMERGENCY MEDICINE

## 2019-12-15 PROCEDURE — 72131 CT LUMBAR SPINE W/O DYE: CPT

## 2019-12-15 PROCEDURE — 700102 HCHG RX REV CODE 250 W/ 637 OVERRIDE(OP): Performed by: EMERGENCY MEDICINE

## 2019-12-15 PROCEDURE — A9270 NON-COVERED ITEM OR SERVICE: HCPCS | Performed by: EMERGENCY MEDICINE

## 2019-12-15 RX ORDER — CYCLOBENZAPRINE HCL 10 MG
10 TABLET ORAL ONCE
Status: COMPLETED | OUTPATIENT
Start: 2019-12-15 | End: 2019-12-15

## 2019-12-15 RX ORDER — ONDANSETRON 4 MG/1
4 TABLET, ORALLY DISINTEGRATING ORAL ONCE
Status: COMPLETED | OUTPATIENT
Start: 2019-12-15 | End: 2019-12-15

## 2019-12-15 RX ORDER — OXYCODONE HYDROCHLORIDE 5 MG/1
10 TABLET ORAL ONCE
Status: COMPLETED | OUTPATIENT
Start: 2019-12-15 | End: 2019-12-15

## 2019-12-15 RX ADMIN — OXYCODONE HYDROCHLORIDE 10 MG: 5 TABLET ORAL at 23:20

## 2019-12-15 RX ADMIN — OXYCODONE HYDROCHLORIDE 10 MG: 5 TABLET ORAL at 20:00

## 2019-12-15 RX ADMIN — CYCLOBENZAPRINE HYDROCHLORIDE 10 MG: 10 TABLET, FILM COATED ORAL at 20:00

## 2019-12-15 RX ADMIN — ONDANSETRON 4 MG: 4 TABLET, ORALLY DISINTEGRATING ORAL at 20:01

## 2019-12-16 ASSESSMENT — ENCOUNTER SYMPTOMS
SORE THROAT: 0
MYALGIAS: 0
BLOOD IN STOOL: 0
GASTROINTESTINAL NEGATIVE: 1
HEADACHES: 0
NECK PAIN: 0
BACK PAIN: 1
CONSTITUTIONAL NEGATIVE: 1
WEAKNESS: 0
BRUISES/BLEEDS EASILY: 0
CARDIOVASCULAR NEGATIVE: 1
SEIZURES: 0
EYE PAIN: 0
FOCAL WEAKNESS: 0
EYES NEGATIVE: 1
SHORTNESS OF BREATH: 0
HALLUCINATIONS: 0
FEVER: 0
RESPIRATORY NEGATIVE: 1
FLANK PAIN: 0
ABDOMINAL PAIN: 0

## 2019-12-16 NOTE — ED TRIAGE NOTES
"Presents complaining of a GLF earlier this afternoon \"inside the house\"  over linoleum surface.  She C/O back pain primarily.  She explains falling backwards.  Denies any other obvious injuries.  Chief Complaint   Patient presents with   • T-5000 FALL   • Back Pain     BP (!) 179/98   Pulse 76   Temp 36.6 °C (97.8 °F) (Temporal)   Resp 18   Ht 1.702 m (5' 7\")   Wt 107 kg (235 lb 14.3 oz)   SpO2 93%   BMI 36.95 kg/m²     "

## 2019-12-16 NOTE — DISCHARGE INSTRUCTIONS
Please discuss the following findings with your regular doctor:  CT-LSPINE W/O PLUS RECONS   Final Result         1.  No acute traumatic bony injury of the lumbar spine.   2.  Cirrhotic changes of the bilateral SI joints, appearance suggests sacroiliitis is a broad differential. Workup and evaluation as clinically appropriate.   3.  Atherosclerosis      CT-TSPINE W/O PLUS RECONS   Final Result         1.  No acute traumatic bony injury of the thoracic spine.   2.  Atherosclerosis and atherosclerotic coronary artery disease      DX-TIBIA AND FIBULA RIGHT   Final Result         1.  No acute traumatic bony injury.

## 2019-12-16 NOTE — ED PROVIDER NOTES
ED Provider Note    CHIEF COMPLAINT  Chief Complaint   Patient presents with   • T-5000 FALL   • Back Pain       HPI  HPI   59 y.o. F p/w CC of fall.  Patient reports mechanical slip and fall prior to arrival.  Patient denies any head strike or loss of consciousness or vomiting.  Patient is not anticoagulated.  Patient reports ongoing back pain after fall.   Patient denies any weakness or numbness.  Patient describes back pain as achy and constant and worse with range of motion.  Patient also complaining of right lateral leg pain.  Patient denies any lacerations.        REVIEW OF SYSTEMS  Review of Systems   Constitutional: Negative.  Negative for fever.   HENT: Negative.  Negative for ear pain and sore throat.    Eyes: Negative.  Negative for pain.   Respiratory: Negative.  Negative for shortness of breath.    Cardiovascular: Negative.  Negative for chest pain.   Gastrointestinal: Negative.  Negative for abdominal pain and blood in stool.   Genitourinary: Negative for dysuria and flank pain.   Musculoskeletal: Positive for back pain. Negative for myalgias and neck pain.   Skin: Negative.  Negative for rash.   Neurological: Negative for focal weakness, seizures, weakness and headaches.   Endo/Heme/Allergies: Does not bruise/bleed easily.   Psychiatric/Behavioral: Negative for hallucinations and suicidal ideas.   All other systems reviewed and are negative.      PAST MEDICAL HISTORY   has a past medical history of Arthritis, Chronic airway obstruction, not elsewhere classified, Dental disorder, Grave's disease, Hypertension, IBS (irritable bowel syndrome), Pain, and Personal history of venous thrombosis and embolism (6/23/2009).    SOCIAL HISTORY  Social History     Tobacco Use   • Smoking status: Current Every Day Smoker     Packs/day: 1.00     Years: 30.00     Pack years: 30.00     Types: Cigarettes     Start date: 1979   • Smokeless tobacco: Never Used   Substance and Sexual Activity   • Alcohol use: No   • Drug  "use: No   • Sexual activity: Not Currently       SURGICAL HISTORY   has a past surgical history that includes tubal ligation (1994); hysterectomy, vaginal (1999); diskectomy,percutaneous lumbar (2001); lumbar fusion posterior (2004); tonsillectomy (1968); and acl reconstruction scope (7/9/2009).    CURRENT MEDICATIONS  Home Medications    **Home medications have not yet been reviewed for this encounter**         ALLERGIES  Allergies   Allergen Reactions   • Bee    • Diphenoxylate    • Penicillins    • Soap    • Tape    • Tetracycline        PHYSICAL EXAM  VITAL SIGNS: /78   Pulse 90   Temp 36.2 °C (97.2 °F) (Temporal)   Resp 20   Ht 1.702 m (5' 7\")   Wt 107 kg (235 lb 14.3 oz)   SpO2 93%   BMI 36.95 kg/m²  @YESSI[[id number]::@  Pulse ox interpretation: I interpret this pulse ox as normal.    Physical Exam   Constitutional: She is oriented to person, place, and time and well-developed, well-nourished, and in no distress.   HENT:   Head: Normocephalic and atraumatic.   Right Ear: External ear normal.   Left Ear: External ear normal.   Eyes: Conjunctivae and EOM are normal. No scleral icterus.   Neck: Normal range of motion.   Cardiovascular: Normal rate.   Pulmonary/Chest: Effort normal. No stridor. No respiratory distress. She has no wheezes.   Abdominal: She exhibits no distension. There is no tenderness.   Musculoskeletal: Normal range of motion.         General: No deformity or edema.   Neurological: She is alert and oriented to person, place, and time. Coordination normal.   No C spine step-offs or deformities, +T/L spine tenderness to palpation.     Skin: Skin is warm and dry. No rash noted. No erythema.   Psychiatric: Affect and judgment normal.   Extremity: Right lower extremity with abrasion to lateral aspect.  No bleeding or laceration present.    DIAGNOSTIC STUDIES / PROCEDURES    LABS/EKG  Results for orders placed or performed during the hospital encounter of 11/19/19   FREE THYROXINE   Result " Value Ref Range    Free T-4 0.80 0.53 - 1.43 ng/dL   T3 FREE   Result Value Ref Range    T3,Free 3.21 2.40 - 4.20 pg/mL   TSH   Result Value Ref Range    TSH 2.540 0.380 - 5.330 uIU/mL   TRIIDOTHYRONINE   Result Value Ref Range    T3 108.2 60.0 - 181.0 ng/dL   THYROID PEROXIDASE  (TPO) AB   Result Value Ref Range    Microsomal -Tpo- Abs 3.7 0.0 - 9.0 IU/mL   VITAMIN D,25 HYDROXY   Result Value Ref Range    25-Hydroxy   Vitamin D 25 7 (L) 30 - 100 ng/mL   VITAMIN B12   Result Value Ref Range    Vitamin B12 -True Cobalamin 258 211 - 911 pg/mL   ACTH   Result Value Ref Range    Acth 5.9 (L) 7.2 - 63.3 pg/mL   IONIZED CALCIUM   Result Value Ref Range    Ionized Calcium 1.3 1.1 - 1.3 mmol/L   PTH WITH CALCIUM   Result Value Ref Range    Pth, Intact 38.0 14.0 - 72.0 pg/mL    Calcium 9.9 8.5 - 10.5 mg/dL       RADIOLOGY  CT-LSPINE W/O PLUS RECONS   Final Result         1.  No acute traumatic bony injury of the lumbar spine.   2.  Cirrhotic changes of the bilateral SI joints, appearance suggests sacroiliitis is a broad differential. Workup and evaluation as clinically appropriate.   3.  Atherosclerosis      CT-TSPINE W/O PLUS RECONS   Final Result         1.  No acute traumatic bony injury of the thoracic spine.   2.  Atherosclerosis and atherosclerotic coronary artery disease      DX-TIBIA AND FIBULA RIGHT   Final Result         1.  No acute traumatic bony injury.           COURSE & MEDICAL DECISION MAKING  Pertinent Labs & Imaging studies reviewed by me. (See chart for details)    59 y.o. female PMH chronic back pain and prior surg p/w fall and new back pain.     Differential diagnosis includes but is not limited to:  No weakness or numbness to suggest cauda equina syndrome.  No IVDU/fever. No history of cancer/unintentional weight loss. No bowel/bladder dysfunction. No numbness/weakness/saddle anesthesia.    Given recent trauma CT scan obtained by me with no obvious traumatic fracture however abnormal finding seen above.   Patient agrees to follow-up with orthopedist tomorrow.    Pain likely represents MSK pain.  Pt given below meds for sx relief in ED  Full range of motion of right ankle.    Regarding right lower extremity abrasion patient agrees to irrigate thoroughly with water at home and does not wish us to irrigate at this time.       Medications   oxyCODONE immediate-release (ROXICODONE) tablet 10 mg (10 mg Oral Given 12/15/19 2000)   ondansetron (ZOFRAN ODT) dispertab 4 mg (4 mg Oral Given 12/15/19 2001)   cyclobenzaprine (FLEXERIL) tablet 10 mg (10 mg Oral Given 12/15/19 2000)   oxyCODONE immediate-release (ROXICODONE) tablet 10 mg (10 mg Oral Given 12/15/19 6570)        FINAL IMPRESSION  Visit Diagnoses     ICD-10-CM   1. Fall, initial encounter W19.XXXA   2. Lumbar back pain M54.5              Electronically signed by: Jim Mcdaniel, 12/15/2019 7:57 PM

## 2019-12-18 ENCOUNTER — TELEPHONE (OUTPATIENT)
Dept: CARDIOLOGY | Facility: MEDICAL CENTER | Age: 59
End: 2019-12-18

## 2019-12-18 ENCOUNTER — OFFICE VISIT (OUTPATIENT)
Dept: CARDIOLOGY | Facility: MEDICAL CENTER | Age: 59
End: 2019-12-18
Payer: COMMERCIAL

## 2019-12-18 VITALS
BODY MASS INDEX: 36.88 KG/M2 | SYSTOLIC BLOOD PRESSURE: 118 MMHG | DIASTOLIC BLOOD PRESSURE: 78 MMHG | HEART RATE: 82 BPM | HEIGHT: 67 IN | OXYGEN SATURATION: 92 % | WEIGHT: 235 LBS

## 2019-12-18 DIAGNOSIS — E78.5 DYSLIPIDEMIA: ICD-10-CM

## 2019-12-18 DIAGNOSIS — Z76.89 ENCOUNTER TO ESTABLISH CARE: ICD-10-CM

## 2019-12-18 DIAGNOSIS — Z72.0 TOBACCO USE: ICD-10-CM

## 2019-12-18 DIAGNOSIS — R09.89 BRUIT OF RIGHT CAROTID ARTERY: ICD-10-CM

## 2019-12-18 DIAGNOSIS — I10 HYPERTENSION, ESSENTIAL: ICD-10-CM

## 2019-12-18 DIAGNOSIS — I70.0 AORTIC ATHEROSCLEROSIS (HCC): ICD-10-CM

## 2019-12-18 DIAGNOSIS — R00.2 PALPITATIONS: ICD-10-CM

## 2019-12-18 LAB — EKG IMPRESSION: NORMAL

## 2019-12-18 PROCEDURE — 99406 BEHAV CHNG SMOKING 3-10 MIN: CPT | Performed by: INTERNAL MEDICINE

## 2019-12-18 PROCEDURE — 99204 OFFICE O/P NEW MOD 45 MIN: CPT | Mod: 25 | Performed by: INTERNAL MEDICINE

## 2019-12-18 PROCEDURE — 93000 ELECTROCARDIOGRAM COMPLETE: CPT | Performed by: INTERNAL MEDICINE

## 2019-12-18 NOTE — PROGRESS NOTES
CARDIOLOGY NEW PATIENT CONSULTATION    PCP: Duane Gonzales P.A.-C.    1. Hypertension, essential  Well-controlled.  Continue the current treatment plan which includes as needed antihypertensives    2. Dyslipidemia  Reasonable control.  She has been advised to stop taking lovastatin temporarily while using Bactrim.  She will resume in the future    3. Tobacco use  I counseled her for greater than 3 minutes about the need for smoking cessation.    4. Bruit of right carotid artery  Asymptomatic.  -Carotid duplex ordered    5. Palpitations  Infrequent episodes which are relatively short-lived, improved following thyroid treatment    6. Aortic atherosclerosis (HCC)  Incidentally noted on CT study.  She is very concerned about her cardiovascular risk.  I recommended statin and healthy lifestyle practices with emphasis on smoking cessation.      Follow up with Jose Mauricio M.D. To be determined after testing is complete      Chief Complaint   Patient presents with   • Establish Care       History: Anette Yost is a 59 y.o. female with a past medical history of Thyroid disease, spine disease with chronic pain and hyperlipidemia and hypertension presenting for consultation regarding atherosclerosis.  She recently had a CT study of the spine after suffering a fall.  No traumatic spinal abnormality was found but there was incidental note of aortic atherosclerosis.  There also seems to be some calcification in the posterior descending artery though the heart was predominantly out of the field-of-view.  She was referred for further discussion.  She has been taking statin medication for some time but is on a holiday while using Bactrim.  She plans to resume in the future.  She smokes a pack of cigarettes daily but hopes to quit.  She has had several successful quitting attempts in the past and anticipates another attempt after [place].  She is a  and is frequently required to walk or stand for  "prolonged periods she does not get angina or claudication and is predominantly limited by low back pain and knee pain.  She had high blood pressure in the past but this is been well regulated and now she just has as needed medication available to her.    ROS:  All other systems reviewed and negative except as per the HPI    PE:  /78 (BP Location: Left arm, Patient Position: Sitting, BP Cuff Size: Adult)   Pulse 82   Ht 1.702 m (5' 7\")   Wt 106.6 kg (235 lb)   SpO2 92%   BMI 36.81 kg/m²   GEN: Well appearing  HEENT: Symmetric face. Anicteric sclerae. Moist mucus membranes  NECK: No JVD. No lymphadenopathy  CARDIAC: Normal PMI, regular, normal S1, S2.  VASCULATURE: Normal carotid amplitude with a right-sided carotid bruit  RESP: Clear to auscultation bilaterally  ABD: Soft, non-tender, non-distended  EXT: No edema, no clubbing or cyanosis  SKIN: Warm and dry  NEURO: No gross deficits  PSYCH: Appropriate affect, participates in conversation    Past Medical History:   Diagnosis Date   • Arthritis    • Chronic airway obstruction, not elsewhere classified    • Dental disorder     dentures   • Grave's disease    • Hypertension    • IBS (irritable bowel syndrome)    • Pain     lower back   • Personal history of venous thrombosis and embolism 6/23/2009    right leg     Past Surgical History:   Procedure Laterality Date   • ACL RECONSTRUCTION SCOPE  7/9/2009    Performed by RENEE LEVI at SURGERY [facility] ORS   • LUMBAR FUSION POSTERIOR  2004   • PB DISKECTOMY,PERCUTANEOUS LUMBAR  2001   • HYSTERECTOMY, VAGINAL  1999   • TUBAL LIGATION  1994   • TONSILLECTOMY  1968     Allergies   Allergen Reactions   • Bee    • Diphenoxylate    • Penicillins    • Soap    • Tape    • Tetracycline      Outpatient Encounter Medications as of 12/18/2019   Medication Sig Dispense Refill   • Fluticasone-Umeclidin-Vilant (TRELEGY ELLIPTA) 100-62.5-25 MCG/INH AEROSOL POWDER, BREATH ACTIVATED Inhale 1 Inhalation by mouth every " day. 1 Each 11   • montelukast (SINGULAIR) 10 MG Tab Take 1 Tab by mouth every evening. 30 Tab 11   • vitamin D, Ergocalciferol, (DRISDOL) 83270 units Cap capsule Take 1 Cap by mouth every 7 days. 4 Cap 2   • Respiratory Therapy Supplies (PULMONEB LT) Device   0   • VENTOLIN  (90 Base) MCG/ACT Aero Soln inhalation aerosol   5   • benzonatate (TESSALON) 100 MG Cap Take 1 Cap by mouth 3 times a day as needed for Cough. 60 Cap 0   • sumatriptan (IMITREX) 100 MG tablet Take 1 Tab by mouth Once PRN.  1   • dicyclomine (BENTYL) 20 MG Tab Take 20 mg by mouth every 8 hours as needed.     • Krill Oil 500 MG Cap Take 1 Cap by mouth every day.     • aspirin (ASA) 81 MG Chew Tab chewable tablet Take 81 mg by mouth every day.     • Melatonin 10 MG Tab Take 1-2 Tabs by mouth every bedtime.     • Throat Lozenges (COUGH DROPS MT) Spray  in mouth/throat.     • loperamide (IMODIUM) 2 MG Cap Take 2 mg by mouth 4 times a day as needed for Diarrhea.     • Aspirin-Acetaminophen-Caffeine (EXCEDRIN MIGRAINE PO) Take 1-2 Tabs by mouth Once PRN.     • ondansetron (ZOFRAN ODT) 8 MG TABLET DISPERSIBLE Take 8 mg by mouth every 6 hours as needed.  6   • promethazine (PHENERGAN) 25 MG Tab Take 25 mg by mouth every 6 hours as needed for Nausea/Vomiting.     • CloNIDine HCl 0.1 MG TABLET SR 12 HR Take  by mouth.     • EPINEPHRINE HCL INJ by Injection route.     • celecoxib (CELEBREX) 200 MG Cap Take 200 mg by mouth every day.     • fluticasone (FLONASE) 50 MCG/ACT nasal spray Spray 1 Spray in nose every day.     • Albuterol (VENTOLIN INH) Inhale  by mouth.     • nystatin (MYCOSTATIN) 437800 UNIT/ML Suspension Take 500,000 Units by mouth 4 times a day.     • ofloxacin (OCUFLOX) 0.3 % Solution Place 1 Drop in both eyes 4 times a day.     • albuterol (PROVENTIL) 2.5mg/3ml Nebu Soln solution for nebulization 3 mL by Nebulization route every four hours as needed for Shortness of Breath. 75 mL 0   • CYCLOBENZAPRINE HCL 10 MG PO TABS Take 1 Tab by  mouth every 8 hours as needed.     • HYDROCODONE-ACETAMINOPHEN  MG PO TABS Take 1 Tab by mouth every 8 hours as needed.     • [DISCONTINUED] clarithromycin (BIAXIN) 500 MG Tab Take 1 Tab by mouth 2 times a day. (Patient not taking: Reported on 12/9/2019) 20 Tab 0   • [DISCONTINUED] albuterol (PROVENTIL) 2.5mg/3ml Nebu Soln solution for nebulization 3 mL by Nebulization route every four hours as needed for Shortness of Breath. 30 Bullet 0   • [DISCONTINUED] riFAXIMin (XIFAXAN) 550 MG Tab tablet Take 550 mg by mouth 2 Times a Day.     • [DISCONTINUED] aspirin effervescent (ALLYSSA-SELTZER) 325 MG Effer Tab Take 2 Tabs by mouth every four hours as needed.     • doxycycline (MONODOX) 100 MG capsule   0   • [DISCONTINUED] clarithromycin (BIAXIN) 500 MG Tab Take 1 Tab by mouth 2 times a day. (Patient not taking: Reported on 11/19/2019) 20 Tab 0   • [DISCONTINUED] azithromycin (ZITHROMAX) 250 MG Tab 2 PO day #1 then 1 PO day #2-5 (Patient not taking: Reported on 10/29/2019) 6 Tab 0   • [DISCONTINUED] prochlorperazine (COMPAZINE) 10 MG Tab Take 1 Tab by mouth every 6 hours as needed. (Patient not taking: Reported on 10/21/2019) 30 Tab 0   • [DISCONTINUED] azithromycin (ZITHROMAX) 250 MG Tab Take as directed on package. 1 pack. (Patient not taking: Reported on 10/21/2019) 6 Tab 0   • [DISCONTINUED] MethylPREDNISolone (MEDROL DOSEPAK) 4 MG Tablet Therapy Pack Take as directed on package. 1 pack. (Patient not taking: Reported on 10/21/2019) 21 Tab 0   • ranitidine (ZANTAC) 150 MG capsule Take  by mouth.     • [DISCONTINUED] diphenoxylate-atropine (LOMOTIL) 2.5-0.025 MG Tab   5   • [DISCONTINUED] triamcinolone (KENALOG) 0.1 % lotion Apply to affected area twice a day (Patient not taking: Reported on 10/21/2019) 1 Bottle 0   • [DISCONTINUED] doxepin (SINEQUAN) 25 MG Cap Take 25 mg by mouth every evening.     • [DISCONTINUED] METHIMAZOLE 5 MG PO TABS Take 0.5 Tabs by mouth every day.     • [DISCONTINUED] BENAZEPRIL HCL 20 MG  PO TABS Take 1 Tab by mouth every day.     • [DISCONTINUED] LOVASTATIN 40 MG PO TABS Take 1 Tab by mouth every day.       No facility-administered encounter medications on file as of 12/18/2019.      Social History     Socioeconomic History   • Marital status:      Spouse name: Not on file   • Number of children: Not on file   • Years of education: Not on file   • Highest education level: Not on file   Occupational History   • Not on file   Social Needs   • Financial resource strain: Not on file   • Food insecurity:     Worry: Not on file     Inability: Not on file   • Transportation needs:     Medical: Not on file     Non-medical: Not on file   Tobacco Use   • Smoking status: Current Every Day Smoker     Packs/day: 1.00     Years: 30.00     Pack years: 30.00     Types: Cigarettes     Start date: 1979   • Smokeless tobacco: Never Used   Substance and Sexual Activity   • Alcohol use: No   • Drug use: No   • Sexual activity: Not Currently   Lifestyle   • Physical activity:     Days per week: Not on file     Minutes per session: Not on file   • Stress: Not on file   Relationships   • Social connections:     Talks on phone: Not on file     Gets together: Not on file     Attends Yazidism service: Not on file     Active member of club or organization: Not on file     Attends meetings of clubs or organizations: Not on file     Relationship status: Not on file   • Intimate partner violence:     Fear of current or ex partner: Not on file     Emotionally abused: Not on file     Physically abused: Not on file     Forced sexual activity: Not on file   Other Topics Concern   • Not on file   Social History Narrative   • Not on file         Family History   Problem Relation Age of Onset   • Cancer Maternal Grandmother         breast    • Cancer Maternal Aunt    • Cancer Mother         mm    • Cancer Father         colon cancer    • Hypertension Sister    • Diabetes Sister    • Cancer Maternal Grandfather          barretts/lung    • Cancer Paternal Grandmother    • Lung Disease Paternal Grandfather    • Cancer Paternal Grandfather         lung   • Cancer Other         hodkins (remission)    • Hypertension Brother    • Diabetes Brother          Studies  Lab Results   Component Value Date/Time    CHOLSTRLTOT 222 (H) 11/17/2015 02:15 PM     (H) 11/17/2015 02:15 PM    HDL 51 11/17/2015 02:15 PM    TRIGLYCERIDE 222 (H) 11/17/2015 02:15 PM       Lab Results   Component Value Date/Time    SODIUM 130 (L) 05/31/2019 06:30 PM    POTASSIUM 4.9 05/31/2019 06:30 PM    CHLORIDE 99 05/31/2019 06:30 PM    CO2 18 (L) 05/31/2019 06:30 PM    GLUCOSE 141 (H) 05/31/2019 06:30 PM    BUN 22 05/31/2019 06:30 PM    CREATININE 1.19 05/31/2019 06:30 PM     Lab Results   Component Value Date/Time    ALKPHOSPHAT 91 05/31/2019 06:30 PM    ASTSGOT 19 05/31/2019 06:30 PM    ALTSGPT 15 05/31/2019 06:30 PM    TBILIRUBIN 0.6 05/31/2019 06:30 PM        For this encounter I directly reviewed ECG tracings, medical records and CT images I agree with the interpretations in the electronic health record

## 2020-04-23 ENCOUNTER — TELEPHONE (OUTPATIENT)
Dept: CARDIOLOGY | Facility: MEDICAL CENTER | Age: 60
End: 2020-04-23

## 2020-04-23 DIAGNOSIS — R09.89 BRUIT OF RIGHT CAROTID ARTERY: ICD-10-CM

## 2020-04-23 NOTE — TELEPHONE ENCOUNTER
"Per BE note on 12/18/19, \"Carotid duplex ordered,\" but no order seen in chart. When attempting to place order for asymptomatic right carotid bruit, there is a message that states: \"If no stenosis is identified, do not continue to order this test for an asymptomatic bruit.\"    To BE  "

## 2020-04-23 NOTE — TELEPHONE ENCOUNTER
"You  Jose Mauricio M.D. 11 minutes ago (3:24 PM)      Your note on 12/18/19 mentions a carotid duplex, but there wasn't an active order. I tried to place one, but I received a message that an asymptomatic bruit doesn't qualify. Is there another diagnosis that could be used?      Jose Mauricio M.D.  You 5 minutes ago (3:29 PM)      Im not sure- I think asymptomatic bruit should be a covered indication as that seems like the most likely scenario the test is ordered. Perhaps the vascular lab knows that the issue is      Called vascular lab at [phone] [place]. She also believes that asymptomatic bruit should be covered as it is one of the most likely reasons to complete study. Order placed and selected 'No\" for stenosis identified. Called pt and notified that order is now in the system.   "

## 2020-04-23 NOTE — TELEPHONE ENCOUNTER
BE    Hello, per patient Dr. Mauricio wanted her to have an ultra sound done at the [facility]. She called [facility] and there is no order for pt she will like a call back at [phone]

## 2020-04-24 ENCOUNTER — HOSPITAL ENCOUNTER (OUTPATIENT)
Dept: RADIOLOGY | Facility: MEDICAL CENTER | Age: 60
End: 2020-04-24
Attending: INTERNAL MEDICINE
Payer: COMMERCIAL

## 2020-04-24 ENCOUNTER — HOSPITAL ENCOUNTER (OUTPATIENT)
Dept: LAB | Facility: MEDICAL CENTER | Age: 60
End: 2020-04-24
Attending: PHYSICIAN ASSISTANT
Payer: COMMERCIAL

## 2020-04-24 ENCOUNTER — HOSPITAL ENCOUNTER (OUTPATIENT)
Dept: RADIOLOGY | Facility: MEDICAL CENTER | Age: 60
End: 2020-04-24
Attending: PHYSICIAN ASSISTANT
Payer: COMMERCIAL

## 2020-04-24 DIAGNOSIS — E04.2 MULTIPLE THYROID NODULES: ICD-10-CM

## 2020-04-24 DIAGNOSIS — R09.89 BRUIT OF RIGHT CAROTID ARTERY: ICD-10-CM

## 2020-04-24 LAB
ALBUMIN SERPL BCP-MCNC: 4.6 G/DL (ref 3.2–4.9)
ALBUMIN/GLOB SERPL: 1.6 G/DL
ALP SERPL-CCNC: 106 U/L (ref 30–99)
ALT SERPL-CCNC: 16 U/L (ref 2–50)
ANION GAP SERPL CALC-SCNC: 15 MMOL/L (ref 7–16)
AST SERPL-CCNC: 17 U/L (ref 12–45)
BILIRUB SERPL-MCNC: 0.4 MG/DL (ref 0.1–1.5)
BUN SERPL-MCNC: 19 MG/DL (ref 8–22)
CALCIUM SERPL-MCNC: 9.6 MG/DL (ref 8.4–10.2)
CHLORIDE SERPL-SCNC: 101 MMOL/L (ref 96–112)
CO2 SERPL-SCNC: 22 MMOL/L (ref 20–33)
CREAT SERPL-MCNC: 1.31 MG/DL (ref 0.5–1.4)
FASTING STATUS PATIENT QL REPORTED: NORMAL
GLOBULIN SER CALC-MCNC: 2.9 G/DL (ref 1.9–3.5)
GLUCOSE SERPL-MCNC: 110 MG/DL (ref 65–99)
POTASSIUM SERPL-SCNC: 4.4 MMOL/L (ref 3.6–5.5)
PROT SERPL-MCNC: 7.5 G/DL (ref 6–8.2)
SODIUM SERPL-SCNC: 138 MMOL/L (ref 135–145)
T4 FREE SERPL-MCNC: 1 NG/DL (ref 0.58–1.64)
TSH SERPL DL<=0.005 MIU/L-ACNC: 2.08 UIU/ML (ref 0.38–5.33)

## 2020-04-24 PROCEDURE — 93880 EXTRACRANIAL BILAT STUDY: CPT

## 2020-04-24 PROCEDURE — 84439 ASSAY OF FREE THYROXINE: CPT

## 2020-04-24 PROCEDURE — 76536 US EXAM OF HEAD AND NECK: CPT

## 2020-04-24 PROCEDURE — 84443 ASSAY THYROID STIM HORMONE: CPT

## 2020-04-24 PROCEDURE — 80053 COMPREHEN METABOLIC PANEL: CPT

## 2020-04-24 PROCEDURE — 36415 COLL VENOUS BLD VENIPUNCTURE: CPT

## 2020-04-28 ENCOUNTER — TELEPHONE (OUTPATIENT)
Dept: CARDIOLOGY | Facility: MEDICAL CENTER | Age: 60
End: 2020-04-28

## 2020-04-28 NOTE — TELEPHONE ENCOUNTER
Result Notes for US-CAROTID DOPPLER BILAT   Notes recorded by Jose Mauricio M.D. on 4/28/2020 at 11:38 AM PDT  The carotid Doppler does not show any evidence of restricted blood flow in the carotid arteries; which was the concern when hearing a bruit during our office visit.  No additional evaluation of this is necessary     LVM with pt at [phone] and [phone] notifying that carotid US did not show anything concerning and encouraging her to call back with any questions or to discuss in more detail.

## 2020-05-06 ENCOUNTER — TELEPHONE (OUTPATIENT)
Dept: ENDOCRINOLOGY | Facility: MEDICAL CENTER | Age: 60
End: 2020-05-06

## 2020-05-06 ENCOUNTER — OFFICE VISIT (OUTPATIENT)
Dept: ENDOCRINOLOGY | Facility: MEDICAL CENTER | Age: 60
End: 2020-05-06
Payer: COMMERCIAL

## 2020-05-06 VITALS
HEART RATE: 91 BPM | SYSTOLIC BLOOD PRESSURE: 116 MMHG | WEIGHT: 235 LBS | TEMPERATURE: 97.5 F | HEIGHT: 68 IN | DIASTOLIC BLOOD PRESSURE: 88 MMHG | BODY MASS INDEX: 35.61 KG/M2

## 2020-05-06 DIAGNOSIS — E55.9 VITAMIN D DEFICIENCY: ICD-10-CM

## 2020-05-06 DIAGNOSIS — E05.90 HYPERTHYROIDISM: ICD-10-CM

## 2020-05-06 DIAGNOSIS — E05.00 GRAVES' OPHTHALMOPATHY: ICD-10-CM

## 2020-05-06 DIAGNOSIS — E53.8 VITAMIN B 12 DEFICIENCY: ICD-10-CM

## 2020-05-06 DIAGNOSIS — Z86.39 HISTORY OF GRAVES' DISEASE: ICD-10-CM

## 2020-05-06 PROCEDURE — 99443 PR PHYSICIAN TELEPHONE EVALUATION 21-30 MIN: CPT | Mod: CR | Performed by: INTERNAL MEDICINE

## 2020-05-06 RX ORDER — BENAZEPRIL HYDROCHLORIDE 10 MG/1
20 TABLET ORAL DAILY
COMMUNITY
End: 2020-06-25 | Stop reason: SDUPTHER

## 2020-05-06 RX ORDER — PANTOPRAZOLE SODIUM 40 MG/1
40 TABLET, DELAYED RELEASE ORAL DAILY
COMMUNITY
End: 2022-12-05

## 2020-05-06 RX ORDER — ERGOCALCIFEROL 1.25 MG/1
50000 CAPSULE ORAL
Qty: 4 CAP | Refills: 2 | Status: SHIPPED | OUTPATIENT
Start: 2020-05-06 | End: 2020-07-27 | Stop reason: SDUPTHER

## 2020-05-06 ASSESSMENT — FIBROSIS 4 INDEX: FIB4 SCORE: 0.93

## 2020-05-06 NOTE — TELEPHONE ENCOUNTER
1. Caller Name: [phone]                        Call Back Number: [phone]      How would the patient prefer to be contacted with a response: Phone call OK to leave a detailed message    Patient called because she tried to schedule an appointment with Dr. De Jesus but they will not check her eyes without a referral she wanted to see if DR. Mejia could write that referral.

## 2020-05-06 NOTE — PROGRESS NOTES
Endocrinology Clinic Progress Note  PCP: Duane Gonzales P.A.-C.    As a means of avoiding spread of COVID-19, this visit is being conducted by telephone. This telephone visit was initiated by the patient and they verbally consented.  Start of Call:  2.30pm            End of Call:      2.59 pm.         HPI:  Anette Villegas is a 60 y.o. old patient who is seen today for review of her endocrine problems.   She is a new patient to me today, previously seen by Lucy Velázquez PA-C.   She has a history of Graves Disease in the past.  She was previously on Methimazole 2.5 mg daily but it was discontinued in July of 2019 by her PCP.     Ref. Range 11/19/2019 15:11 4/24/2020 13:07   TSH Latest Ref Range: 0.380 - 5.330 uIU/mL 2.540 2.080   Free T-4 Latest Ref Range: 0.58 - 1.64 ng/dL 0.80 1.00   T3 Latest Ref Range: 60.0 - 181.0 ng/dL 108.2    T3,Free Latest Ref Range: 2.40 - 4.20 pg/mL 3.21    Vitamin D deficiency: she was on Vitamin D 95975 iu per week, states her prescription ran out about a month ago and she has been taking some OTC Vitamin D (she is unsure of the dose)      Ref. Range 11/19/2019 15:11   25-Hydroxy   Vitamin D 25 Latest Ref Range: 30 - 100 ng/mL 7 (L)       Most Recent HbA1c:   Lab Results   Component Value Date/Time    HBA1C 6.2 (H) 11/17/2015 02:15 PM          ROS:  Constitutional: No weight loss  Eyes: bilateral bulging and vision problems as per patient.   Cardiac: No palpitations or racing heart  Resp: shortness of breath while talkin  Neuro: No numbness or tinging in feet  Endo: No heat or cold intolerance, no polyuria or polydipsia  All other systems were reviewed and were negative.    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   • Graves disease 11/24/2019       Past Surgical History:  Past Surgical History:   Procedure Laterality Date   • ACL RECONSTRUCTION SCOPE  7/9/2009    Performed by RENEE LEVI at SURGERY [facility]   • LUMBAR FUSION POSTERIOR  2004   • PB DISKECTOMY,PERCUTANEOUS LUMBAR  2001   • HYSTERECTOMY, VAGINAL  1999   • TUBAL LIGATION  1994   • TONSILLECTOMY  1968       Allergies:  Bee; Diphenoxylate; Penicillins; Soap; Tape; and Tetracycline    Social History:  Social History     Socioeconomic History   • Marital status:      Spouse name: Not on file   • Number of children: Not on file   • Years of education: Not on file   • Highest education level: Not on file   Occupational History   • Not on file   Social Needs   • Financial resource strain: Not on file   • Food insecurity     Worry: Not on file     Inability: Not on file   • Transportation needs     Medical: Not on file     Non-medical: Not on file   Tobacco Use   • Smoking status: Current Every Day Smoker     Packs/day: 1.00     Years: 30.00     Pack years: 30.00     Types: Cigarettes     Start date: 1979   • Smokeless tobacco: Never Used   Substance and Sexual Activity   • Alcohol use: No   • Drug use: No   • Sexual activity: Not Currently   Lifestyle   • Physical activity     Days per week: Not on file     Minutes per session: Not on file   • Stress: Not on file   Relationships   • Social connections     Talks on phone: Not on file     Gets together: Not on file     Attends Buddhist service: Not on file     Active member of club or organization: Not on file     Attends meetings of clubs or organizations: Not on file     Relationship status: Not on file   • Intimate partner violence     Fear of current or ex partner: Not on file     Emotionally abused: Not on file     Physically abused: Not on file     Forced sexual activity: Not on file   Other Topics Concern   • Not on file   Social History Narrative   • Not  on file       Family History:  Family History   Problem Relation Age of Onset   • Cancer Maternal Grandmother         breast    • Cancer Maternal Aunt    • Cancer Mother         mm    • Cancer Father         colon cancer    • Hypertension Sister    • Diabetes Sister    • Cancer Maternal Grandfather         barretts/lung    • Cancer Paternal Grandmother    • Lung Disease Paternal Grandfather    • Cancer Paternal Grandfather         lung   • Cancer Other         hodkins (remission)    • Hypertension Brother    • Diabetes Brother        Medications:    Current Outpatient Medications:   •  benazepril (LOTENSIN) 10 MG Tab, Take 10 mg by mouth every day., Disp: , Rfl:   •  vitamin D, Ergocalciferol, (DRISDOL) 1.25 MG (87260 UT) Cap capsule, Take 1 Cap by mouth every 7 days., Disp: 4 Cap, Rfl: 2  •  pantoprazole (PROTONIX) 40 MG Tablet Delayed Response, Take 40 mg by mouth every day., Disp: , Rfl:   •  Fluticasone-Umeclidin-Vilant (TRELEGY ELLIPTA) 100-62.5-25 MCG/INH AEROSOL POWDER, BREATH ACTIVATED, Inhale 1 Inhalation by mouth every day., Disp: 1 Each, Rfl: 11  •  VENTOLIN  (90 Base) MCG/ACT Aero Soln inhalation aerosol, , Disp: , Rfl: 5  •  benzonatate (TESSALON) 100 MG Cap, Take 1 Cap by mouth 3 times a day as needed for Cough., Disp: 60 Cap, Rfl: 0  •  sumatriptan (IMITREX) 100 MG tablet, Take 1 Tab by mouth Once PRN., Disp: , Rfl: 1  •  dicyclomine (BENTYL) 20 MG Tab, Take 20 mg by mouth every 8 hours as needed., Disp: , Rfl:   •  Krill Oil 500 MG Cap, Take 1 Cap by mouth every day., Disp: , Rfl:   •  aspirin (ASA) 81 MG Chew Tab chewable tablet, Take 81 mg by mouth every day., Disp: , Rfl:   •  Melatonin 10 MG Tab, Take 1-2 Tabs by mouth every bedtime., Disp: , Rfl:   •  Throat Lozenges (COUGH DROPS MT), Spray  in mouth/throat., Disp: , Rfl:   •  loperamide (IMODIUM) 2 MG Cap, Take 2 mg by mouth 4 times a day as needed for Diarrhea., Disp: , Rfl:   •  Aspirin-Acetaminophen-Caffeine (EXCEDRIN MIGRAINE PO),  "Take 1-2 Tabs by mouth Once PRN., Disp: , Rfl:   •  ondansetron (ZOFRAN ODT) 8 MG TABLET DISPERSIBLE, Take 8 mg by mouth every 6 hours as needed., Disp: , Rfl: 6  •  promethazine (PHENERGAN) 25 MG Tab, Take 25 mg by mouth every 6 hours as needed for Nausea/Vomiting., Disp: , Rfl:   •  CloNIDine HCl 0.1 MG TABLET SR 12 HR, Take  by mouth., Disp: , Rfl:   •  EPINEPHRINE HCL INJ, by Injection route., Disp: , Rfl:   •  fluticasone (FLONASE) 50 MCG/ACT nasal spray, Spray 1 Spray in nose every day., Disp: , Rfl:   •  Albuterol (VENTOLIN INH), Inhale  by mouth., Disp: , Rfl:   •  nystatin (MYCOSTATIN) 258051 UNIT/ML Suspension, Take 500,000 Units by mouth 4 times a day., Disp: , Rfl:   •  ofloxacin (OCUFLOX) 0.3 % Solution, Place 1 Drop in both eyes 4 times a day., Disp: , Rfl:   •  albuterol (PROVENTIL) 2.5mg/3ml Nebu Soln solution for nebulization, 3 mL by Nebulization route every four hours as needed for Shortness of Breath., Disp: 75 mL, Rfl: 0  •  CYCLOBENZAPRINE HCL 10 MG PO TABS, Take 1 Tab by mouth every 8 hours as needed., Disp: , Rfl:   •  HYDROCODONE-ACETAMINOPHEN  MG PO TABS, Take 1 Tab by mouth every 8 hours as needed., Disp: , Rfl:   •  Respiratory Therapy Supplies (PULMONEB LT) Device, , Disp: , Rfl: 0    Labs: Reviewed    Physical Examination:  Vital signs: /88   Pulse 91   Temp 36.4 °C (97.5 °F)   Ht 1.727 m (5' 8\")   Wt 106.6 kg (235 lb)   BMI 35.73 kg/m²  Body mass index is 35.73 kg/m².  Phone visit.     Assessment and Plan:  1. History of Graves' disease  Currently in remission; not on methimazole since July 2019. Most recent thyroid labs in April 2020 are normal.     2. Vitamin D deficiency  Recommend vit D 50,000 units once a week with food. Script sent to pharmacy.   Side effects and benefits discussed with patient in detail.       3. Hyperthyroidism  Monitor closely.   - TSH; Future  - TRIIDOTHYRONINE; Future  - T3 FREE; Future  - FREE THYROXINE; Future    4. Vitamin B 12 " deficiency  Recommend over the counter sublingual B 12 - 1000 mcg daily.   Side effects and benefits discussed with patient in detail.     - VITAMIN B12; Future    5. Graves' ophthalmopathy  Referral to neuro-ophthalmology. Saw dr hayden in past.       Return in about 3 months (around 8/6/2020).    Thank you for allowing me to participate in the care of this patient.    Jose Carlos Mejia M.D.  05/06/20    CC:   Duane Gonzales P.A.-C.    This note was created using voice recognition software (Dragon). The accuracy of the dictation is limited by the abilities of the software. I have reviewed the note prior to signing, however some errors in grammar and context are still possible. If you have any questions related to this note please do not hesitate to contact our office.

## 2020-05-08 DIAGNOSIS — E05.00 GRAVES' OPHTHALMOPATHY: ICD-10-CM

## 2020-06-22 ENCOUNTER — TELEPHONE (OUTPATIENT)
Dept: CARDIOLOGY | Facility: MEDICAL CENTER | Age: 60
End: 2020-06-22

## 2020-06-22 NOTE — TELEPHONE ENCOUNTER
Returned call. 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 This involved a treadmill stress test. She explains that she failed the stress test because she became intimidated by all the people in the room, saw that her BP was 230/190, and had a panic attack and stopped the testing. She was put on light duty and told that she needs her doctors approval before returning to work. She does have a copy of [facility] records, so requested that she send those to our office. Provided with our [phone] fax number, and she says she will try to send this today. Will f/u with BE once records received.

## 2020-06-22 NOTE — TELEPHONE ENCOUNTER
PRERNA/emory    Pt calling to discuss getting another stress treadmill test done, BE ordered one in April but pt's b/p was so high (230/190) and pt stopped the test.  Now pt wants to discuss getting it done because it is necessary for pt's work at [employer].      Please call Anette mcneal , (pt states don't use her cell# in chart)

## 2020-06-23 NOTE — TELEPHONE ENCOUNTER
Received records from pt's annual physical, including her stress test, lab results, imaging reports, and their recommendations. It appears that they have requested that pt do more than simply repeat the stress test. They would like her to f/u with her physician to work on correcting risk factors such as BP, BMI, and cholesterol, as well as discuss stress test. Called pt to discuss this. BE does not have availability til September. Scheduled for telephone appt with RT on 6/25. Pt doesn't have the ability to do a virtual visit. Records scanned into media.

## 2020-06-25 ENCOUNTER — TELEMEDICINE (OUTPATIENT)
Dept: CARDIOLOGY | Facility: MEDICAL CENTER | Age: 60
End: 2020-06-25
Payer: COMMERCIAL

## 2020-06-25 VITALS
BODY MASS INDEX: 36.88 KG/M2 | SYSTOLIC BLOOD PRESSURE: 101 MMHG | HEART RATE: 85 BPM | DIASTOLIC BLOOD PRESSURE: 76 MMHG | HEIGHT: 67 IN | WEIGHT: 235 LBS

## 2020-06-25 DIAGNOSIS — I10 ESSENTIAL HYPERTENSION, BENIGN: ICD-10-CM

## 2020-06-25 DIAGNOSIS — E78.5 DYSLIPIDEMIA: ICD-10-CM

## 2020-06-25 PROCEDURE — 99442 PR PHYSICIAN TELEPHONE EVALUATION 11-20 MIN: CPT | Mod: CR | Performed by: NURSE PRACTITIONER

## 2020-06-25 RX ORDER — LOVASTATIN 40 MG/1
TABLET ORAL
COMMUNITY
Start: 2020-05-22 | End: 2020-06-25

## 2020-06-25 RX ORDER — ATORVASTATIN CALCIUM 40 MG/1
40 TABLET, FILM COATED ORAL DAILY
Qty: 90 TAB | Refills: 3 | Status: SHIPPED | OUTPATIENT
Start: 2020-06-25 | End: 2020-08-09

## 2020-06-25 RX ORDER — BENAZEPRIL HYDROCHLORIDE 20 MG/1
20 TABLET ORAL DAILY
Qty: 90 TAB | Refills: 3 | Status: SHIPPED | OUTPATIENT
Start: 2020-06-25 | End: 2020-08-09

## 2020-06-25 RX ORDER — MONTELUKAST SODIUM 10 MG/1
TABLET ORAL
COMMUNITY
Start: 2020-06-03 | End: 2020-08-09

## 2020-06-25 RX ORDER — VITAMIN B COMPLEX
1000 TABLET ORAL DAILY
COMMUNITY
End: 2021-09-10

## 2020-06-25 ASSESSMENT — FIBROSIS 4 INDEX: FIB4 SCORE: 0.93

## 2020-06-25 NOTE — PROGRESS NOTES
Telephone Appointment Visit   As a means of avoiding spread of COVID-19, this visit is being conducted by telephone. This telephone visit was initiated by the patient and they verbally consented.    Time at start of call: 1042    Reason for Call:  follow up hypertension and dysplipidemia     HPI:    60-year-old female with past medical history of hypertension, dyslipidemia, tobacco use, and bruit of right carotid artery.     Last office visit with Dr. Mauricio 6 months ago.  Carotid duplex was obtained which showed mild plaque. Failed the stress test due to hypertension and poor exercising capacity. In which she was not able to pass her annual physical at [facility].     Patient stated she was nervous while getting her treadmill stress test, when she saw her blood pressure get up to 200/120. She told them to stop the test because she thought she was going to have a stroke with the high blood pressure. Since then her pcp has increased her benazepril to 20mg qd. Her blood pressure has been ranging 100-120/80-90.    Labs / Images Reviewed:   Lipid profile   Cholesterol 289    HDL 47    GFR 54    Stress test was done, patient was not able to complete it due to poor exercising capacity and hypertension sbp 200s.     Assessment and Plan:     1. Essential hypertension, benign  - [facility] Treadmill Stress    2. Dyslipidemia  - [facility] Treadmill Stress  - Comp Metabolic Panel; Future  - Lipid Profile; Future    Other orders  - vitamin D (CHOLECALCIFEROL) 1000 Unit (25 mcg) Tab; Take 1,000 Units by mouth every day.  - montelukast (SINGULAIR) 10 MG Tab  - benazepril (LOTENSIN) 20 MG Tab; Take 1 Tab by mouth every day.  Dispense: 90 Tab; Refill: 3  - atorvastatin (LIPITOR) 40 MG Tab; Take 1 Tab by mouth every day.  Dispense: 90 Tab; Refill: 3    1. Hypertension:  - stable   - continue benazepril 20mg qd and clonidine as needed     2. Hyperlipidemia:  - stop lovastatin, start atorvastatin 40mg qd   - ASCVD risk calculator: 8.1%   -  treadmill stress test     Follow-up: in 4 months after testing and labs       Time at end of call: 1459  Total Time Spent: 11-20 minutes    TREMAINE Tate

## 2020-07-13 ENCOUNTER — APPOINTMENT (OUTPATIENT)
Dept: RADIOLOGY | Facility: MEDICAL CENTER | Age: 60
End: 2020-07-13
Attending: PHYSICIAN ASSISTANT
Payer: COMMERCIAL

## 2020-07-27 DIAGNOSIS — E55.9 VITAMIN D DEFICIENCY: ICD-10-CM

## 2020-07-27 RX ORDER — ERGOCALCIFEROL 1.25 MG/1
50000 CAPSULE ORAL
Qty: 4 CAP | Refills: 2 | Status: SHIPPED | OUTPATIENT
Start: 2020-07-27 | End: 2020-08-09

## 2020-07-27 NOTE — TELEPHONE ENCOUNTER
Received request via: Patient    Was the patient seen in the last year in this department? Yes    Does the patient have an active prescription (recently filled or refills available) for medication(s) requested? No     vitamin D, Ergocalciferol, (DRISDOL) 1.25 MG (09640 UT) Cap capsule

## 2020-08-09 ENCOUNTER — HOSPITAL ENCOUNTER (EMERGENCY)
Facility: MEDICAL CENTER | Age: 60
End: 2020-08-09
Attending: EMERGENCY MEDICINE | Admitting: EMERGENCY MEDICINE
Payer: COMMERCIAL

## 2020-08-09 VITALS
OXYGEN SATURATION: 95 % | DIASTOLIC BLOOD PRESSURE: 90 MMHG | WEIGHT: 222.44 LBS | HEIGHT: 68 IN | TEMPERATURE: 98 F | RESPIRATION RATE: 18 BRPM | HEART RATE: 95 BPM | BODY MASS INDEX: 33.71 KG/M2 | SYSTOLIC BLOOD PRESSURE: 160 MMHG

## 2020-08-09 DIAGNOSIS — J01.91 ACUTE RECURRENT SINUSITIS, UNSPECIFIED LOCATION: ICD-10-CM

## 2020-08-09 DIAGNOSIS — R51.9 SINUS HEADACHE: ICD-10-CM

## 2020-08-09 DIAGNOSIS — I10 ESSENTIAL HYPERTENSION: ICD-10-CM

## 2020-08-09 PROCEDURE — A9270 NON-COVERED ITEM OR SERVICE: HCPCS | Performed by: EMERGENCY MEDICINE

## 2020-08-09 PROCEDURE — 700102 HCHG RX REV CODE 250 W/ 637 OVERRIDE(OP): Performed by: EMERGENCY MEDICINE

## 2020-08-09 PROCEDURE — 99283 EMERGENCY DEPT VISIT LOW MDM: CPT

## 2020-08-09 RX ORDER — ERGOCALCIFEROL 1.25 MG/1
50000 CAPSULE ORAL
Status: SHIPPED | COMMUNITY
End: 2020-08-24 | Stop reason: SDUPTHER

## 2020-08-09 RX ORDER — BENAZEPRIL HYDROCHLORIDE 20 MG/1
20 TABLET ORAL DAILY
Qty: 30 TAB | Refills: 0 | Status: SHIPPED | OUTPATIENT
Start: 2020-08-09 | End: 2021-01-26 | Stop reason: SDUPTHER

## 2020-08-09 RX ORDER — AMOXICILLIN AND CLAVULANATE POTASSIUM 875; 125 MG/1; MG/1
1 TABLET, FILM COATED ORAL ONCE
Status: COMPLETED | OUTPATIENT
Start: 2020-08-09 | End: 2020-08-09

## 2020-08-09 RX ORDER — BENAZEPRIL HYDROCHLORIDE 40 MG/1
40 TABLET ORAL ONCE
COMMUNITY
End: 2020-08-09

## 2020-08-09 RX ORDER — DEXAMETHASONE 4 MG/1
2 TABLET ORAL 2 TIMES DAILY
Status: SHIPPED | COMMUNITY
Start: 2020-06-26 | End: 2021-01-26

## 2020-08-09 RX ORDER — AMOXICILLIN AND CLAVULANATE POTASSIUM 875; 125 MG/1; MG/1
1 TABLET, FILM COATED ORAL 2 TIMES DAILY
Qty: 10 TAB | Refills: 0 | Status: SHIPPED | OUTPATIENT
Start: 2020-08-09 | End: 2020-08-14

## 2020-08-09 RX ORDER — ATORVASTATIN CALCIUM 40 MG/1
40 TABLET, FILM COATED ORAL NIGHTLY
Status: SHIPPED | COMMUNITY
End: 2020-10-01 | Stop reason: SDUPTHER

## 2020-08-09 RX ADMIN — AMOXICILLIN AND CLAVULANATE POTASSIUM 1 TABLET: 875; 125 TABLET, FILM COATED ORAL at 17:52

## 2020-08-09 ASSESSMENT — FIBROSIS 4 INDEX: FIB4 SCORE: 0.93

## 2020-08-09 NOTE — ED NOTES
Med rec updated and complete  Allergies reviewed  Pt reports that she ran out of her BENAZEPRIL 20MG about 3 days ago and took one of her old RX BENAZEPRIL 40MG today (8/9/2020) @ 0330.  Pt reports no antibiotics in the last 2 weeks

## 2020-08-10 NOTE — ED PROVIDER NOTES
ED Provider Note    ED Provider Note    Scribed for Eriberto Hernández MD by Eriberto Hernández M.D.. 8/9/2020, 5:34 PM.    Primary care provider: Duane Gonzales P.A.-C.  Means of arrival: Private  History obtained from: Patient  History limited by: None    CHIEF COMPLAINT  Chief Complaint   Patient presents with   • Sinus Pain   • Headache       HPI  Anette Villegas is a 60 y.o. female who presents to the Emergency Department for evaluation of headache.  Patient relates she has had gradually worsening symptoms for the last 2 to 3 days.  Patient notes she is had nasal congestion for the last 1 day with postnasal drip.  Denies a fever.  Pain is retro-orbital, as well as frontal temporal, pressure-like in character and worse with change in position.  Additionally, patient is found to be hypertensive upon arrival but notes she ran out her of her antihypertensives about 4 days ago, she took an order higher dose of medication this morning and on my assessment her blood pressure is already much improved, 104/62.  No chest pain, no acute dyspnea, patient notes no acute cough but is a smoker and notes chronic cough unchanged over the last few days.  No vomiting, she also has pressure to both of the ears and a ringing sensation as well.  No chest pain, no dyspnea.    REVIEW OF SYSTEMS  Pertinent positives include headache that is positional, postnasal drip, hypertension, acute stress. Pertinent negatives include no numbness nor weakness, no acute dyspnea, no fever, no chest pain.      PAST MEDICAL HISTORY   has a past medical history of Arthritis, Chronic airway obstruction, not elsewhere classified, Dental disorder, Grave's disease, Hypertension, IBS (irritable bowel syndrome), Pain, and Personal history of venous thrombosis and embolism (6/23/2009).    SURGICAL HISTORY   has a past surgical history that includes tubal ligation (1994); hysterectomy, vaginal (1999); diskectomy,percutaneous lumbar (2001);  lumbar fusion posterior (2004); tonsillectomy (1968); and acl reconstruction scope (7/9/2009).    SOCIAL HISTORY  Social History     Tobacco Use   • Smoking status: Current Every Day Smoker     Packs/day: 1.00     Years: 30.00     Pack years: 30.00     Types: Cigarettes     Start date: 1979   • Smokeless tobacco: Never Used   Substance Use Topics   • Alcohol use: No   • Drug use: No      Social History     Substance and Sexual Activity   Drug Use No       FAMILY HISTORY  Family History   Problem Relation Age of Onset   • Cancer Maternal Grandmother         breast    • Cancer Maternal Aunt    • Cancer Mother         mm    • Cancer Father         colon cancer    • Hypertension Sister    • Diabetes Sister    • Cancer Maternal Grandfather         barretts/lung    • Cancer Paternal Grandmother    • Lung Disease Paternal Grandfather    • Cancer Paternal Grandfather         lung   • Cancer Other         hodkins (remission)    • Hypertension Brother    • Diabetes Brother        CURRENT MEDICATIONS  Home Medications     Reviewed by Diamond Luque (Pharmacy Tech) on 08/09/20 at 1655  Med List Status: Complete   Medication Last Dose Status   aspirin (ASA) 81 MG Chew Tab chewable tablet 8/9/2020 Active   Aspirin-Acetaminophen-Caffeine (EXCEDRIN MIGRAINE PO) > 1 week Active   atorvastatin (LIPITOR) 40 MG Tab 8/8/2020 Active   benazepril (LOTENSIN) 20 MG Tab >3 days Active   benazepril (LOTENSIN) 40 MG tablet 8/9/2020 Active   CYCLOBENZAPRINE HCL 10 MG PO TABS 8/9/2020 Active   dicyclomine (BENTYL) 20 MG Tab > 1 week Active   FLOVENT  MCG/ACT Aerosol 8/9/2020 Active   fluticasone (FLONASE) 50 MCG/ACT nasal spray 8/9/2020 Active   HYDROCODONE-ACETAMINOPHEN  MG PO TABS 8/9/2020 Active   KRILL OIL PO >1 week Active   loperamide (IMODIUM) 2 MG Cap > 1 week Active   Melatonin 10 MG Tab 8/8/2020 Active   ondansetron (ZOFRAN ODT) 8 MG TABLET DISPERSIBLE 8/9/2020 Active   pantoprazole (PROTONIX) 40 MG Tablet Delayed  "Response 8/9/2020 Active   promethazine (PHENERGAN) 25 MG Tab > 1 week Active   VENTOLIN  (90 Base) MCG/ACT Aero Soln inhalation aerosol 8/9/2020 Active   vitamin D (CHOLECALCIFEROL) 1000 Unit (25 mcg) Tab >1 week Active   vitamin D, Ergocalciferol, (DRISDOL) 1.25 MG (21868 UT) Cap capsule 8/6/2020 Active                ALLERGIES  Allergies   Allergen Reactions   • Bee Anaphylaxis   • Diphenoxylate Rash     .   • Penicillins Shortness of Breath   • Soap Hives   • Tape Rash     .   • Tetracycline Hives and Shortness of Breath       PHYSICAL EXAM  VITAL SIGNS: BP (!) 161/93   Pulse 100   Temp 36.5 °C (97.7 °F) (Temporal)   Resp 20   Ht 1.727 m (5' 8\")   Wt 100.9 kg (222 lb 7.1 oz)   SpO2 94%   BMI 33.82 kg/m²     General: Alert, no acute distress  Skin: Warm, dry, normal for ethnicity  Head: Normocephalic, atraumatic  Neck: Trachea midline, no tenderness  Eye: PERRL, normal conjunctiva, extraocular movements are intact without nystagmus.  ENMT: Oral mucosa moist, no pharyngeal erythema but moderate postnasal drip noted.  Nasal turbinates are unremarkable with no janay rhinorrhea.  Tenderness to both the frontal and maxillary sinuses on percussion.  Dullness of the right tympanic membrane without erythema  Cardiovascular: Regular rate and rhythm, No murmur, Normal peripheral perfusion  Respiratory: Lungs CTA, respirations are non-labored, breath sounds are equal  Musculoskeletal: No swelling, no deformity  Neurological: Alert and oriented to person, place, time, and situation.  Cranial nerves II through XII are grossly intact.  Upper and lower extremity strength and sensation are 5 x 5 and symmetrical bilaterally, no pronator drift.  Lymphatics: Right cervical lymphadenopathy.  Psychiatric: Cooperative, appropriate mood & affect        COURSE & MEDICAL DECISION MAKING  Pertinent Labs & Imaging studies reviewed. (See chart for details)    5:34 PM - Patient seen and examined at bedside. Patient will be " "treated with Augmentin as she notes she has tolerated this well before.  The differential diagnoses include but are not limited to: Sinusitis, sinus headache, essential hypertension with medication noncompliance    Patient Vitals for the past 24 hrs:   BP Temp Temp src Pulse Resp SpO2 Height Weight   08/09/20 1602 (!) 161/93 36.5 °C (97.7 °F) Temporal 100 20 94 % -- --   08/09/20 1600 -- -- -- -- -- -- 1.727 m (5' 8\") 100.9 kg (222 lb 7.1 oz)     HTN/IDDM FOLLOW UP:  The patient has known hypertension and is being followed by their primary care doctor    Decision Making:  This is a 60 y.o. year old female who presents with positional headache with nasal congestion.  On exam she has dull right TM and postnasal drip and tenderness to percussion of the sinuses.  Given increasing symptoms for the last several days antibiotics are indicated, patient does have risk factors including tobacco use and chronic airway obstruction.  She is tolerated Augmentin well in the past.  Hypertension certainly on the differential but blood pressure much improved and she is taken an older prescription of her benazepril at home, she is 104/62 on discharge.  This would not be consistent with hypertensive emergency.  Otherwise she has an unremarkable neuro exam, NIH stroke scale is 0.    The patient will return for new or worsening symptoms and is stable at the time of discharge.    Patient has had high blood pressure while in the emergency department, felt likely secondary to medical condition. Counseled patient to monitor blood pressure at home and follow up with primary care physician.     DISPOSITION:  Patient will be discharged home in stable condition.    FOLLOW UP:  Rubén Chicas M.D.  [address] [place] [id number]  [phone]    Schedule an appointment as soon as possible for a visit in 3 days        OUTPATIENT MEDICATIONS:  New Prescriptions    AMOXICILLIN-CLAVULANATE (AUGMENTIN) 875-125 MG TAB    Take 1 Tab by " mouth 2 times a day for 5 days.    BENAZEPRIL (LOTENSIN) 20 MG TAB    Take 1 Tab by mouth every day.         FINAL IMPRESSION  1. Sinus headache    2. Acute recurrent sinusitis, unspecified location    3. Essential hypertension          Eriberto MENDEZ M.D. (Scribe), am scribing for, and in the presence of, Eriberto Hernández MD.    Electronically signed by: Eriberto Hernández M.D. (Scribe), 8/9/2020    Eriberto MENDEZ MD personally performed the services described in this documentation, as scribed by Eriberto Hernández M.D. in my presence, and it is both accurate and complete    The note accurately reflects work and decisions made by me.  Eriberto Hernández M.D.  8/9/2020  5:50 PM

## 2020-08-10 NOTE — ED NOTES
Patient refusing to wait 15 min after Augmentin administration for reaction. Patient verbalized understanding to plan of care and discharge information. Patient in stable condition. No signs of distress. Patient pain free. Patient ambulatory out of ED to personal vehicle with stable gait by self.

## 2020-08-20 ENCOUNTER — TELEPHONE (OUTPATIENT)
Dept: CARDIOLOGY | Facility: MEDICAL CENTER | Age: 60
End: 2020-08-20

## 2020-08-20 NOTE — TELEPHONE ENCOUNTER
Treadmill test- Chart Prep    Name: Anette Villegas MRN: [id number]   Date: 8/6/2020 Status: Can   Appt Time: 1:45 PM Length: 60   Visit Type: TREADMILL [[id number]] Copay: $40.00   Provider: TREADMILL-[facility] B Department: [facility]   Referrals:     Notes: Treadmill per RT (Per RT - wanted Treadmill on 06/30 @ 2:30, was only a 15 min slot)  DX:Essential hypertension, benign,Dyslipidemia   INS:PEBP   Made On:  Change Notes:  Change Notes:  Change Notes:  Change Notes:  Change Notes:  Canceled: 6/25/2020 11:57 AM  6/25/2020 12:09 PM  6/25/2020 12:14 PM  6/25/2020 12:15 PM  6/25/2020 12:15 PM  6/25/2020 12:16 PM  7/24/2020 4:33 PM By:  By:  By:  By:  By:  By:  By: YOLANDA OBRIEN [[id number]] (ES)  YOLANDA OBRIEN [[id number]] (ES)  YOLANDA OBRIEN [[id number]] (ES)  YOLANDA OBRIEN [[id number]] (ES)  YOLANDA OBRIEN [[id number]] (ES)  YOLANDA OBRIEN [[id number]] (ES)  FLAKITO HAGER [[id number]] (ES)   Cancel Rsn: Patient (Patient already had test completed)

## 2020-08-20 NOTE — TELEPHONE ENCOUNTER
Chart Prep    Spoke to patient regarding Lipid and CMP order needing to be complete before FV with RT. Patient stated she completed the labs but she does not know where they were completed at. Patient asked if the office could re order the labs at her visit 8/27. I explained we should wait until she is seen and the provider will determine what is necessary from there. Confirmed appt date and time for 8/27 VV at 9am with RT.

## 2020-08-24 DIAGNOSIS — E55.9 VITAMIN D DEFICIENCY: ICD-10-CM

## 2020-08-24 RX ORDER — ERGOCALCIFEROL 1.25 MG/1
50000 CAPSULE ORAL
Qty: 12 CAP | Refills: 3 | Status: SHIPPED | OUTPATIENT
Start: 2020-08-24 | End: 2020-10-01

## 2020-08-27 ENCOUNTER — TELEPHONE (OUTPATIENT)
Dept: CARDIOLOGY | Facility: MEDICAL CENTER | Age: 60
End: 2020-08-27

## 2020-08-27 NOTE — TELEPHONE ENCOUNTER
Sent STAT request fax to [facility] [phone]  for stress test patient said she did on July 14 th 2020.  Patient was scheduled for a virtual visit today with Ruben. After calling patient she said she did not have a way to even do a virtual and would only be able to talk on the phone.  Ruben mentioned her blood pressure was elevated today by patients home cuff as well as no report from [facility] for her stress test.   Rescheduled patient to be seen in office to check blood pressure and request test results.

## 2020-09-24 ENCOUNTER — TELEPHONE (OUTPATIENT)
Dept: CARDIOLOGY | Facility: MEDICAL CENTER | Age: 60
End: 2020-09-24

## 2020-09-24 NOTE — TELEPHONE ENCOUNTER
Chart prep    Attempted to LVM for patient regarding lipid and CMP needing to be completed before Virtual with RT 10/1. Unable to LVM due to VM box being full.

## 2020-10-01 ENCOUNTER — OFFICE VISIT (OUTPATIENT)
Dept: CARDIOLOGY | Facility: MEDICAL CENTER | Age: 60
End: 2020-10-01
Payer: COMMERCIAL

## 2020-10-01 VITALS
HEIGHT: 68 IN | DIASTOLIC BLOOD PRESSURE: 72 MMHG | SYSTOLIC BLOOD PRESSURE: 102 MMHG | OXYGEN SATURATION: 97 % | WEIGHT: 224 LBS | HEART RATE: 88 BPM | BODY MASS INDEX: 33.95 KG/M2

## 2020-10-01 DIAGNOSIS — I10 ESSENTIAL HYPERTENSION, BENIGN: ICD-10-CM

## 2020-10-01 DIAGNOSIS — E78.5 DYSLIPIDEMIA: ICD-10-CM

## 2020-10-01 PROCEDURE — 99213 OFFICE O/P EST LOW 20 MIN: CPT | Performed by: NURSE PRACTITIONER

## 2020-10-01 RX ORDER — CETIRIZINE HYDROCHLORIDE 10 MG/1
10 TABLET ORAL DAILY
COMMUNITY
End: 2021-09-10

## 2020-10-01 RX ORDER — MONTELUKAST SODIUM 10 MG/1
10 TABLET ORAL DAILY
COMMUNITY
End: 2021-09-10

## 2020-10-01 RX ORDER — SUMATRIPTAN 100 MG/1
100 TABLET, FILM COATED ORAL
COMMUNITY

## 2020-10-01 RX ORDER — ATORVASTATIN CALCIUM 40 MG/1
40 TABLET, FILM COATED ORAL DAILY
Qty: 90 TAB | Refills: 3 | Status: SHIPPED | OUTPATIENT
Start: 2020-10-01 | End: 2021-06-03 | Stop reason: SDUPTHER

## 2020-10-01 ASSESSMENT — ENCOUNTER SYMPTOMS
VOMITING: 0
NAUSEA: 0
DIZZINESS: 0
PND: 0
ORTHOPNEA: 0
COUGH: 0
HEMOPTYSIS: 0
PALPITATIONS: 0
SPUTUM PRODUCTION: 0
WHEEZING: 0
CLAUDICATION: 0
WEAKNESS: 0
SHORTNESS OF BREATH: 0

## 2020-10-01 ASSESSMENT — FIBROSIS 4 INDEX: FIB4 SCORE: 0.93

## 2020-10-01 NOTE — PROGRESS NOTES
Chief Complaint   Patient presents with   • HTN (Controlled)     follow up       Subjective:   Anette Villegas is a 60 y.o. female who presents today for hypertension.      past medical history of hypertension, dyslipidemia, tobacco use, and bruit of right carotid artery.     Carotid duplex was obtained which showed mild plaque. Failed the stress test due to hypertension and poor exercising capacity. In which she was not able to pass her annual physical at [facility]. pcp increased her benazepril 20mg qd, blood pressure was stable. Treadmill stress test was normal.    Interim events:  Patient is doing well. Denies any chest pain, sob, dizziness or palpitations.       Past Medical History:   Diagnosis Date   • Arthritis    • Chronic airway obstruction, not elsewhere classified    • Dental disorder     dentures   • Grave's disease    • Hypertension    • IBS (irritable bowel syndrome)    • Pain     lower back   • Personal history of venous thrombosis and embolism 6/23/2009    right leg     Past Surgical History:   Procedure Laterality Date   • ACL RECONSTRUCTION SCOPE  7/9/2009    Performed by RENEE LEVI at SURGERY [facility] ORS   • LUMBAR FUSION POSTERIOR  2004   • PB DISKECTOMY,PERCUTANEOUS LUMBAR  2001   • HYSTERECTOMY, VAGINAL  1999   • TUBAL LIGATION  1994   • TONSILLECTOMY  1968     Family History   Problem Relation Age of Onset   • Cancer Maternal Grandmother         breast    • Cancer Maternal Aunt    • Cancer Mother         mm    • Cancer Father         colon cancer    • Hypertension Sister    • Diabetes Sister    • Cancer Maternal Grandfather         barretts/lung    • Cancer Paternal Grandmother    • Lung Disease Paternal Grandfather    • Cancer Paternal Grandfather         lung   • Cancer Other         hodkins (remission)    • Hypertension Brother    • Diabetes Brother      Social History     Socioeconomic History   • Marital status:      Spouse name: Not on file   • Number of  children: Not on file   • Years of education: Not on file   • Highest education level: Not on file   Occupational History   • Not on file   Social Needs   • Financial resource strain: Not on file   • Food insecurity     Worry: Not on file     Inability: Not on file   • Transportation needs     Medical: Not on file     Non-medical: Not on file   Tobacco Use   • Smoking status: Current Every Day Smoker     Packs/day: 1.00     Years: 30.00     Pack years: 30.00     Types: Cigarettes     Start date: 1979   • Smokeless tobacco: Never Used   Substance and Sexual Activity   • Alcohol use: No   • Drug use: No   • Sexual activity: Not Currently   Lifestyle   • Physical activity     Days per week: Not on file     Minutes per session: Not on file   • Stress: Not on file   Relationships   • Social connections     Talks on phone: Not on file     Gets together: Not on file     Attends Hoahaoism service: Not on file     Active member of club or organization: Not on file     Attends meetings of clubs or organizations: Not on file     Relationship status: Not on file   • Intimate partner violence     Fear of current or ex partner: Not on file     Emotionally abused: Not on file     Physically abused: Not on file     Forced sexual activity: Not on file   Other Topics Concern   • Not on file   Social History Narrative   • Not on file     Allergies   Allergen Reactions   • Bee Anaphylaxis   • Diphenoxylate Rash     .   • Penicillins Shortness of Breath   • Soap Hives   • Tape Rash     .   • Tetracycline Hives and Shortness of Breath     Outpatient Encounter Medications as of 10/1/2020   Medication Sig Dispense Refill   • cetirizine (ZYRTEC) 10 MG Tab Take 10 mg by mouth every day.     • montelukast (SINGULAIR) 10 MG Tab Take 10 mg by mouth every day.     • sumatriptan (IMITREX) 100 MG tablet Take 100 mg by mouth Once PRN for Migraine.     • B Complex-C (SUPER B COMPLEX PO) Take  by mouth.     • Cyanocobalamin (VITAMIN B 12 PO) Take  by  mouth.     • Misc Natural Products (OSTEO BI-FLEX JOINT SHIELD PO) Take  by mouth.     • TURMERIC PO Take  by mouth.     • Probiotic Product (PROBIOTIC ADVANCED PO) Take  by mouth.     • COLLAGEN PO Take  by mouth.     • atorvastatin (LIPITOR) 40 MG Tab Take 1 Tab by mouth every day. 90 Tab 3   • KRILL OIL PO Take 1 Cap by mouth every day.     • FLOVENT  MCG/ACT Aerosol Inhale 2 Puffs by mouth 2 Times a Day.     • benazepril (LOTENSIN) 20 MG Tab Take 1 Tab by mouth every day. 30 Tab 0   • vitamin D (CHOLECALCIFEROL) 1000 Unit (25 mcg) Tab Take 1,000 Units by mouth every day.     • pantoprazole (PROTONIX) 40 MG Tablet Delayed Response Take 40 mg by mouth every day.     • VENTOLIN  (90 Base) MCG/ACT Aero Soln inhalation aerosol Inhale 2 Puffs by mouth every four hours as needed for Shortness of Breath.  5   • dicyclomine (BENTYL) 20 MG Tab Take 20 mg by mouth every 8 hours as needed (For cramps).     • aspirin (ASA) 81 MG Chew Tab chewable tablet Take 81 mg by mouth every day.     • Melatonin 10 MG Tab Take 10 mg by mouth every bedtime.     • Aspirin-Acetaminophen-Caffeine (EXCEDRIN MIGRAINE PO) Take 2 Tabs by mouth Once PRN (For headache).     • ondansetron (ZOFRAN ODT) 8 MG TABLET DISPERSIBLE Take 8 mg by mouth every 6 hours as needed for Nausea.  6   • promethazine (PHENERGAN) 25 MG Tab Take 25 mg by mouth every 6 hours as needed for Nausea/Vomiting.     • fluticasone (FLONASE) 50 MCG/ACT nasal spray Spray 1 Spray in nose every day.     • CYCLOBENZAPRINE HCL 10 MG PO TABS Take 1 Tab by mouth 3 times a day.     • HYDROCODONE-ACETAMINOPHEN  MG PO TABS Take 1 Tab by mouth 3 times a day.     • [DISCONTINUED] vitamin D, Ergocalciferol, (DRISDOL) 1.25 MG (38732 UT) Cap capsule Take 1 Cap by mouth every 7 days. On Thur (Patient not taking: Reported on 8/27/2020) 12 Cap 3   • [DISCONTINUED] atorvastatin (LIPITOR) 40 MG Tab Take 40 mg by mouth every evening.     • [DISCONTINUED] loperamide (IMODIUM) 2 MG  "Cap Take 2 mg by mouth 4 times a day as needed for Diarrhea.       No facility-administered encounter medications on file as of 10/1/2020.      Review of Systems   Constitutional: Negative for malaise/fatigue.   Respiratory: Negative for cough, hemoptysis, sputum production, shortness of breath and wheezing.    Cardiovascular: Negative for chest pain, palpitations, orthopnea, claudication, leg swelling and PND.   Gastrointestinal: Negative for nausea and vomiting.   Neurological: Negative for dizziness and weakness.        Objective:   /72 (BP Location: Left arm, Patient Position: Sitting)   Pulse 88   Ht 1.727 m (5' 8\")   Wt 101.6 kg (224 lb)   SpO2 97%   BMI 34.06 kg/m²     Physical Exam   Constitutional: She is oriented to person, place, and time. She appears well-developed and well-nourished. No distress.   HENT:   Head: Normocephalic and atraumatic.   Eyes: Pupils are equal, round, and reactive to light.   Neck: No JVD present.   Cardiovascular: Normal rate, regular rhythm and normal heart sounds.   No murmur heard.  Pulmonary/Chest: Effort normal and breath sounds normal.   Abdominal: Soft. Bowel sounds are normal. She exhibits no distension.   Musculoskeletal:         General: No edema.   Neurological: She is alert and oriented to person, place, and time.   Skin: Skin is warm and dry. She is not diaphoretic.   Psychiatric: She has a normal mood and affect. Her behavior is normal. Judgment and thought content normal.     Carotid duplex   4/24/2020  1.  There is a mild amount of atherosclerotic plaque.  Plaque is located in carotid bulbs and proximal internal carotid arteries.  Plaque characterization:  heterogeneous     2.  There is no evidence of carotid occlusion.     3. Vertebral arteries demonstrate antegrade flow.     4. Diameter reduction in the internal carotid arteries: less than 50%. There is no hemodynamically significant stenosis.    Lab Results   Component Value Date/Time    CHOLSTRLTOT " 222 (H) 11/17/2015 02:15 PM     (H) 11/17/2015 02:15 PM    HDL 51 11/17/2015 02:15 PM    TRIGLYCERIDE 222 (H) 11/17/2015 02:15 PM       Lab Results   Component Value Date/Time    SODIUM 138 04/24/2020 01:07 PM    POTASSIUM 4.4 04/24/2020 01:07 PM    CHLORIDE 101 04/24/2020 01:07 PM    CO2 22 04/24/2020 01:07 PM    GLUCOSE 110 (H) 04/24/2020 01:07 PM    BUN 19 04/24/2020 01:07 PM    CREATININE 1.31 04/24/2020 01:07 PM     Lab Results   Component Value Date/Time    ALKPHOSPHAT 106 (H) 04/24/2020 01:07 PM    ASTSGOT 17 04/24/2020 01:07 PM    ALTSGPT 16 04/24/2020 01:07 PM    TBILIRUBIN 0.4 04/24/2020 01:07 PM          Assessment:     1. Essential hypertension, benign     2. Dyslipidemia         Medical Decision Making:  Today's Assessment / Status / Plan:     1. Hypertension:  - stable   - continue benazepril 20mg qd     2. Dyslipidemia:  -  repeat labs from lab asif   - continue atorvastatin 40mg qd     ASCVD 10 years score 6.2%   Continue asa and higher statin therapy       Follow up annual for evaluation.

## 2021-01-26 ENCOUNTER — OFFICE VISIT (OUTPATIENT)
Dept: CARDIOLOGY | Facility: MEDICAL CENTER | Age: 61
End: 2021-01-26
Payer: COMMERCIAL

## 2021-01-26 VITALS
WEIGHT: 226 LBS | RESPIRATION RATE: 24 BRPM | HEART RATE: 98 BPM | DIASTOLIC BLOOD PRESSURE: 72 MMHG | BODY MASS INDEX: 34.25 KG/M2 | HEIGHT: 68 IN | SYSTOLIC BLOOD PRESSURE: 140 MMHG | OXYGEN SATURATION: 96 %

## 2021-01-26 DIAGNOSIS — I10 ESSENTIAL HYPERTENSION, BENIGN: ICD-10-CM

## 2021-01-26 DIAGNOSIS — Z72.0 TOBACCO USE: ICD-10-CM

## 2021-01-26 DIAGNOSIS — U07.1 COVID-19: ICD-10-CM

## 2021-01-26 DIAGNOSIS — I70.0 AORTIC ATHEROSCLEROSIS (HCC): ICD-10-CM

## 2021-01-26 DIAGNOSIS — E78.5 DYSLIPIDEMIA: ICD-10-CM

## 2021-01-26 DIAGNOSIS — R06.09 DOE (DYSPNEA ON EXERTION): ICD-10-CM

## 2021-01-26 PROCEDURE — 99406 BEHAV CHNG SMOKING 3-10 MIN: CPT | Performed by: INTERNAL MEDICINE

## 2021-01-26 PROCEDURE — 99214 OFFICE O/P EST MOD 30 MIN: CPT | Mod: 25 | Performed by: INTERNAL MEDICINE

## 2021-01-26 RX ORDER — BENAZEPRIL HYDROCHLORIDE 40 MG/1
40 TABLET ORAL DAILY
Qty: 90 TAB | Refills: 2 | Status: SHIPPED | OUTPATIENT
Start: 2021-01-26 | End: 2021-03-09

## 2021-01-26 RX ORDER — METOPROLOL SUCCINATE 25 MG/1
25 TABLET, EXTENDED RELEASE ORAL DAILY
COMMUNITY
End: 2021-09-15

## 2021-01-26 ASSESSMENT — FIBROSIS 4 INDEX: FIB4 SCORE: 0.93

## 2021-01-27 NOTE — PROGRESS NOTES
CARDIOLOGY OUTPATIENT FOLLOWUP    PCP: Nathan Walters M.D.    1. KRUEGER (dyspnea on exertion)  2. COVID-19  3. Essential hypertension, benign  4. Aortic atherosclerosis (HCC)  5. Dyslipidemia  6. Tobacco use    Anette is having a difficult time with residual dyspnea and fatigue following Covid infection in December.  I recommended obtaining an echocardiogram as well as BNP and troponin level.    Furthermore, I am concerned about multiple uncontrolled cardiovascular risk factors including poorly controlled hypertension, dyslipidemia; dominated by hypertriglyceridemia, probable undiagnosed diabetes and tobacco use.  I counseled her for greater than 3 minutes about the need for tobacco cessation as well as various methods which could be employed to help.  She is interested in Chantix and will discuss obtaining a prescription with her primary care provider.    I also doubled the dose of benazepril and recommended home blood pressure monitoring during a level less than 130/80 on average.  She will contact me in 4 weeks with the results.    I counseled her about low carbohydrate diet.  She should continue on aspirin, atorvastatin.     I do not believe the tachycardia is mediated by cardiovascular disease but felt is reasonable to remain on metoprolol for the time being-at least until blood pressures better controlled    Follow up with Jose Mauricio M.D. in 4 months    Chief Complaint   Patient presents with   • Hypertension       History: Anette Clemens [place] is a 60 y.o. female 60-year-old woman with history of lung disease, tobacco abuse presenting for follow-up of cardiovascular risk factors.  Since her last evaluation she was diagnosed with Covid and has continued to struggle with exertional shortness of breath and fatigue.  She reports several members of her household continue to be sick and she struggles with the guilt of having brought the infection home from her work as a .  She does not  "experience orthopnea or typical angina.    She has not been recording her blood pressure and continues to smoke though is coughing putting the cigarette out half-way through.  She smokes in her from 1/2 pack to 1 packs/day and can go 12 hours without smoking but finds she is dependent on nicotine.  In the past she had success with patches had difficulty with the adhesive.     She reports several changes to her diet which are partly accounted for by Covid-this included eating very little but when eating increased sodium intake.          ROS:   All other systems reviewed and negative except as per the HPI    PE:  /72 (BP Location: Right arm, Patient Position: Sitting, BP Cuff Size: Adult)   Pulse 98   Resp (!) 24   Ht 1.727 m (5' 8\")   Wt 102.5 kg (226 lb)   SpO2 96%   BMI 34.36 kg/m²   Gen: Well appearing  HEENT: Symmetric face. Anicteric sclerae. Moist mucus membranes  NECK: No JVD. No lymphadenopathy  CARDIAC: Normal PMI, regular, normal S1, S2. without murmur    VASCULATURE: Normal carotid amplitude without bruit.   RESP: Clear to auscultation bilaterally  ABD: Soft, non-tender, non-distended  EXT: No edema, no clubbing or cyanosis  SKIN: Warm and dry  NEURO: No gross deficits   PSYCH: Appropriate affect, participates in conversation    Past Medical History:   Diagnosis Date   • Arthritis    • Chronic airway obstruction, not elsewhere classified    • Dental disorder     dentures   • Grave's disease    • Hypertension    • IBS (irritable bowel syndrome)    • Pain     lower back   • Personal history of venous thrombosis and embolism 6/23/2009    right leg     Allergies   Allergen Reactions   • Bee Anaphylaxis   • Diphenoxylate Rash     .   • Penicillins Shortness of Breath   • Soap Hives   • Tape Rash     .   • Tetracycline Hives and Shortness of Breath     Outpatient Encounter Medications as of 1/26/2021   Medication Sig Dispense Refill   • metoprolol SR (TOPROL XL) 25 MG TABLET SR 24 HR Take 25 mg by " mouth every day.     • Budeson-Glycopyrrol-Formoterol (BREZTRI AEROSPHERE) 160-9-4.8 MCG/ACT Aerosol Inhale 2 times a day. 2 puffs twice daily     • benazepril (LOTENSIN) 40 MG tablet Take 1 Tab by mouth every day. 90 Tab 2   • cetirizine (ZYRTEC) 10 MG Tab Take 10 mg by mouth every day.     • montelukast (SINGULAIR) 10 MG Tab Take 10 mg by mouth every day.     • sumatriptan (IMITREX) 100 MG tablet Take 100 mg by mouth Once PRN for Migraine.     • B Complex-C (SUPER B COMPLEX PO) Take  by mouth.     • Cyanocobalamin (VITAMIN B 12 PO) Take  by mouth.     • Misc Natural Products (OSTEO BI-FLEX JOINT SHIELD PO) Take  by mouth.     • TURMERIC PO Take  by mouth.     • Probiotic Product (PROBIOTIC ADVANCED PO) Take  by mouth.     • COLLAGEN PO Take  by mouth.     • atorvastatin (LIPITOR) 40 MG Tab Take 1 Tab by mouth every day. 90 Tab 3   • KRILL OIL PO Take 1 Cap by mouth every day.     • vitamin D (CHOLECALCIFEROL) 1000 Unit (25 mcg) Tab Take 1,000 Units by mouth every day.     • pantoprazole (PROTONIX) 40 MG Tablet Delayed Response Take 40 mg by mouth every day.     • VENTOLIN  (90 Base) MCG/ACT Aero Soln inhalation aerosol Inhale 2 Puffs by mouth every four hours as needed for Shortness of Breath.  5   • dicyclomine (BENTYL) 20 MG Tab Take 20 mg by mouth every 8 hours as needed (For cramps).     • aspirin (ASA) 81 MG Chew Tab chewable tablet Take 81 mg by mouth every day.     • Melatonin 10 MG Tab Take 10 mg by mouth every bedtime.     • Aspirin-Acetaminophen-Caffeine (EXCEDRIN MIGRAINE PO) Take 2 Tabs by mouth Once PRN (For headache).     • ondansetron (ZOFRAN ODT) 8 MG TABLET DISPERSIBLE Take 8 mg by mouth every 6 hours as needed for Nausea.  6   • promethazine (PHENERGAN) 25 MG Tab Take 25 mg by mouth every 6 hours as needed for Nausea/Vomiting.     • fluticasone (FLONASE) 50 MCG/ACT nasal spray Spray 1 Spray in nose every day.     • CYCLOBENZAPRINE HCL 10 MG PO TABS Take 1 Tab by mouth 3 times a day.     •  HYDROCODONE-ACETAMINOPHEN  MG PO TABS Take 1 Tab by mouth 3 times a day.     • [DISCONTINUED] FLOVENT  MCG/ACT Aerosol Inhale 2 Puffs by mouth 2 Times a Day.     • [DISCONTINUED] benazepril (LOTENSIN) 20 MG Tab Take 1 Tab by mouth every day. 30 Tab 0     No facility-administered encounter medications on file as of 1/26/2021.      Social History     Socioeconomic History   • Marital status:      Spouse name: Not on file   • Number of children: Not on file   • Years of education: Not on file   • Highest education level: Not on file   Occupational History   • Not on file   Social Needs   • Financial resource strain: Not on file   • Food insecurity     Worry: Not on file     Inability: Not on file   • Transportation needs     Medical: Not on file     Non-medical: Not on file   Tobacco Use   • Smoking status: Current Every Day Smoker     Packs/day: 1.00     Years: 30.00     Pack years: 30.00     Types: Cigarettes     Start date: 1979   • Smokeless tobacco: Never Used   Substance and Sexual Activity   • Alcohol use: No   • Drug use: No   • Sexual activity: Not Currently   Lifestyle   • Physical activity     Days per week: Not on file     Minutes per session: Not on file   • Stress: Not on file   Relationships   • Social connections     Talks on phone: Not on file     Gets together: Not on file     Attends Spiritism service: Not on file     Active member of club or organization: Not on file     Attends meetings of clubs or organizations: Not on file     Relationship status: Not on file   • Intimate partner violence     Fear of current or ex partner: Not on file     Emotionally abused: Not on file     Physically abused: Not on file     Forced sexual activity: Not on file   Other Topics Concern   • Not on file   Social History Narrative   • Not on file       Studies  Lab Results   Component Value Date/Time    CHOLSTRLTOT 222 (H) 11/17/2015 02:15 PM     (H) 11/17/2015 02:15 PM    HDL 51 11/17/2015  02:15 PM    TRIGLYCERIDE 222 (H) 11/17/2015 02:15 PM       Lab Results   Component Value Date/Time    SODIUM 138 04/24/2020 01:07 PM    POTASSIUM 4.4 04/24/2020 01:07 PM    CHLORIDE 101 04/24/2020 01:07 PM    CO2 22 04/24/2020 01:07 PM    GLUCOSE 110 (H) 04/24/2020 01:07 PM    BUN 19 04/24/2020 01:07 PM    CREATININE 1.31 04/24/2020 01:07 PM     Lab Results   Component Value Date/Time    ALKPHOSPHAT 106 (H) 04/24/2020 01:07 PM    ASTSGOT 17 04/24/2020 01:07 PM    ALTSGPT 16 04/24/2020 01:07 PM    TBILIRUBIN 0.4 04/24/2020 01:07 PM        For this encounter I reviewed the following medical records CBC, BMP and Lipid profile   For this encounter I directly reviewed ECG tracings. . I agree with the interpretation in the EHR.

## 2021-02-09 ENCOUNTER — OFFICE VISIT (OUTPATIENT)
Dept: SLEEP MEDICINE | Facility: MEDICAL CENTER | Age: 61
End: 2021-02-09
Payer: COMMERCIAL

## 2021-02-09 ENCOUNTER — HOSPITAL ENCOUNTER (OUTPATIENT)
Dept: RADIOLOGY | Facility: MEDICAL CENTER | Age: 61
End: 2021-02-09
Attending: INTERNAL MEDICINE
Payer: COMMERCIAL

## 2021-02-09 VITALS
DIASTOLIC BLOOD PRESSURE: 70 MMHG | HEART RATE: 67 BPM | TEMPERATURE: 98.5 F | RESPIRATION RATE: 16 BRPM | WEIGHT: 218 LBS | BODY MASS INDEX: 33.04 KG/M2 | OXYGEN SATURATION: 99 % | SYSTOLIC BLOOD PRESSURE: 116 MMHG | HEIGHT: 68 IN

## 2021-02-09 DIAGNOSIS — J12.82 PNEUMONIA DUE TO COVID-19 VIRUS: ICD-10-CM

## 2021-02-09 DIAGNOSIS — U07.1 PNEUMONIA DUE TO COVID-19 VIRUS: ICD-10-CM

## 2021-02-09 DIAGNOSIS — J45.30 MILD PERSISTENT ASTHMA WITHOUT COMPLICATION: ICD-10-CM

## 2021-02-09 PROCEDURE — 99214 OFFICE O/P EST MOD 30 MIN: CPT | Performed by: INTERNAL MEDICINE

## 2021-02-09 PROCEDURE — 71046 X-RAY EXAM CHEST 2 VIEWS: CPT

## 2021-02-09 ASSESSMENT — FIBROSIS 4 INDEX: FIB4 SCORE: 0.95

## 2021-02-09 ASSESSMENT — PAIN SCALES - GENERAL: PAINLEVEL: NO PAIN

## 2021-02-09 NOTE — PATIENT INSTRUCTIONS
Anette comes in today with a complicated recent history, in December developed Covid pneumonia, has been home and on medical leave for over a month.  She is still recovering, her saturations are somewhat better but she had significant respiratory discomfort, presently medications include respiratory triple inhaler, Singulair oral, and then Ventolin rescue inhaler.    We will need to look at her current x-ray regarding her recent status, make certain there is no lingering pneumonitis.  Her last lung function testing done December a year ago showed moderate COPD mid flows were only 45% and there was hyperinflation.  She does have a smoking history, also the reactive airways, the recent Covid pneumonia and we will update that testing at our next visit here in about 4 weeks.  In the meantime she will continue to recover, she may need to see ENT as empiric treatment of her sinus infection has not resolved her symptoms completely.    We will arrange for a current x-ray, upcoming lung function test and oximetry, work with Dr. Wilson on Anette's possible return to work or inability to return to work that remains to be determined.

## 2021-02-09 NOTE — PROGRESS NOTES
Anette Clemens Nelly is a 61 y.o. female here for Covid pneumonia and reactive airways. Patient was referred by primary care.    History of Present Illness: As follows  Anette comes in today with a complicated recent history, in December developed Covid pneumonia, has been home and on medical leave for over a month.  She is still recovering, her saturations are somewhat better but she had significant respiratory discomfort, presently medications include respiratory triple inhaler, Singulair oral, and then Ventolin rescue inhaler.    We will need to look at her current x-ray regarding her recent status, make certain there is no lingering pneumonitis.  Her last lung function testing done December a year ago showed moderate COPD mid flows were only 45% and there was hyperinflation.  She does have a smoking history, also the reactive airways, the recent Covid pneumonia and we will update that testing at our next visit here in about 4 weeks.  In the meantime she will continue to recover, she may need to see ENT as empiric treatment of her sinus infection has not resolved her symptoms completely.    We will arrange for a current x-ray, upcoming lung function test and oximetry, work with Dr. Wilson on Antete's possible return to work or inability to return to work that remains to be determined.  Constitutional ROS: No unexpected change in weight, No unexplained fevers  Eyes: No change in vision or blurring or double vision  Mouth/Throat ROS: No sore throat, No recent change in voice or hoarseness  Pulmonary ROS: See present history for pertinent positives  Cardiovascular ROS: No chest pain to suggest acute coronary syndrome  Gastrointestinal ROS: No abdominal pain to suggest peptic disease  Musculoskeletal/Extremities ROS: no acute artritis or unusual swelling  Hematologic/Lymphatic ROS: No easy bleeding or unusual lymph node swelling  Neurologic ROS: No new or unusual weakness  Psychiatric ROS: No  hallucinations  Allergic/Immunologic: No  urticaria or allergic rash      Current Outpatient Medications   Medication Sig Dispense Refill   • metoprolol SR (TOPROL XL) 25 MG TABLET SR 24 HR Take 25 mg by mouth every day.     • Budeson-Glycopyrrol-Formoterol (BREZTRI AEROSPHERE) 160-9-4.8 MCG/ACT Aerosol Inhale 2 times a day. 2 puffs twice daily     • benazepril (LOTENSIN) 40 MG tablet Take 1 Tab by mouth every day. 90 Tab 2   • cetirizine (ZYRTEC) 10 MG Tab Take 10 mg by mouth every day.     • montelukast (SINGULAIR) 10 MG Tab Take 10 mg by mouth every day.     • sumatriptan (IMITREX) 100 MG tablet Take 100 mg by mouth Once PRN for Migraine.     • B Complex-C (SUPER B COMPLEX PO) Take  by mouth.     • Cyanocobalamin (VITAMIN B 12 PO) Take  by mouth.     • TURMERIC PO Take  by mouth.     • Probiotic Product (PROBIOTIC ADVANCED PO) Take  by mouth.     • COLLAGEN PO Take  by mouth.     • atorvastatin (LIPITOR) 40 MG Tab Take 1 Tab by mouth every day. 90 Tab 3   • vitamin D (CHOLECALCIFEROL) 1000 Unit (25 mcg) Tab Take 1,000 Units by mouth every day.     • pantoprazole (PROTONIX) 40 MG Tablet Delayed Response Take 40 mg by mouth every day.     • VENTOLIN  (90 Base) MCG/ACT Aero Soln inhalation aerosol Inhale 2 Puffs by mouth every four hours as needed for Shortness of Breath.  5   • aspirin (ASA) 81 MG Chew Tab chewable tablet Take 81 mg by mouth every day.     • Melatonin 10 MG Tab Take 10 mg by mouth every bedtime.     • Aspirin-Acetaminophen-Caffeine (EXCEDRIN MIGRAINE PO) Take 2 Tabs by mouth Once PRN (For headache).     • ondansetron (ZOFRAN ODT) 8 MG TABLET DISPERSIBLE Take 8 mg by mouth every 6 hours as needed for Nausea.  6   • fluticasone (FLONASE) 50 MCG/ACT nasal spray Spray 1 Spray in nose every day.     • Misc Natural Products (OSTEO BI-FLEX JOINT SHIELD PO) Take  by mouth.     • KRILL OIL PO Take 1 Cap by mouth every day.     • dicyclomine (BENTYL) 20 MG Tab Take 20 mg by mouth every 8 hours as  "needed (For cramps).     • promethazine (PHENERGAN) 25 MG Tab Take 25 mg by mouth every 6 hours as needed for Nausea/Vomiting.     • CYCLOBENZAPRINE HCL 10 MG PO TABS Take 1 Tab by mouth 3 times a day.     • HYDROCODONE-ACETAMINOPHEN  MG PO TABS Take 1 Tab by mouth 3 times a day.       No current facility-administered medications for this visit.        Social History     Tobacco Use   • Smoking status: Current Every Day Smoker     Packs/day: 1.00     Years: 30.00     Pack years: 30.00     Types: Cigarettes     Start date: 1979   • Smokeless tobacco: Never Used   Substance Use Topics   • Alcohol use: No   • Drug use: No        Past Medical History:   Diagnosis Date   • Arthritis    • Chronic airway obstruction, not elsewhere classified    • Dental disorder     dentures   • Grave's disease    • Hypertension    • IBS (irritable bowel syndrome)    • Pain     lower back   • Personal history of venous thrombosis and embolism 6/23/2009    right leg       Past Surgical History:   Procedure Laterality Date   • ACL RECONSTRUCTION SCOPE  7/9/2009    Performed by RENEE LEVI at [facility] ORS   • LUMBAR FUSION POSTERIOR  2004   • PB DISKECTOMY,PERCUTANEOUS LUMBAR  2001   • HYSTERECTOMY, VAGINAL  1999   • TUBAL LIGATION  1994   • TONSILLECTOMY  1968       Allergies: Bee, Diphenoxylate, Penicillins, Soap, Tape, and Tetracycline    Family History   Problem Relation Age of Onset   • Cancer Maternal Grandmother         breast    • Cancer Maternal Aunt    • Cancer Mother         mm    • Cancer Father         colon cancer    • Hypertension Sister    • Diabetes Sister    • Cancer Maternal Grandfather         barretts/lung    • Cancer Paternal Grandmother    • Lung Disease Paternal Grandfather    • Cancer Paternal Grandfather         lung   • Cancer Other         hodkins (remission)    • Hypertension Brother    • Diabetes Brother        Physical Examination    Vitals:    02/09/21 0845   Height: 1.727 m (5' 8\") "   Weight: 98.9 kg (218 lb)   Weight % change since last entry.: 0 %   BP: 116/70   Pulse: 67   BMI (Calculated): 33.15   Resp: 16   Temp: 36.9 °C (98.5 °F)   TempSrc: Temporal       General Appearance: alert, no distress  Skin: Skin color, texture, turgor normal. No rashes or lesions.  Eyes: negative  Oropharynx: Lips, mucosa, and tongue normal. gums normal. Oropharynx moist and without lesion  Lungs: positive findings: Diminished breath sounds, expiratory wheeze  Heart: negative. RRR without murmur, gallop, or rubs.  No ectopy.  Abdomen: Abdomen soft, non-tender. . No masses,  No organomegaly  Extremities:  No deformities, edema, or skin discoloration  Joints: No acute arthritis  Peripheral Pulses:perfused  Neurologic: intact grossly  No clubbing or cyanosis    II (soft palate, uvula, fauces visible)    Imaging: X-ray ordered    PFTS: Next visit      Assessment and Plan  1. Mild persistent asthma without complication  Extensive inhalers, rescue inhaler and triple inhaler, also oral Singulair  - DX-CHEST-2 VIEWS; Future  - Exercise Test for Bronchospasm / 6-Minute Walk; Future  - PULMONARY FUNCTION TESTS -Test requested: Complete Pulmonary Function Test; Future    2. Pneumonia due to COVID-19 virus  December 18, 2020  - DX-CHEST-2 VIEWS; Future      See detailed orders  Followup Return in about 4 weeks (around 3/9/2021) for follow up visit with Dr. Mariajose Reyez, with PFT, with 6 minute walk.

## 2021-02-16 ENCOUNTER — OFFICE VISIT (OUTPATIENT)
Dept: CARDIOLOGY | Facility: MEDICAL CENTER | Age: 61
End: 2021-02-16
Payer: COMMERCIAL

## 2021-02-16 VITALS
HEART RATE: 96 BPM | DIASTOLIC BLOOD PRESSURE: 82 MMHG | HEIGHT: 68 IN | OXYGEN SATURATION: 96 % | SYSTOLIC BLOOD PRESSURE: 152 MMHG | BODY MASS INDEX: 34.01 KG/M2 | WEIGHT: 224.4 LBS

## 2021-02-16 DIAGNOSIS — I10 ESSENTIAL HYPERTENSION, BENIGN: ICD-10-CM

## 2021-02-16 DIAGNOSIS — E78.5 DYSLIPIDEMIA: ICD-10-CM

## 2021-02-16 DIAGNOSIS — R73.03 PREDIABETES: ICD-10-CM

## 2021-02-16 DIAGNOSIS — R06.09 DYSPNEA ON EXERTION: ICD-10-CM

## 2021-02-16 PROCEDURE — 99214 OFFICE O/P EST MOD 30 MIN: CPT | Performed by: NURSE PRACTITIONER

## 2021-02-16 RX ORDER — AMLODIPINE BESYLATE 5 MG/1
5 TABLET ORAL DAILY
Qty: 90 TABLET | Refills: 1 | Status: SHIPPED | OUTPATIENT
Start: 2021-02-16 | End: 2021-03-09

## 2021-02-16 ASSESSMENT — ENCOUNTER SYMPTOMS
DIZZINESS: 0
SPUTUM PRODUCTION: 0
WHEEZING: 0
VOMITING: 0
HEMOPTYSIS: 0
PALPITATIONS: 0
SHORTNESS OF BREATH: 1
BACK PAIN: 1
CLAUDICATION: 0
WEAKNESS: 0
COUGH: 0
ORTHOPNEA: 0
NERVOUS/ANXIOUS: 0
PND: 0
NAUSEA: 0

## 2021-02-16 ASSESSMENT — FIBROSIS 4 INDEX: FIB4 SCORE: 0.95

## 2021-02-16 NOTE — PROGRESS NOTES
Chief Complaint   Patient presents with   • HTN (Controlled)       Subjective:   Anette Villegas is a 60 y.o. female who presents today for hypertension.      past medical history of hypertension, dyslipidemia, tobacco use, COVID in December 2020, and bruit of right carotid artery.     Interim events:  Patient had covid in December. Post covid, residual dyspnea.  Mild seen by Dr. Mauricio in January 20201. Echo and benazepril was increased due to uncontrolled blood pressure.     Patient continues to have dyspnea on exertion. Echo is schedule beginning of march. She has noticed her blood pressure has been averaging sbp 130-150s.     Denies any chest pain, dizziness, edema or palpitations.       Past Medical History:   Diagnosis Date   • Arthritis    • Chronic airway obstruction, not elsewhere classified    • Dental disorder     dentures   • Grave's disease    • Hypertension    • IBS (irritable bowel syndrome)    • Pain     lower back   • Personal history of venous thrombosis and embolism 6/23/2009    right leg     Past Surgical History:   Procedure Laterality Date   • ACL RECONSTRUCTION SCOPE  7/9/2009    Performed by RENEE LEVI at SURGERY [facility]   • LUMBAR FUSION POSTERIOR  2004   • PB DISKECTOMY,PERCUTANEOUS LUMBAR  2001   • HYSTERECTOMY, VAGINAL  1999   • TUBAL LIGATION  1994   • TONSILLECTOMY  1968     Family History   Problem Relation Age of Onset   • Cancer Maternal Grandmother         breast    • Cancer Maternal Aunt    • Cancer Mother         mm    • Cancer Father         colon cancer    • Hypertension Sister    • Diabetes Sister    • Cancer Maternal Grandfather         barretts/lung    • Cancer Paternal Grandmother    • Lung Disease Paternal Grandfather    • Cancer Paternal Grandfather         lung   • Cancer Other         hodkins (remission)    • Hypertension Brother    • Diabetes Brother      Social History     Socioeconomic History   • Marital status:      Spouse name: Not on  file   • Number of children: Not on file   • Years of education: Not on file   • Highest education level: Not on file   Occupational History   • Not on file   Tobacco Use   • Smoking status: Current Every Day Smoker     Packs/day: 1.00     Years: 30.00     Pack years: 30.00     Types: Cigarettes     Start date: 1979   • Smokeless tobacco: Never Used   Substance and Sexual Activity   • Alcohol use: No   • Drug use: No   • Sexual activity: Not Currently   Other Topics Concern   • Not on file   Social History Narrative   • Not on file     Social Determinants of Health     Financial Resource Strain:    • Difficulty of Paying Living Expenses:    Food Insecurity:    • Worried About Running Out of Food in the Last Year:    • Ran Out of Food in the Last Year:    Transportation Needs:    • Lack of Transportation (Medical):    • Lack of Transportation (Non-Medical):    Physical Activity:    • Days of Exercise per Week:    • Minutes of Exercise per Session:    Stress:    • Feeling of Stress :    Social Connections:    • Frequency of Communication with Friends and Family:    • Frequency of Social Gatherings with Friends and Family:    • Attends Taoism Services:    • Active Member of Clubs or Organizations:    • Attends Club or Organization Meetings:    • Marital Status:    Intimate Partner Violence:    • Fear of Current or Ex-Partner:    • Emotionally Abused:    • Physically Abused:    • Sexually Abused:      Allergies   Allergen Reactions   • Bee Anaphylaxis   • Diphenoxylate Rash     .   • Penicillins Shortness of Breath   • Soap Hives   • Tape Rash     .   • Tetracycline Hives and Shortness of Breath     Outpatient Encounter Medications as of 2/16/2021   Medication Sig Dispense Refill   • amLODIPine (NORVASC) 5 MG Tab Take 1 tablet by mouth every day. 90 tablet 1   • metoprolol SR (TOPROL XL) 25 MG TABLET SR 24 HR Take 25 mg by mouth every day.     • Budeson-Glycopyrrol-Formoterol (BREZTRI AEROSPHERE) 160-9-4.8 MCG/ACT  Aerosol Inhale 2 times a day. 2 puffs twice daily     • benazepril (LOTENSIN) 40 MG tablet Take 1 Tab by mouth every day. 90 Tab 2   • cetirizine (ZYRTEC) 10 MG Tab Take 10 mg by mouth every day.     • montelukast (SINGULAIR) 10 MG Tab Take 10 mg by mouth every day.     • sumatriptan (IMITREX) 100 MG tablet Take 100 mg by mouth Once PRN for Migraine.     • B Complex-C (SUPER B COMPLEX PO) Take  by mouth.     • Cyanocobalamin (VITAMIN B 12 PO) Take  by mouth.     • Misc Natural Products (OSTEO BI-FLEX JOINT SHIELD PO) Take  by mouth.     • TURMERIC PO Take  by mouth.     • Probiotic Product (PROBIOTIC ADVANCED PO) Take  by mouth.     • COLLAGEN PO Take  by mouth.     • atorvastatin (LIPITOR) 40 MG Tab Take 1 Tab by mouth every day. 90 Tab 3   • KRILL OIL PO Take 1 Cap by mouth every day.     • vitamin D (CHOLECALCIFEROL) 1000 Unit (25 mcg) Tab Take 1,000 Units by mouth every day.     • pantoprazole (PROTONIX) 40 MG Tablet Delayed Response Take 40 mg by mouth every day.     • VENTOLIN  (90 Base) MCG/ACT Aero Soln inhalation aerosol Inhale 2 Puffs by mouth every four hours as needed for Shortness of Breath.  5   • dicyclomine (BENTYL) 20 MG Tab Take 20 mg by mouth every 8 hours as needed (For cramps).     • aspirin (ASA) 81 MG Chew Tab chewable tablet Take 81 mg by mouth every day.     • Melatonin 10 MG Tab Take 10 mg by mouth every bedtime.     • Aspirin-Acetaminophen-Caffeine (EXCEDRIN MIGRAINE PO) Take 2 Tabs by mouth Once PRN (For headache).     • ondansetron (ZOFRAN ODT) 8 MG TABLET DISPERSIBLE Take 8 mg by mouth every 6 hours as needed for Nausea.  6   • promethazine (PHENERGAN) 25 MG Tab Take 25 mg by mouth every 6 hours as needed for Nausea/Vomiting.     • fluticasone (FLONASE) 50 MCG/ACT nasal spray Spray 1 Spray in nose every day.     • CYCLOBENZAPRINE HCL 10 MG PO TABS Take 1 Tab by mouth 3 times a day.     • HYDROCODONE-ACETAMINOPHEN  MG PO TABS Take 1 Tab by mouth 3 times a day.       No  "facility-administered encounter medications on file as of 2/16/2021.     Review of Systems   Constitutional: Negative for malaise/fatigue.   HENT: Negative for hearing loss.    Respiratory: Positive for shortness of breath. Negative for cough, hemoptysis, sputum production and wheezing.    Cardiovascular: Negative for chest pain, palpitations, orthopnea, claudication, leg swelling and PND.   Gastrointestinal: Negative for nausea and vomiting.   Musculoskeletal: Positive for back pain and joint pain.   Neurological: Negative for dizziness and weakness.   Psychiatric/Behavioral: The patient is not nervous/anxious.         Objective:   /82 (BP Location: Left arm, Patient Position: Sitting, BP Cuff Size: Adult)   Pulse 96   Ht 1.727 m (5' 8\")   Wt 102 kg (224 lb 6.4 oz)   SpO2 96%   BMI 34.12 kg/m²     Physical Exam   Constitutional: She is oriented to person, place, and time. She appears well-developed and well-nourished. No distress.   HENT:   Head: Normocephalic and atraumatic.   Eyes: Pupils are equal, round, and reactive to light.   Neck: No JVD present.   Cardiovascular: Normal rate, regular rhythm and normal heart sounds.   No murmur heard.  Pulmonary/Chest: Effort normal. No respiratory distress. She has decreased breath sounds.   Abdominal: Soft. Bowel sounds are normal. She exhibits no distension.   Musculoskeletal:         General: No edema.   Neurological: She is alert and oriented to person, place, and time.   Skin: Skin is warm and dry. She is not diaphoretic.   Psychiatric: She has a normal mood and affect. Her behavior is normal. Judgment and thought content normal.     Carotid duplex   4/24/2020  1.  There is a mild amount of atherosclerotic plaque.  Plaque is located in carotid bulbs and proximal internal carotid arteries.  Plaque characterization:  heterogeneous     2.  There is no evidence of carotid occlusion.     3. Vertebral arteries demonstrate antegrade flow.     4. Diameter reduction " in the internal carotid arteries: less than 50%. There is no hemodynamically significant stenosis.    Lab Results   Component Value Date/Time    CHOLSTRLTOT 222 (H) 11/17/2015 02:15 PM     (H) 11/17/2015 02:15 PM    HDL 51 11/17/2015 02:15 PM    TRIGLYCERIDE 222 (H) 11/17/2015 02:15 PM       Lab Results   Component Value Date/Time    SODIUM 138 04/24/2020 01:07 PM    POTASSIUM 4.4 04/24/2020 01:07 PM    CHLORIDE 101 04/24/2020 01:07 PM    CO2 22 04/24/2020 01:07 PM    GLUCOSE 110 (H) 04/24/2020 01:07 PM    BUN 19 04/24/2020 01:07 PM    CREATININE 1.31 04/24/2020 01:07 PM     Lab Results   Component Value Date/Time    ALKPHOSPHAT 106 (H) 04/24/2020 01:07 PM    ASTSGOT 17 04/24/2020 01:07 PM    ALTSGPT 16 04/24/2020 01:07 PM    TBILIRUBIN 0.4 04/24/2020 01:07 PM          Assessment:     1. Essential hypertension, benign     2. Dyslipidemia     3. Prediabetes     4. Dyspnea on exertion         Medical Decision Making:  Today's Assessment / Status / Plan:     1. Hypertension:  - elevated   - added amlodipine 5mg qd   - continue benazepril 20mg qd  - continue metoprolol 25mg qd      2. Dyslipidemia:  - continue atorvastatin 40mg qd   ASCVD 10 years score 6.2%   Continue asa and higher statin therapy     3. Prediabetes:  - AIC 6.4, she will make adjustment to her diet and start exercising.    4. Dyspnea on exertion:  - ECHO ordered and pending     5. Tobacco abuse;  - discussed     Follow up in 1 month after ECHO and blood pressure check.

## 2021-02-17 ENCOUNTER — TELEPHONE (OUTPATIENT)
Dept: CARDIOLOGY | Facility: MEDICAL CENTER | Age: 61
End: 2021-02-17

## 2021-02-17 NOTE — TELEPHONE ENCOUNTER
To Ruben MAHAJAN: Different readings from right and left arm, do you want her to start amlodipine 5mg/daily still or wait until after UEUS next week?    Spoke to patient and she states the BP difference between arms has been happening for a while now, and she has an upper extremity US appointment on 2/24/21. She wants to know if she should start the new BP medication or not. She denies any symptoms of high or low BP.     Today's readings:  Left arm 109/69 HR 74s  Right arm 166/88 HR 74

## 2021-02-17 NOTE — TELEPHONE ENCOUNTER
RT    Patient called to let us know that she gets different BP's as she may have blockage in her Right arm. Last night at 9:30 pm her BP in R arm was 165/90 and on the L arm was 87/73. She just wants to make sure that the medication change is still ok to do. She also has her US scheduled on 2-. Please call her at [phone].    Thank you,    Naomi BONILLA

## 2021-02-18 NOTE — TELEPHONE ENCOUNTER
RE:  Received: Today  Message Contents   Redet TREMAINE Walsl R.N.   Caller: Unspecified (Yesterday, 11:38 AM)   Let’s wait on the medication until she get ultrasound done.     Thank you   Redet   -----------------------------------------------------  Notified patient. She will call us to re-evaluate once she has her US results

## 2021-02-24 ENCOUNTER — HOSPITAL ENCOUNTER (OUTPATIENT)
Dept: RADIOLOGY | Facility: MEDICAL CENTER | Age: 61
End: 2021-02-24
Attending: PHYSICIAN ASSISTANT
Payer: COMMERCIAL

## 2021-02-24 DIAGNOSIS — R09.89 PULSE PRESSURE DECREASE: ICD-10-CM

## 2021-02-24 PROCEDURE — 93930 UPPER EXTREMITY STUDY: CPT

## 2021-02-25 PROCEDURE — 93930 UPPER EXTREMITY STUDY: CPT | Mod: 26 | Performed by: INTERNAL MEDICINE

## 2021-02-25 NOTE — TELEPHONE ENCOUNTER
Results of Upper Extremity US:     >70% Right subclavian artery stenosis and occlusion of proximal left    subclavian artery is likely present    Should patient begin the BP meds that Redet ordered on 2/6?  She is not back in the office for over a week.  Thanks Dr. Mauricio-

## 2021-02-25 NOTE — TELEPHONE ENCOUNTER
RT    Pt called to let us know that she has completed her Ultra Sound and wants to know if it can be reviewed. Pt wants to know if she should come in for an appt depending on the results. Please call back at [phone].    Thank you.

## 2021-02-26 NOTE — TELEPHONE ENCOUNTER
She will do as advised and schedule a f/v in 2-4 weeks.  She will monitor her HR and BP and symptoms (if any) and let us know in the meantime if she is concerned.

## 2021-02-26 NOTE — TELEPHONE ENCOUNTER
Per Dr. Mauricio at 4:32 last evening:    I would initiate the medication that Redet prescribed.  Unfortunately have a difficult time measuring the blood pressure accurately.  She should rely on symptoms of orthostasis-and as such should return 2 to 4 weeks after starting the medication..

## 2021-03-01 ENCOUNTER — TELEPHONE (OUTPATIENT)
Dept: CARDIOLOGY | Facility: MEDICAL CENTER | Age: 61
End: 2021-03-01

## 2021-03-01 NOTE — TELEPHONE ENCOUNTER
Spoke to pt. She is confused about meds. She didn't realize that she was supposed to start Amlodipine 5mg QD. Instead she increased Toprol to 50mg QD.   Pt. Will  Amlodipine and reduce Toprol back to 25mg QD. She also takes Benazepril 40mg QD. Most recent BP: 80948 left, 118/71 right.  To be discussed further in [facility] next week with Dr. Mauricio.

## 2021-03-01 NOTE — TELEPHONE ENCOUNTER
RT    Patient called and needs her refills for metoprolol SR (TOPROL XL) 25 MG TABLET SR 24 HR and benazepril (LOTENSIN) 40 MG tablet be sent to her local pharmacy in [place]. Pharmacy is on file, the original RX was sent to [facility] in [place]. She also stated that the Metoprolol was raised to 50 mg. Please call patient with any questions.      Thank you,    Naomi BONILLA

## 2021-03-04 ENCOUNTER — HOSPITAL ENCOUNTER (OUTPATIENT)
Dept: CARDIOLOGY | Facility: MEDICAL CENTER | Age: 61
End: 2021-03-04
Attending: INTERNAL MEDICINE
Payer: COMMERCIAL

## 2021-03-04 DIAGNOSIS — R06.09 DOE (DYSPNEA ON EXERTION): ICD-10-CM

## 2021-03-04 DIAGNOSIS — I70.0 AORTIC ATHEROSCLEROSIS (HCC): ICD-10-CM

## 2021-03-04 LAB
LV EJECT FRACT  99904: 75
LV EJECT FRACT MOD 2C 99903: 82.48
LV EJECT FRACT MOD 4C 99902: 76.93
LV EJECT FRACT MOD BP 99901: 79.82

## 2021-03-04 PROCEDURE — 93306 TTE W/DOPPLER COMPLETE: CPT

## 2021-03-04 PROCEDURE — 93306 TTE W/DOPPLER COMPLETE: CPT | Mod: 26 | Performed by: INTERNAL MEDICINE

## 2021-03-09 ENCOUNTER — OFFICE VISIT (OUTPATIENT)
Dept: CARDIOLOGY | Facility: MEDICAL CENTER | Age: 61
End: 2021-03-09
Payer: COMMERCIAL

## 2021-03-09 VITALS
RESPIRATION RATE: 16 BRPM | HEART RATE: 80 BPM | WEIGHT: 223 LBS | BODY MASS INDEX: 33.8 KG/M2 | OXYGEN SATURATION: 96 % | SYSTOLIC BLOOD PRESSURE: 144 MMHG | HEIGHT: 68 IN | DIASTOLIC BLOOD PRESSURE: 80 MMHG

## 2021-03-09 DIAGNOSIS — U07.1 COVID-19 VIRUS INFECTION: ICD-10-CM

## 2021-03-09 DIAGNOSIS — I73.9 PAD (PERIPHERAL ARTERY DISEASE) (HCC): ICD-10-CM

## 2021-03-09 DIAGNOSIS — I10 ESSENTIAL HYPERTENSION, BENIGN: ICD-10-CM

## 2021-03-09 DIAGNOSIS — Z01.810 PREOPERATIVE CARDIOVASCULAR EXAMINATION: ICD-10-CM

## 2021-03-09 DIAGNOSIS — Z72.0 TOBACCO USE: ICD-10-CM

## 2021-03-09 PROCEDURE — 99214 OFFICE O/P EST MOD 30 MIN: CPT | Mod: 25 | Performed by: INTERNAL MEDICINE

## 2021-03-09 PROCEDURE — 99406 BEHAV CHNG SMOKING 3-10 MIN: CPT | Performed by: INTERNAL MEDICINE

## 2021-03-09 RX ORDER — OLMESARTAN MEDOXOMIL, AMLODIPINE AND HYDROCHLOROTHIAZIDE TABLET 20/5/12.5 MG 20; 5; 12.5 MG/1; MG/1; MG/1
1 TABLET ORAL DAILY
Qty: 90 TABLET | Refills: 3 | Status: SHIPPED | OUTPATIENT
Start: 2021-03-09 | End: 2021-09-10 | Stop reason: SDUPTHER

## 2021-03-09 ASSESSMENT — FIBROSIS 4 INDEX: FIB4 SCORE: 0.95

## 2021-03-09 NOTE — PROGRESS NOTES
CARDIOLOGY OUTPATIENT FOLLOWUP    PCP: Nathan Walters M.D.    1. Essential hypertension, benign    2. Preoperative cardiovascular examination    3. PAD (peripheral artery disease) (HCC)    4. Tobacco use    5. COVID-19 virus infection        Anette Yost has ongoing dyspnea on exertion but no typical cardiovascular symptoms.  The echocardiogram was normal.  I doubt her symptoms are ischemic and recommend expectant management for the time being.  She has reasonable exertional capacity and I think it is appropriate to proceed evaluation of her back pain including sedation as needed for MRI.  I remain concerned about uncontrolled cardiovascular risk factors, in particular poorly controlled hypertension which is impossible to accurately measure given the bilateral subclavian artery stenosis.  I highlighted the need for smoking cessation and counseled her for 4 minutes regarding techniques and methods which she is receptive to.  Referred to vascular surgery, as I believe revascularization of the right subclavian artery could be very helpful in managing Anette's hypertension.    She should continue on aspirin and atorvastatin.  I also prescribed half dose Tribenzor in place of Lotensin.  She will measure basic metabolic panel in 2 weeks    Follow up: in 3 months    Chief Complaint   Patient presents with   • Shortness of Breath     F/V DX: KRUEGER (dyspnea on exertion)       History: Anette Yost is a 61 y.o. female past medical history of lung disease, tobacco abuse presenting for follow-up of cardiovascular disease.  She had Covid in December and has reported ongoing exertional shortness of breath since that time.  The cardiac evaluation included an echocardiogram which was normal.  She recently completed a duplex study of the upper extremity vasculature which demonstrated occlusion of the left subclavian and severe stenosis of the right which explains the bruit in the discrepant blood pressures.   "For some reason she discontinued amlodipine recently and has been escalating doses of Lotensin.  She has had no hypotensive symptoms.  She continues to smoke but is down to 10 cigarettes daily.  She is primarily bothered by low back pain and hopes to have an MRI with sedation      ROS:  All other systems reviewed and negative except as per the HPI    PE:  /80 (BP Location: Right arm, Patient Position: Sitting, BP Cuff Size: Adult)   Pulse 80   Resp 16   Ht 1.727 m (5' 8\")   Wt 101 kg (223 lb)   SpO2 96%   BMI 33.91 kg/m²   Gen: Well appearing  HEENT: Symmetric face. Anicteric sclerae. Moist mucus membranes  NECK: No JVD. No lymphadenopathy  CARDIAC: Normal PMI, regular, normal S1, S2. without murmur  VASCULATURE: Normal carotid amplitude without bruit.   RESP: Clear to auscultation bilaterally  ABD: Soft, non-tender, non-distended  EXT: No edema, no clubbing or cyanosis  SKIN: Warm and dry  NEURO: No gross deficits  PSYCH: Appropriate affect, participates in conversation    Past Medical History:   Diagnosis Date   • Arthritis    • Chronic airway obstruction, not elsewhere classified    • Dental disorder     dentures   • Grave's disease    • Hypertension    • IBS (irritable bowel syndrome)    • Pain     lower back   • Personal history of venous thrombosis and embolism 6/23/2009    right leg     Allergies   Allergen Reactions   • Bee Anaphylaxis   • Diphenoxylate Rash     .   • Penicillins Shortness of Breath   • Soap Hives   • Tape Rash     .   • Tetracycline Hives and Shortness of Breath     Outpatient Encounter Medications as of 3/9/2021   Medication Sig Dispense Refill   • Olmesartan-amLODIPine-HCTZ 20-5-12.5 MG Tab Take 1 Delayed release capsule by mouth every day. 90 tablet 3   • metoprolol SR (TOPROL XL) 25 MG TABLET SR 24 HR Take 25 mg by mouth every day.     • Budeson-Glycopyrrol-Formoterol (BREZTRI AEROSPHERE) 160-9-4.8 MCG/ACT Aerosol Inhale 2 times a day. 2 puffs twice daily     • cetirizine " (ZYRTEC) 10 MG Tab Take 10 mg by mouth every day.     • montelukast (SINGULAIR) 10 MG Tab Take 10 mg by mouth every day.     • sumatriptan (IMITREX) 100 MG tablet Take 100 mg by mouth Once PRN for Migraine.     • B Complex-C (SUPER B COMPLEX PO) Take  by mouth.     • Cyanocobalamin (VITAMIN B 12 PO) Take  by mouth.     • Misc Natural Products (OSTEO BI-FLEX JOINT SHIELD PO) Take  by mouth.     • TURMERIC PO Take  by mouth.     • Probiotic Product (PROBIOTIC ADVANCED PO) Take  by mouth.     • COLLAGEN PO Take  by mouth.     • atorvastatin (LIPITOR) 40 MG Tab Take 1 Tab by mouth every day. 90 Tab 3   • KRILL OIL PO Take 1 Cap by mouth every day.     • vitamin D (CHOLECALCIFEROL) 1000 Unit (25 mcg) Tab Take 1,000 Units by mouth every day.     • pantoprazole (PROTONIX) 40 MG Tablet Delayed Response Take 40 mg by mouth every day.     • VENTOLIN  (90 Base) MCG/ACT Aero Soln inhalation aerosol Inhale 2 Puffs by mouth every four hours as needed for Shortness of Breath.  5   • dicyclomine (BENTYL) 20 MG Tab Take 20 mg by mouth every 8 hours as needed (For cramps).     • aspirin (ASA) 81 MG Chew Tab chewable tablet Take 81 mg by mouth every day.     • Melatonin 10 MG Tab Take 10 mg by mouth every bedtime.     • Aspirin-Acetaminophen-Caffeine (EXCEDRIN MIGRAINE PO) Take 2 Tabs by mouth Once PRN (For headache).     • ondansetron (ZOFRAN ODT) 8 MG TABLET DISPERSIBLE Take 8 mg by mouth every 6 hours as needed for Nausea.  6   • promethazine (PHENERGAN) 25 MG Tab Take 25 mg by mouth every 6 hours as needed for Nausea/Vomiting.     • fluticasone (FLONASE) 50 MCG/ACT nasal spray Spray 1 Spray in nose every day.     • CYCLOBENZAPRINE HCL 10 MG PO TABS Take 1 Tab by mouth 3 times a day.     • HYDROCODONE-ACETAMINOPHEN  MG PO TABS Take 1 Tab by mouth 3 times a day.     • [DISCONTINUED] amLODIPine (NORVASC) 5 MG Tab Take 1 tablet by mouth every day. (Patient not taking: Reported on 3/9/2021) 90 tablet 1   • [DISCONTINUED]  benazepril (LOTENSIN) 40 MG tablet Take 1 Tab by mouth every day. 90 Tab 2     No facility-administered encounter medications on file as of 3/9/2021.     Social History     Socioeconomic History   • Marital status:      Spouse name: Not on file   • Number of children: Not on file   • Years of education: Not on file   • Highest education level: Not on file   Occupational History   • Not on file   Tobacco Use   • Smoking status: Current Every Day Smoker     Packs/day: 0.50     Years: 30.00     Pack years: 15.00     Types: Cigarettes     Start date: 1979   • Smokeless tobacco: Never Used   • Tobacco comment: ~10 cigarettes daily   Substance and Sexual Activity   • Alcohol use: No   • Drug use: No   • Sexual activity: Not Currently   Other Topics Concern   • Not on file   Social History Narrative   • Not on file     Social Determinants of Health     Financial Resource Strain:    • Difficulty of Paying Living Expenses:    Food Insecurity:    • Worried About Running Out of Food in the Last Year:    • Ran Out of Food in the Last Year:    Transportation Needs:    • Lack of Transportation (Medical):    • Lack of Transportation (Non-Medical):    Physical Activity:    • Days of Exercise per Week:    • Minutes of Exercise per Session:    Stress:    • Feeling of Stress :    Social Connections:    • Frequency of Communication with Friends and Family:    • Frequency of Social Gatherings with Friends and Family:    • Attends Holiness Services:    • Active Member of Clubs or Organizations:    • Attends Club or Organization Meetings:    • Marital Status:    Intimate Partner Violence:    • Fear of Current or Ex-Partner:    • Emotionally Abused:    • Physically Abused:    • Sexually Abused:        Studies  Lab Results   Component Value Date/Time    CHOLSTRLTOT 222 (H) 11/17/2015 02:15 PM     (H) 11/17/2015 02:15 PM    HDL 51 11/17/2015 02:15 PM    TRIGLYCERIDE 222 (H) 11/17/2015 02:15 PM       Lab Results   Component  Value Date/Time    SODIUM 138 04/24/2020 01:07 PM    POTASSIUM 4.4 04/24/2020 01:07 PM    CHLORIDE 101 04/24/2020 01:07 PM    CO2 22 04/24/2020 01:07 PM    GLUCOSE 110 (H) 04/24/2020 01:07 PM    BUN 19 04/24/2020 01:07 PM    CREATININE 1.31 04/24/2020 01:07 PM     Lab Results   Component Value Date/Time    ALKPHOSPHAT 106 (H) 04/24/2020 01:07 PM    ASTSGOT 17 04/24/2020 01:07 PM    ALTSGPT 16 04/24/2020 01:07 PM    TBILIRUBIN 0.4 04/24/2020 01:07 PM        For this encounter I reviewed the following medical records BMP, Lipid profile and Vascular duplex study, echocardiogram

## 2021-03-18 ENCOUNTER — HOSPITAL ENCOUNTER (OUTPATIENT)
Dept: LAB | Facility: MEDICAL CENTER | Age: 61
End: 2021-03-18
Attending: INTERNAL MEDICINE
Payer: COMMERCIAL

## 2021-03-18 ENCOUNTER — OFFICE VISIT (OUTPATIENT)
Dept: SLEEP MEDICINE | Facility: MEDICAL CENTER | Age: 61
End: 2021-03-18
Payer: COMMERCIAL

## 2021-03-18 ENCOUNTER — NON-PROVIDER VISIT (OUTPATIENT)
Dept: SLEEP MEDICINE | Facility: MEDICAL CENTER | Age: 61
End: 2021-03-18
Attending: INTERNAL MEDICINE
Payer: COMMERCIAL

## 2021-03-18 ENCOUNTER — HOSPITAL ENCOUNTER (OUTPATIENT)
Dept: RADIOLOGY | Facility: MEDICAL CENTER | Age: 61
End: 2021-03-18
Attending: PHYSICAL MEDICINE & REHABILITATION
Payer: COMMERCIAL

## 2021-03-18 VITALS
BODY MASS INDEX: 33.04 KG/M2 | WEIGHT: 218 LBS | OXYGEN SATURATION: 95 % | SYSTOLIC BLOOD PRESSURE: 118 MMHG | HEART RATE: 78 BPM | TEMPERATURE: 97.5 F | DIASTOLIC BLOOD PRESSURE: 72 MMHG | HEIGHT: 68 IN | RESPIRATION RATE: 16 BRPM

## 2021-03-18 VITALS — HEIGHT: 68 IN | BODY MASS INDEX: 33.04 KG/M2 | WEIGHT: 218 LBS

## 2021-03-18 DIAGNOSIS — I10 ESSENTIAL HYPERTENSION, BENIGN: ICD-10-CM

## 2021-03-18 DIAGNOSIS — U07.1 PNEUMONIA DUE TO COVID-19 VIRUS: ICD-10-CM

## 2021-03-18 DIAGNOSIS — J45.30 MILD PERSISTENT ASTHMA WITHOUT COMPLICATION: ICD-10-CM

## 2021-03-18 DIAGNOSIS — J45.909 BRONCHITIS WITH ASTHMA, ACUTE: ICD-10-CM

## 2021-03-18 DIAGNOSIS — R10.2 PELVIC PAIN: ICD-10-CM

## 2021-03-18 DIAGNOSIS — R06.09 DYSPNEA ON EXERTION: ICD-10-CM

## 2021-03-18 DIAGNOSIS — J20.9 BRONCHITIS WITH ASTHMA, ACUTE: ICD-10-CM

## 2021-03-18 DIAGNOSIS — J12.82 PNEUMONIA DUE TO COVID-19 VIRUS: ICD-10-CM

## 2021-03-18 LAB
ANION GAP SERPL CALC-SCNC: 11 MMOL/L (ref 7–16)
BUN SERPL-MCNC: 16 MG/DL (ref 8–22)
CALCIUM SERPL-MCNC: 9.8 MG/DL (ref 8.4–10.2)
CHLORIDE SERPL-SCNC: 100 MMOL/L (ref 96–112)
CO2 SERPL-SCNC: 25 MMOL/L (ref 20–33)
CREAT SERPL-MCNC: 1.02 MG/DL (ref 0.5–1.4)
GLUCOSE SERPL-MCNC: 128 MG/DL (ref 65–99)
NT-PROBNP SERPL IA-MCNC: 97 PG/ML (ref 0–125)
POTASSIUM SERPL-SCNC: 3.9 MMOL/L (ref 3.6–5.5)
SODIUM SERPL-SCNC: 136 MMOL/L (ref 135–145)

## 2021-03-18 PROCEDURE — 94729 DIFFUSING CAPACITY: CPT | Performed by: INTERNAL MEDICINE

## 2021-03-18 PROCEDURE — 94618 PULMONARY STRESS TESTING: CPT | Performed by: INTERNAL MEDICINE

## 2021-03-18 PROCEDURE — 80048 BASIC METABOLIC PNL TOTAL CA: CPT

## 2021-03-18 PROCEDURE — 94060 EVALUATION OF WHEEZING: CPT | Performed by: INTERNAL MEDICINE

## 2021-03-18 PROCEDURE — 99214 OFFICE O/P EST MOD 30 MIN: CPT | Mod: 25 | Performed by: INTERNAL MEDICINE

## 2021-03-18 PROCEDURE — 73521 X-RAY EXAM HIPS BI 2 VIEWS: CPT

## 2021-03-18 PROCEDURE — 36415 COLL VENOUS BLD VENIPUNCTURE: CPT

## 2021-03-18 PROCEDURE — 94726 PLETHYSMOGRAPHY LUNG VOLUMES: CPT | Performed by: INTERNAL MEDICINE

## 2021-03-18 PROCEDURE — 83880 ASSAY OF NATRIURETIC PEPTIDE: CPT

## 2021-03-18 RX ORDER — AZITHROMYCIN 250 MG/1
TABLET, FILM COATED ORAL
Qty: 6 TABLET | Refills: 1 | Status: SHIPPED | OUTPATIENT
Start: 2021-03-18 | End: 2021-12-18

## 2021-03-18 ASSESSMENT — PULMONARY FUNCTION TESTS
FVC: 2.76
FEV1/FVC_PERCENT_PREDICTED: 72
FEV1/FVC: 57
FEV1/FVC_PERCENT_PREDICTED: 73
FVC_LLN: 3.06
FEV1: 1.42
FEV1/FVC_PERCENT_LLN: 66
FEV1_PERCENT_PREDICTED: 56
FVC: 2.48
FEV1_PREDICTED: 2.87
FEV1/FVC_PERCENT_PREDICTED: 78
FEV1_PERCENT_PREDICTED: 49
FEV1_PERCENT_CHANGE: 11
FEV1_LLN: 2.40
FEV1/FVC: 59
FVC_PERCENT_PREDICTED: 67
FEV1: 1.63
FEV1/FVC: 57
FEV1/FVC_PERCENT_PREDICTED: 75
FEV1/FVC_PERCENT_PREDICTED: 74
FVC_PERCENT_PREDICTED: 75
FVC_PREDICTED: 3.67
FEV1/FVC_PREDICTED: 79
FEV1/FVC_PERCENT_CHANGE: 127
FEV1/FVC: 59.06
FEV1/FVC_PERCENT_CHANGE: 2
FEV1_PERCENT_CHANGE: 14

## 2021-03-18 ASSESSMENT — 6 MINUTE WALK TEST (6MWT)
PERCENT OF NORMAL WALKED: 56
SAO2 AT 1 MIN: 97
HEART RATE: 90
PERCEIVED FATIGUE AT 2 MIN: 6
PERCEIVED BREATHLESSNESS AT 1 MIN: 2
PERCEIVED FATIGUE AT 1 MIN: 6
COMMENTS: TEST ON ROOM AIR
SAO2 AT 6 MIN: 94
HEART RATE AT 2 MIN: 97
PERCEIVED BREATHLESSNESS AT 2 MIN: 2
SAO2 AT 5 MIN: 93
HEART RATE AT 1 MIN: 91
O2 SAT PERCENT ROOM AIR: 97
PERCEIVED FATIGUE AT 1 MIN: 6
SITTING BLOOD PRESSURE: 118/78
BLOOD PRESSURE AT 1 MIN: 128/82
PERCEIVED BREATHLESSNESS AT 5 MIN: 2
BLOOD PRESSURE: LEFT ARM
PERCEIVED BREATHLESSNESS AT 6 MIN: 2
SAO2 AT 3 MIN: 94
HEART RATE AT 1 MIN: 99
HEART RATE AT 6 MIN: 100
PERCEIVED FATIGUE AT 3 MIN: 6
TOTAL REST TIME: 0
SAO2 AT 1 MIN: 97
HEART RATE AT 4 MIN: 106
PERCEIVED FATIGUE AT 4 MIN: 6
PERCEIVED FATIGUE AT 2 MIN: 6
SAO2 AT 2 MIN: 96
PERCEIVED FATIGUE AT 6 MIN: 6
HEART RATE AT 5 MIN: 108
PERCEIVED BREATHLESSNESS AT 4 MIN: 2
SAO2 AT 4 MIN: 93
PERCEIVED BREATHLESSNESS AT 2 MIN: 2
NUMBER OF RESTS: 0
PERCEIVED FATIGUE AT 5 MIN: 6
HEART RATE AT 2 MIN: 94
PERCEIVED BREATHLESSNESS AT 3 MIN: 2
SAO2 AT 2 MIN: 94
PERCEIVED BREATHLESSNESS AT 1 MIN: 2
HEART RATE AT 3 MIN: 100

## 2021-03-18 ASSESSMENT — FIBROSIS 4 INDEX
FIB4 SCORE: 0.95
FIB4 SCORE: 0.95

## 2021-03-18 ASSESSMENT — PAIN SCALES - GENERAL: PAINLEVEL: NO PAIN

## 2021-03-18 NOTE — PROGRESS NOTES
Anette Yost is a 61 y.o. female here for Covid pneumonia and COPD with results of lung function testing and 6-minute walk test. Patient was referred by primary care.    History of Present Illness: As follows  Anette is recovering from Covid pneumonia, also has underlying COPD with emphysema and reactive airways.  She is on a maintenance program of Singulair, [address], albuterol rescue.  Her lung function testing today shows very severe obstruction with mid flows at 19%, FEV1 FVC ratio low at 57% and a definite bronchodilator response.  Lung volumes show mild to moderate hyperinflation and reduced oxygen transfer at 82% is consistent with underlying emphysema.  She is counseled regarding smoking, has cut back, is at 1/2 pack/day and further reduction would be advisable.    The other problem is that she has acute bronchitis, is coughing up green sputum, also has sinus trouble and an ear infection and is awaiting an appointment with ear nose and throat specialist, Dr. Boss, she has had contact with him before.    This lady lives in [place], has a good maintenance and rescue program, I am treating her bronchitis and assuming the best I can see her again in 3 months, sooner for problems  Constitutional ROS: No unexpected change in weight, No unexplained fevers  Eyes: No change in vision or blurring or double vision  Mouth/Throat ROS: No sore throat, No recent change in voice or hoarseness  Pulmonary ROS: See present history for pertinent positives  Cardiovascular ROS: No chest pain to suggest acute coronary syndrome  Gastrointestinal ROS: No abdominal pain to suggest peptic disease  Musculoskeletal/Extremities ROS: no acute artritis or unusual swelling  Hematologic/Lymphatic ROS: No easy bleeding or unusual lymph node swelling  Neurologic ROS: No new or unusual weakness  Psychiatric ROS: No hallucinations  Allergic/Immunologic: No  urticaria or allergic rash      Current Outpatient Medications    Medication Sig Dispense Refill   • azithromycin (ZITHROMAX) 250 MG Tab Take 2 tablets on day 1, then take 1 tablet a day for 4 days. 6 tablet 1   • Olmesartan-amLODIPine-HCTZ 20-5-12.5 MG Tab Take 1 Delayed release capsule by mouth every day. 90 tablet 3   • metoprolol SR (TOPROL XL) 25 MG TABLET SR 24 HR Take 25 mg by mouth every day.     • Budeson-Glycopyrrol-Formoterol (BREZTRI AEROSPHERE) 160-9-4.8 MCG/ACT Aerosol Inhale 2 times a day. 2 puffs twice daily     • cetirizine (ZYRTEC) 10 MG Tab Take 10 mg by mouth every day.     • montelukast (SINGULAIR) 10 MG Tab Take 10 mg by mouth every day.     • sumatriptan (IMITREX) 100 MG tablet Take 100 mg by mouth Once PRN for Migraine.     • B Complex-C (SUPER B COMPLEX PO) Take  by mouth.     • Cyanocobalamin (VITAMIN B 12 PO) Take  by mouth.     • Misc Natural Products (OSTEO BI-FLEX JOINT SHIELD PO) Take  by mouth.     • TURMERIC PO Take  by mouth.     • Probiotic Product (PROBIOTIC ADVANCED PO) Take  by mouth.     • COLLAGEN PO Take  by mouth.     • atorvastatin (LIPITOR) 40 MG Tab Take 1 Tab by mouth every day. 90 Tab 3   • KRILL OIL PO Take 1 Cap by mouth every day.     • vitamin D (CHOLECALCIFEROL) 1000 Unit (25 mcg) Tab Take 1,000 Units by mouth every day.     • pantoprazole (PROTONIX) 40 MG Tablet Delayed Response Take 40 mg by mouth every day.     • VENTOLIN  (90 Base) MCG/ACT Aero Soln inhalation aerosol Inhale 2 Puffs by mouth every four hours as needed for Shortness of Breath.  5   • dicyclomine (BENTYL) 20 MG Tab Take 20 mg by mouth every 8 hours as needed (For cramps).     • aspirin (ASA) 81 MG Chew Tab chewable tablet Take 81 mg by mouth every day.     • Melatonin 10 MG Tab Take 10 mg by mouth every bedtime.     • Aspirin-Acetaminophen-Caffeine (EXCEDRIN MIGRAINE PO) Take 2 Tabs by mouth Once PRN (For headache).     • ondansetron (ZOFRAN ODT) 8 MG TABLET DISPERSIBLE Take 8 mg by mouth every 6 hours as needed for Nausea.  6   • promethazine  (PHENERGAN) 25 MG Tab Take 25 mg by mouth every 6 hours as needed for Nausea/Vomiting.     • fluticasone (FLONASE) 50 MCG/ACT nasal spray Spray 1 Spray in nose every day.     • CYCLOBENZAPRINE HCL 10 MG PO TABS Take 1 Tab by mouth 3 times a day.     • HYDROCODONE-ACETAMINOPHEN  MG PO TABS Take 1 Tab by mouth 3 times a day.       No current facility-administered medications for this visit.       Social History     Tobacco Use   • Smoking status: Current Every Day Smoker     Packs/day: 0.50     Years: 30.00     Pack years: 15.00     Types: Cigarettes     Start date: 1979   • Smokeless tobacco: Never Used   • Tobacco comment: ~10 cigarettes daily   Substance Use Topics   • Alcohol use: No   • Drug use: No        Past Medical History:   Diagnosis Date   • Arthritis    • Chronic airway obstruction, not elsewhere classified    • Dental disorder     dentures   • Grave's disease    • Hypertension    • IBS (irritable bowel syndrome)    • Pain     lower back   • Personal history of venous thrombosis and embolism 6/23/2009    right leg       Past Surgical History:   Procedure Laterality Date   • ACL RECONSTRUCTION SCOPE  7/9/2009    Performed by RENEE LEVI at SURGERY [facility] ORS   • LUMBAR FUSION POSTERIOR  2004   • PB DISKECTOMY,PERCUTANEOUS LUMBAR  2001   • HYSTERECTOMY, VAGINAL  1999   • TUBAL LIGATION  1994   • TONSILLECTOMY  1968       Allergies: Bee, Diphenoxylate, Penicillins, Soap, Tape, and Tetracycline    Family History   Problem Relation Age of Onset   • Cancer Maternal Grandmother         breast    • Cancer Maternal Aunt    • Cancer Mother         mm    • Cancer Father         colon cancer    • Hypertension Sister    • Diabetes Sister    • Cancer Maternal Grandfather         barretts/lung    • Cancer Paternal Grandmother    • Lung Disease Paternal Grandfather    • Cancer Paternal Grandfather         lung   • Cancer Other         hodkins (remission)    • Hypertension Brother    • Diabetes Brother   "      Physical Examination    Vitals:    03/18/21 1057   Height: 1.727 m (5' 8\")   Weight: 98.9 kg (218 lb)   Weight % change since last entry.: 0 %   BP: 118/72   Pulse: 78   BMI (Calculated): 33.15   Resp: 16   Temp: 36.4 °C (97.5 °F)   TempSrc: Temporal       General Appearance: alert, no distress  Skin: Skin color, texture, turgor normal. No rashes or lesions.  Eyes: negative  Oropharynx: Lips, mucosa, and tongue normal. Teeth and gums normal. Oropharynx moist and without lesion  Lungs: positive findings: Reduced breath sounds but no significant rhonchi or wheezes  Heart: negative. RRR without murmur, gallop, or rubs.  No ectopy.  Abdomen: Abdomen soft, non-tender. . No masses,  No organomegaly  Extremities:  No deformities, edema, or skin discoloration  Joints: No acute arthritis  Peripheral Pulses:perfused  Neurologic: intact grossly  No clubbing or cyanosis    II (soft palate, uvula, fauces visible)    Imaging: Previously reviewed    PFTS: Scribed above      Assessment and Plan  1. Pneumonia due to COVID-19 virus  Clinically resolved    2. Mild persistent asthma without complication  Maintenance and rescue therapy    3. Dyspnea on exertion  No desaturation on 6-minute walk test    4. Bronchitis with asthma, acute  Z-Noam for mucopurulence      Followup Return in about 3 months (around 6/18/2021).    "

## 2021-03-18 NOTE — PROCEDURES
Tech: Afshan Mancini, RRT, CPFT  Tech notes: Good patient effort & cooperation.  Light headedness after FVC and MVV.  FRCN2 done instead of Pleth, due to claustrophobia.  The results of this test meet the ATS/ERS standards for acceptability & reproducibility.  Test was performed on the [employer] Body Plethysmograph-Elite DX system.  Predicted values were GLI-2012 for spirometry, GLI- 2017 for DLCO, ITS for Lung Volumes.  The DLCO was uncorrected for Hgb.  A bronchodilator of Ventolin HFA -2puffs via spacer administered.    Interpretation:    Lung function testing done March 18, 2021 showed significant reduction of mid flows at 19% predicted.  FEV1 is low at 1.42 L, 49% predicted.  FEV1/FVC ratio is 57%.  Definite bronchodilator response.  Lung volumes demonstrate moderate hyperinflation, residual volume is 153%.  Oxygen transfer low at 82% suggests emphysema and flow volume loop confirms severe obstruction, good effort noted

## 2021-03-18 NOTE — PATIENT INSTRUCTIONS
Anette is recovering from Covid pneumonia, also has underlying COPD with emphysema and reactive airways.  She is on a maintenance program of Singulair, [address], albuterol rescue.  Her lung function testing today shows very severe obstruction with mid flows at 19%, FEV1 FVC ratio low at 57% and a definite bronchodilator response.  Lung volumes show mild to moderate hyperinflation and reduced oxygen transfer at 82% is consistent with underlying emphysema.  She is counseled regarding smoking, has cut back, is at 1/2 pack/day and further reduction would be advisable.    The other problem is that she has acute bronchitis, is coughing up green sputum, also has sinus trouble and an ear infection and is awaiting an appointment with ear nose and throat specialist, Dr. Boss, she has had contact with him before.    This lady lives in [place], has a good maintenance and rescue program, I am treating her bronchitis and assuming the best I can see her again in 3 months, sooner for problems

## 2021-03-18 NOTE — PROCEDURES
On Room Air.  6-minute walk test completed on March 18, 2021 showed no significant desaturation, normal heart rate response, mild reduction of exercise tolerance but hemodynamic heart rate and oxygen saturations were all within acceptable range

## 2021-04-10 ENCOUNTER — HOSPITAL ENCOUNTER (OUTPATIENT)
Dept: RADIOLOGY | Facility: MEDICAL CENTER | Age: 61
End: 2021-04-10
Attending: SURGERY
Payer: COMMERCIAL

## 2021-04-10 DIAGNOSIS — I65.22 OCCLUSION OF LEFT CAROTID ARTERY: ICD-10-CM

## 2021-04-10 PROCEDURE — 70498 CT ANGIOGRAPHY NECK: CPT

## 2021-04-10 PROCEDURE — 700117 HCHG RX CONTRAST REV CODE 255: Performed by: NURSE PRACTITIONER

## 2021-04-10 RX ADMIN — IOHEXOL 80 ML: 350 INJECTION, SOLUTION INTRAVENOUS at 08:43

## 2021-06-03 ENCOUNTER — OFFICE VISIT (OUTPATIENT)
Dept: CARDIOLOGY | Facility: MEDICAL CENTER | Age: 61
End: 2021-06-03
Payer: COMMERCIAL

## 2021-06-03 VITALS
OXYGEN SATURATION: 95 % | WEIGHT: 222 LBS | HEART RATE: 84 BPM | SYSTOLIC BLOOD PRESSURE: 128 MMHG | DIASTOLIC BLOOD PRESSURE: 80 MMHG | RESPIRATION RATE: 14 BRPM | HEIGHT: 68 IN | BODY MASS INDEX: 33.65 KG/M2

## 2021-06-03 DIAGNOSIS — E78.5 DYSLIPIDEMIA: ICD-10-CM

## 2021-06-03 DIAGNOSIS — R00.2 PALPITATIONS: ICD-10-CM

## 2021-06-03 DIAGNOSIS — R07.2 PRECORDIAL PAIN: ICD-10-CM

## 2021-06-03 DIAGNOSIS — U09.9 COVID-19 LONG HAULER: ICD-10-CM

## 2021-06-03 DIAGNOSIS — I73.9 PAD (PERIPHERAL ARTERY DISEASE) (HCC): ICD-10-CM

## 2021-06-03 DIAGNOSIS — Z72.0 TOBACCO USE: ICD-10-CM

## 2021-06-03 PROCEDURE — 99214 OFFICE O/P EST MOD 30 MIN: CPT | Performed by: INTERNAL MEDICINE

## 2021-06-03 RX ORDER — ATORVASTATIN CALCIUM 80 MG/1
80 TABLET, FILM COATED ORAL DAILY
Qty: 30 TABLET | Refills: 11 | Status: SHIPPED | OUTPATIENT
Start: 2021-06-03 | End: 2021-09-10 | Stop reason: SDUPTHER

## 2021-06-03 RX ORDER — ATORVASTATIN CALCIUM 80 MG/1
40 TABLET, FILM COATED ORAL DAILY
Qty: 90 TABLET | Refills: 3 | Status: SHIPPED | OUTPATIENT
Start: 2021-06-03 | End: 2021-06-03

## 2021-06-03 NOTE — PROGRESS NOTES
"CARDIOLOGY OUTPATIENT FOLLOWUP    PCP: Nathan Walters M.D.    1. Precordial pain    2. Tobacco use    3. Dyslipidemia    4. COVID-19 long hauler    5. Palpitations    6. PAD (peripheral artery disease) ([facility])      Anette Yost continues to suffer from lingering symptoms following COVID-19 infection.  Most notably is the exertional intolerance.  Given the marked peripheral vascular disease I think it is appropriate to proceed with stress echocardiogram I also advised doubling the dose of atorvastatin to 80 mg.  She will observe symptoms and if they progress further we could consider a statin holiday.    I again discussed smoking cessation techniques with her for 4 minutes.  We reviewed the importance of regularly renewing the commitment amongst other techniques.    Follow up: in 6 months    Chief Complaint   Patient presents with   • Dyslipidemia   • Hypertension     F/V Dx: Essential hypertension, benign       History: Anette Yost is a 61 y.o. female presenting for follow-up of cardiovascular disease.  In December she was diagnosed with Covid and has had exertional shortness of breath and fatigue since that time.  Symptoms are stable and associated with a chest tightness.  She does have some improvement with inhalers.  She has been found to have progressive peripheral vascular disease with occlusion of the left subclavian artery as well as severe stenosis of the right subclavian.  This is confounded blood pressure management.  She is taking Tribenzor and experiences a intermittent orthostatic sensation.  She continues to smoke 10 cigarettes daily and is making efforts to quit.      ROS:   All other systems reviewed and negative except as per the HPI    PE:  /80 (BP Location: Right arm, Patient Position: Sitting, BP Cuff Size: Adult)   Pulse 84   Resp 14   Ht 1.727 m (5' 8\")   Wt 101 kg (222 lb)   SpO2 95%   BMI 33.75 kg/m²   Gen: no acute distress  HEENT: Symmetric face. " Anicteric sclerae. Moist mucus membranes  NECK: No JVD. No lymphadenopathy  CARDIAC: Normal PMI, Normal S1, S2, No murmur  VASCULATURE: carotids are normal bilaterally without bruit  RESP: Clear to auscultation bilaterally  ABD: Soft, non-tender, non-distended  EXT: No edema, no clubbing or cyanosis  SKIN: Warm and dry  NEURO: No gross deficits  PSYCH: Appropriate affect, participates in conversation    The ASCVD Risk score (Dariel LYN Jr, et al., 2013) failed to calculate.    Past Medical History:   Diagnosis Date   • Arthritis    • Chronic airway obstruction, not elsewhere classified    • Dental disorder     dentures   • Grave's disease    • Hypertension    • IBS (irritable bowel syndrome)    • Pain     lower back   • Personal history of venous thrombosis and embolism 6/23/2009    right leg     Allergies   Allergen Reactions   • Bee Anaphylaxis   • Diphenoxylate Rash     .   • Penicillins Shortness of Breath   • Soap Hives   • Tape Rash     .   • Tetracycline Hives and Shortness of Breath     Outpatient Encounter Medications as of 6/3/2021   Medication Sig Dispense Refill   • atorvastatin (LIPITOR) 80 MG tablet Take 1 tablet by mouth every day. 30 tablet 11   • azithromycin (ZITHROMAX) 250 MG Tab Take 2 tablets on day 1, then take 1 tablet a day for 4 days. 6 tablet 1   • Olmesartan-amLODIPine-HCTZ 20-5-12.5 MG Tab Take 1 Delayed release capsule by mouth every day. 90 tablet 3   • metoprolol SR (TOPROL XL) 25 MG TABLET SR 24 HR Take 25 mg by mouth every day.     • Budeson-Glycopyrrol-Formoterol (BREZTRI AEROSPHERE) 160-9-4.8 MCG/ACT Aerosol Inhale 2 times a day. 2 puffs twice daily     • cetirizine (ZYRTEC) 10 MG Tab Take 10 mg by mouth every day.     • montelukast (SINGULAIR) 10 MG Tab Take 10 mg by mouth every day.     • sumatriptan (IMITREX) 100 MG tablet Take 100 mg by mouth Once PRN for Migraine.     • B Complex-C (SUPER B COMPLEX PO) Take  by mouth.     • Cyanocobalamin (VITAMIN B 12 PO) Take  by mouth.     •  Misc Natural Products (OSTEO BI-FLEX JOINT SHIELD PO) Take  by mouth.     • TURMERIC PO Take  by mouth.     • Probiotic Product (PROBIOTIC ADVANCED PO) Take  by mouth.     • COLLAGEN PO Take  by mouth.     • pantoprazole (PROTONIX) 40 MG Tablet Delayed Response Take 40 mg by mouth every day.     • VENTOLIN  (90 Base) MCG/ACT Aero Soln inhalation aerosol Inhale 2 Puffs by mouth every four hours as needed for Shortness of Breath.  5   • dicyclomine (BENTYL) 20 MG Tab Take 20 mg by mouth every 8 hours as needed (For cramps).     • aspirin (ASA) 81 MG Chew Tab chewable tablet Take 81 mg by mouth every day.     • Melatonin 10 MG Tab Take 10 mg by mouth every bedtime.     • ondansetron (ZOFRAN ODT) 8 MG TABLET DISPERSIBLE Take 8 mg by mouth every 6 hours as needed for Nausea.  6   • promethazine (PHENERGAN) 25 MG Tab Take 25 mg by mouth every 6 hours as needed for Nausea/Vomiting.     • fluticasone (FLONASE) 50 MCG/ACT nasal spray Spray 1 Spray in nose every day.     • CYCLOBENZAPRINE HCL 10 MG PO TABS Take 1 Tab by mouth 3 times a day.     • HYDROCODONE-ACETAMINOPHEN  MG PO TABS Take 1 Tab by mouth 3 times a day.     • [DISCONTINUED] atorvastatin (LIPITOR) 80 MG tablet Take 0.5 Tablets by mouth every day. 90 tablet 3   • [DISCONTINUED] atorvastatin (LIPITOR) 40 MG Tab Take 1 Tab by mouth every day. 90 Tab 3   • [DISCONTINUED] KRILL OIL PO Take 1 Cap by mouth every day.     • vitamin D (CHOLECALCIFEROL) 1000 Unit (25 mcg) Tab Take 1,000 Units by mouth every day.     • [DISCONTINUED] Aspirin-Acetaminophen-Caffeine (EXCEDRIN MIGRAINE PO) Take 2 Tabs by mouth Once PRN (For headache). (Patient not taking: Reported on 6/3/2021)       No facility-administered encounter medications on file as of 6/3/2021.     Social History     Socioeconomic History   • Marital status:      Spouse name: Not on file   • Number of children: Not on file   • Years of education: Not on file   • Highest education level: Not on  file   Occupational History   • Not on file   Tobacco Use   • Smoking status: Current Every Day Smoker     Packs/day: 0.50     Years: 30.00     Pack years: 15.00     Types: Cigarettes     Start date: 1979   • Smokeless tobacco: Never Used   • Tobacco comment: ~10 cigarettes daily   Vaping Use   • Vaping Use: Former   Substance and Sexual Activity   • Alcohol use: No   • Drug use: No   • Sexual activity: Not Currently   Other Topics Concern   • Not on file   Social History Narrative   • Not on file     Social Determinants of Health     Financial Resource Strain:    • Difficulty of Paying Living Expenses:    Food Insecurity:    • Worried About Running Out of Food in the Last Year:    • Ran Out of Food in the Last Year:    Transportation Needs:    • Lack of Transportation (Medical):    • Lack of Transportation (Non-Medical):    Physical Activity:    • Days of Exercise per Week:    • Minutes of Exercise per Session:    Stress:    • Feeling of Stress :    Social Connections:    • Frequency of Communication with Friends and Family:    • Frequency of Social Gatherings with Friends and Family:    • Attends Christianity Services:    • Active Member of Clubs or Organizations:    • Attends Club or Organization Meetings:    • Marital Status:    Intimate Partner Violence:    • Fear of Current or Ex-Partner:    • Emotionally Abused:    • Physically Abused:    • Sexually Abused:        Studies  Lab Results   Component Value Date/Time    CHOLSTRLTOT 222 (H) 11/17/2015 02:15 PM     (H) 11/17/2015 02:15 PM    HDL 51 11/17/2015 02:15 PM    TRIGLYCERIDE 222 (H) 11/17/2015 02:15 PM       Lab Results   Component Value Date/Time    SODIUM 136 03/18/2021 12:11 PM    POTASSIUM 3.9 03/18/2021 12:11 PM    CHLORIDE 100 03/18/2021 12:11 PM    CO2 25 03/18/2021 12:11 PM    GLUCOSE 128 (H) 03/18/2021 12:11 PM    BUN 16 03/18/2021 12:11 PM    CREATININE 1.02 03/18/2021 12:11 PM     Lab Results   Component Value Date/Time    ALKPHOSPHAT 106 (H)  04/24/2020 01:07 PM    ASTSGOT 17 04/24/2020 01:07 PM    ALTSGPT 16 04/24/2020 01:07 PM    TBILIRUBIN 0.4 04/24/2020 01:07 PM        For this encounter I reviewed the following  (Pulmonary) notes, BMP and Lipid profile   For this encounter I directly reviewed Pulmonary function testing. I agree with the interpretations in the electronic health record.

## 2021-06-21 DIAGNOSIS — G47.34 NOCTURNAL HYPOXIA: ICD-10-CM

## 2021-06-21 DIAGNOSIS — J45.30 MILD PERSISTENT ASTHMA WITHOUT COMPLICATION: ICD-10-CM

## 2021-06-21 NOTE — PROGRESS NOTES
Pt notified and relayed understanding; order faxed to DME:  [facility] / ph [phone] / fax [phone]

## 2021-06-21 NOTE — PROGRESS NOTES
CNOX from outside facility notes persistent low O2 levels on room air; recommend evaluation for sleep apnea versus supplemental oxygen at night @2LPM. Start O2 and will further evaluate this at follow up office visit.

## 2021-07-16 ENCOUNTER — APPOINTMENT (OUTPATIENT)
Dept: SLEEP MEDICINE | Facility: MEDICAL CENTER | Age: 61
End: 2021-07-16
Payer: COMMERCIAL

## 2021-08-10 ENCOUNTER — HOSPITAL ENCOUNTER (OUTPATIENT)
Dept: CARDIOLOGY | Facility: MEDICAL CENTER | Age: 61
End: 2021-08-10
Attending: INTERNAL MEDICINE
Payer: COMMERCIAL

## 2021-08-10 DIAGNOSIS — R07.2 PRECORDIAL PAIN: ICD-10-CM

## 2021-08-10 LAB — LV EJECT FRACT  99904: 70

## 2021-08-10 PROCEDURE — 93018 CV STRESS TEST I&R ONLY: CPT | Performed by: INTERNAL MEDICINE

## 2021-08-10 PROCEDURE — 93350 STRESS TTE ONLY: CPT | Mod: 26 | Performed by: INTERNAL MEDICINE

## 2021-08-10 PROCEDURE — 93017 CV STRESS TEST TRACING ONLY: CPT

## 2021-09-10 ENCOUNTER — TELEPHONE (OUTPATIENT)
Dept: CARDIOLOGY | Facility: MEDICAL CENTER | Age: 61
End: 2021-09-10

## 2021-09-10 ENCOUNTER — TELEPHONE (OUTPATIENT)
Dept: SLEEP MEDICINE | Facility: MEDICAL CENTER | Age: 61
End: 2021-09-10

## 2021-09-10 ENCOUNTER — OFFICE VISIT (OUTPATIENT)
Dept: CARDIOLOGY | Facility: MEDICAL CENTER | Age: 61
End: 2021-09-10
Payer: COMMERCIAL

## 2021-09-10 VITALS
BODY MASS INDEX: 33.19 KG/M2 | HEART RATE: 80 BPM | HEIGHT: 68 IN | DIASTOLIC BLOOD PRESSURE: 68 MMHG | OXYGEN SATURATION: 97 % | SYSTOLIC BLOOD PRESSURE: 110 MMHG | WEIGHT: 219 LBS | RESPIRATION RATE: 16 BRPM

## 2021-09-10 DIAGNOSIS — R06.09 DYSPNEA ON EXERTION: ICD-10-CM

## 2021-09-10 DIAGNOSIS — I10 ESSENTIAL HYPERTENSION, BENIGN: ICD-10-CM

## 2021-09-10 DIAGNOSIS — I73.9 PAD (PERIPHERAL ARTERY DISEASE) (HCC): ICD-10-CM

## 2021-09-10 DIAGNOSIS — Z72.0 TOBACCO USE: ICD-10-CM

## 2021-09-10 DIAGNOSIS — U09.9 COVID-19 LONG HAULER: ICD-10-CM

## 2021-09-10 DIAGNOSIS — E78.5 DYSLIPIDEMIA: ICD-10-CM

## 2021-09-10 PROCEDURE — 99214 OFFICE O/P EST MOD 30 MIN: CPT | Mod: 25 | Performed by: INTERNAL MEDICINE

## 2021-09-10 PROCEDURE — 99407 BEHAV CHNG SMOKING > 10 MIN: CPT | Performed by: INTERNAL MEDICINE

## 2021-09-10 RX ORDER — ATORVASTATIN CALCIUM 80 MG/1
80 TABLET, FILM COATED ORAL DAILY
Qty: 90 TABLET | Refills: 3 | Status: SHIPPED | OUTPATIENT
Start: 2021-09-10 | End: 2022-12-05 | Stop reason: SDUPTHER

## 2021-09-10 RX ORDER — OLMESARTAN MEDOXOMIL, AMLODIPINE AND HYDROCHLOROTHIAZIDE TABLET 20/5/12.5 MG 20; 5; 12.5 MG/1; MG/1; MG/1
1 TABLET ORAL DAILY
Qty: 90 TABLET | Refills: 3 | Status: SHIPPED | OUTPATIENT
Start: 2021-09-10 | End: 2022-10-03 | Stop reason: SDUPTHER

## 2021-09-10 NOTE — PROGRESS NOTES
CARDIOLOGY OUTPATIENT FOLLOWUP    PCP: Nathan Walters M.D.    1. Essential hypertension, benign    2. Dyslipidemia    3. Dyspnea on exertion    4. PAD (peripheral artery disease) (HCC)    5. COVID-19 long hauler    6. Tobacco use        Anette Yost is stable from a cardiovascular standpoint with no signs of heart disease on cardiac testing.  I suspect many of her symptoms are related to post Covid complications of the lungs as well as alterations in autonomic function associated with the infection.  We discussed that many of her symptoms as well as long-term prognosis could be improved with tobacco cessation and we discussed for greater than 10 minutes techniques that she may utilize to achieve this.    Follow up: 1 year    Chief Complaint   Patient presents with   • Dyslipidemia   • Hypertension     F/V Dx: Essential hypertension, benign       History: Anette Yost is a 61 y.o. female tobacco user presenting for follow-up of post Covid symptoms of palpitations and exertional shortness of breath.  She also has peripheral vascular disease with bilateral subclavian artery stenosis.  An echo and a stress echo were normal aside from low exertional capacity.  PFTs suggested significant reactive airway disease and she gets insufficient relief from inhalers.  She has been prescribed oxygen has not yet received it and is going to check with pulmonary later today regarding this.    She continues to smoke cigarettes, 10/day sometimes more.  She has quit in the past.  She uses this as a coping mechanism and finds her self and increasing stress as it is difficult to fulfill the obligations of her job due to her physical condition and is in the process of applying for disability and will be losing insurance in the near future.      ROS:   All other systems reviewed and negative except as per the HPI    PE:  /68 (BP Location: Right arm, Patient Position: Sitting, BP Cuff Size: Adult)   Pulse 80   " Resp 16   Ht 1.727 m (5' 8\")   Wt 99.3 kg (219 lb)   SpO2 97%   BMI 33.30 kg/m²   Gen: no acute distress  HEENT: Symmetric face. Anicteric sclerae. Moist mucus membranes  NECK: No JVD. No lymphadenopathy  CARDIAC: Regular, Normal S1, S2, No murmur  VASCULATURE: carotids are normal bilaterally without bruit  RESP: Clear to auscultation bilaterally  ABD: Soft, non-tender, non-distended  EXT: No edema, no clubbing or cyanosis  SKIN: Warm and dry  NEURO: No gross deficits  PSYCH: Appropriate affect, participates in conversation    The ASCVD Risk score (Dariel DC Jr, et al., 2013) failed to calculate.    Past Medical History:   Diagnosis Date   • Arthritis    • Chronic airway obstruction, not elsewhere classified    • Dental disorder     dentures   • Grave's disease    • Hypertension    • IBS (irritable bowel syndrome)    • Pain     lower back   • Personal history of venous thrombosis and embolism 6/23/2009    right leg     Allergies   Allergen Reactions   • Bee Anaphylaxis   • Diphenoxylate Rash     .   • Penicillins Shortness of Breath   • Soap Hives   • Tape Rash     .   • Tetracycline Hives and Shortness of Breath     Outpatient Encounter Medications as of 9/10/2021   Medication Sig Dispense Refill   • atorvastatin (LIPITOR) 80 MG tablet Take 1 tablet by mouth every day. 30 tablet 11   • azithromycin (ZITHROMAX) 250 MG Tab Take 2 tablets on day 1, then take 1 tablet a day for 4 days. 6 tablet 1   • metoprolol SR (TOPROL XL) 25 MG TABLET SR 24 HR Take 25 mg by mouth every day.     • sumatriptan (IMITREX) 100 MG tablet Take 100 mg by mouth Once PRN for Migraine.     • pantoprazole (PROTONIX) 40 MG Tablet Delayed Response Take 40 mg by mouth every day.     • VENTOLIN  (90 Base) MCG/ACT Aero Soln inhalation aerosol Inhale 2 Puffs by mouth every four hours as needed for Shortness of Breath.  5   • dicyclomine (BENTYL) 20 MG Tab Take 20 mg by mouth every 8 hours as needed (For cramps).     • aspirin (ASA) 81 MG " Chew Tab chewable tablet Take 81 mg by mouth every day.     • Melatonin 10 MG Tab Take 10 mg by mouth every bedtime.     • ondansetron (ZOFRAN ODT) 8 MG TABLET DISPERSIBLE Take 8 mg by mouth every 6 hours as needed for Nausea.  6   • promethazine (PHENERGAN) 25 MG Tab Take 25 mg by mouth every 6 hours as needed for Nausea/Vomiting.     • fluticasone (FLONASE) 50 MCG/ACT nasal spray Spray 1 Spray in nose every day.     • CYCLOBENZAPRINE HCL 10 MG PO TABS Take 1 Tab by mouth 3 times a day.     • HYDROCODONE-ACETAMINOPHEN  MG PO TABS Take 1 Tab by mouth 3 times a day.     • Olmesartan-amLODIPine-HCTZ 20-5-12.5 MG Tab Take 1 Delayed release capsule by mouth every day. 90 tablet 3   • [DISCONTINUED] Budeson-Glycopyrrol-Formoterol (BREZTRI AEROSPHERE) 160-9-4.8 MCG/ACT Aerosol Inhale 2 times a day. 2 puffs twice daily     • [DISCONTINUED] cetirizine (ZYRTEC) 10 MG Tab Take 10 mg by mouth every day. (Patient not taking: Reported on 9/10/2021)     • [DISCONTINUED] montelukast (SINGULAIR) 10 MG Tab Take 10 mg by mouth every day. (Patient not taking: Reported on 9/10/2021)     • [DISCONTINUED] B Complex-C (SUPER B COMPLEX PO) Take  by mouth. (Patient not taking: Reported on 9/10/2021)     • [DISCONTINUED] Cyanocobalamin (VITAMIN B 12 PO) Take  by mouth. (Patient not taking: Reported on 9/10/2021)     • [DISCONTINUED] Misc Natural Products (OSTEO BI-FLEX JOINT SHIELD PO) Take  by mouth. (Patient not taking: Reported on 9/10/2021)     • [DISCONTINUED] TURMERIC PO Take  by mouth. (Patient not taking: Reported on 9/10/2021)     • [DISCONTINUED] Probiotic Product (PROBIOTIC ADVANCED PO) Take  by mouth. (Patient not taking: Reported on 9/10/2021)     • [DISCONTINUED] COLLAGEN PO Take  by mouth. (Patient not taking: Reported on 9/10/2021)     • [DISCONTINUED] vitamin D (CHOLECALCIFEROL) 1000 Unit (25 mcg) Tab Take 1,000 Units by mouth every day. (Patient not taking: Reported on 9/10/2021)       No facility-administered  encounter medications on file as of 9/10/2021.     Social History     Socioeconomic History   • Marital status:      Spouse name: Not on file   • Number of children: Not on file   • Years of education: Not on file   • Highest education level: Not on file   Occupational History   • Not on file   Tobacco Use   • Smoking status: Current Every Day Smoker     Packs/day: 0.50     Years: 30.00     Pack years: 15.00     Types: Cigarettes     Start date: 1979   • Smokeless tobacco: Never Used   • Tobacco comment: ~10 cigarettes daily   Vaping Use   • Vaping Use: Former   Substance and Sexual Activity   • Alcohol use: No   • Drug use: No   • Sexual activity: Not Currently   Other Topics Concern   • Not on file   Social History Narrative   • Not on file     Social Determinants of Health     Financial Resource Strain:    • Difficulty of Paying Living Expenses:    Food Insecurity:    • Worried About Running Out of Food in the Last Year:    • Ran Out of Food in the Last Year:    Transportation Needs:    • Lack of Transportation (Medical):    • Lack of Transportation (Non-Medical):    Physical Activity:    • Days of Exercise per Week:    • Minutes of Exercise per Session:    Stress:    • Feeling of Stress :    Social Connections:    • Frequency of Communication with Friends and Family:    • Frequency of Social Gatherings with Friends and Family:    • Attends [employer] Services:    • Active Member of Clubs or Organizations:    • Attends Club or Organization Meetings:    • Marital Status:    Intimate Partner Violence:    • Fear of Current or Ex-Partner:    • Emotionally Abused:    • Physically Abused:    • Sexually Abused:        Studies  Lab Results   Component Value Date/Time    CHOLSTRLTOT 222 (H) 11/17/2015 02:15 PM     (H) 11/17/2015 02:15 PM    HDL 51 11/17/2015 02:15 PM    TRIGLYCERIDE 222 (H) 11/17/2015 02:15 PM       Lab Results   Component Value Date/Time    SODIUM 136 03/18/2021 12:11 PM    POTASSIUM 3.9  03/18/2021 12:11 PM    CHLORIDE 100 03/18/2021 12:11 PM    CO2 25 03/18/2021 12:11 PM    GLUCOSE 128 (H) 03/18/2021 12:11 PM    BUN 16 03/18/2021 12:11 PM    CREATININE 1.02 03/18/2021 12:11 PM     Lab Results   Component Value Date/Time    ALKPHOSPHAT 106 (H) 04/24/2020 01:07 PM    ASTSGOT 17 04/24/2020 01:07 PM    ALTSGPT 16 04/24/2020 01:07 PM    TBILIRUBIN 0.4 04/24/2020 01:07 PM        For this encounter I reviewed the following medical records BMP, Lipid profile and CBC        30-39 minutes of physician total time spent on the date of the encounter ([address]2001)

## 2021-09-10 NOTE — TELEPHONE ENCOUNTER
BE    Pt called stating she is going to be losing her insurance and would like 90 day prescriptions of atorvastatin (LIPITOR) 80 MG tablet and Olmesartan-amLODIPine-HCTZ 20-5-12.5 MG Tab sent to the [facility].    Thank you

## 2021-09-10 NOTE — TELEPHONE ENCOUNTER
Considering the order is from June 2021 and the OV is from March it was probably denied. And now that its September no DME is going to take the order.    There was 3 months inbetween OV and testing and notes don't document need for night time o2, or any potential for it.       Tried to call, no answer, VM is full.    Pt will need new OV and nocturnal testing before we can get her o2.

## 2021-09-15 DIAGNOSIS — I10 ESSENTIAL HYPERTENSION, BENIGN: ICD-10-CM

## 2021-09-15 RX ORDER — METOPROLOL SUCCINATE 25 MG/1
TABLET, EXTENDED RELEASE ORAL
Qty: 90 TABLET | Refills: 3 | Status: SHIPPED | OUTPATIENT
Start: 2021-09-15 | End: 2022-10-03 | Stop reason: SDUPTHER

## 2021-11-10 ENCOUNTER — OFFICE VISIT (OUTPATIENT)
Dept: SLEEP MEDICINE | Facility: MEDICAL CENTER | Age: 61
End: 2021-11-10
Payer: COMMERCIAL

## 2021-11-10 VITALS
SYSTOLIC BLOOD PRESSURE: 122 MMHG | HEIGHT: 68 IN | DIASTOLIC BLOOD PRESSURE: 62 MMHG | HEART RATE: 82 BPM | OXYGEN SATURATION: 93 % | BODY MASS INDEX: 33.95 KG/M2 | WEIGHT: 224 LBS | RESPIRATION RATE: 16 BRPM

## 2021-11-10 DIAGNOSIS — J43.9 PULMONARY EMPHYSEMA, UNSPECIFIED EMPHYSEMA TYPE (HCC): ICD-10-CM

## 2021-11-10 DIAGNOSIS — U07.1 PNEUMONIA DUE TO COVID-19 VIRUS: ICD-10-CM

## 2021-11-10 DIAGNOSIS — G47.34 NOCTURNAL HYPOXIA: ICD-10-CM

## 2021-11-10 DIAGNOSIS — J12.82 PNEUMONIA DUE TO COVID-19 VIRUS: ICD-10-CM

## 2021-11-10 PROCEDURE — 99214 OFFICE O/P EST MOD 30 MIN: CPT | Performed by: NURSE PRACTITIONER

## 2021-11-10 RX ORDER — BUDESONIDE, GLYCOPYRROLATE, AND FORMOTEROL FUMARATE 160; 9; 4.8 UG/1; UG/1; UG/1
AEROSOL, METERED RESPIRATORY (INHALATION) 2 TIMES DAILY
COMMUNITY
Start: 2021-10-26

## 2021-11-10 RX ORDER — ONDANSETRON HYDROCHLORIDE 8 MG/1
TABLET, FILM COATED ORAL
COMMUNITY
Start: 2021-09-15 | End: 2022-12-05

## 2021-11-10 RX ORDER — MONTELUKAST SODIUM 10 MG/1
TABLET ORAL
COMMUNITY
Start: 2021-10-28

## 2021-11-10 RX ORDER — TRAZODONE HYDROCHLORIDE 100 MG/1
TABLET ORAL
COMMUNITY
Start: 2021-10-26

## 2021-11-10 RX ORDER — ALBUTEROL SULFATE 2.5 MG/3ML
SOLUTION RESPIRATORY (INHALATION)
COMMUNITY
Start: 2021-09-15

## 2021-11-10 ASSESSMENT — COPD QUESTIONNAIRES
CAT_TOTALSCORE: 26
TOTAL_EXACERBATIONS_PASTYEAR: 0 OR 1 WITHOUT HOSPITALIZATION
QUESTION1_COUGHFREQUENCY: COUGH ALL THE TIME
QUESTION3_CHESTTIGHTNESS: NO TIGHTNESS AT ALL
GOLD_GROUP: GOLD GROUP B
QUESTION4_WALKINCLINE: VERY BREATHLESS
QUESTION2_PHLEGM: NO PHLEGM (MUCUS) AT ALL
QUESTION6_LEAVINGHOUSE: CONFIDENT LEAVING HOME
QUESTION8_ENERGYLEVEL: NO ENERGY AT ALL
QUESTION5_HOMEACTIVITIES: 4
MMRC DYSPNEA SCALE: I WALK SLOWER THAN PEOPLE OF THE SAME AGE ON LEVEL GROUND, BECAUSE OF BREATHLESSNESS, OR HAVE TO STOP FOR BREATH WHEN WALKING AT MY OWN PACE ON LEVEL GROUND

## 2021-11-10 NOTE — PATIENT INSTRUCTIONS
Plan is to continue using Breztri and albuterol as needed. Ordering Flutter valve to be used after nebulizer treatment for chest congestion. We will  Repeat CNOX to re-establish need for nocturnal O2 .We will also order updated Ct chest and PFTs. Please obtain CNOX test and flutter valve from:  [address] [id number]  Follow-up in 3 months

## 2021-11-10 NOTE — PROGRESS NOTES
Chief Complaint   Patient presents with   • Follow-Up     Asthma       HPI:  Anette Yost is a 61 y.o. year old female here today for follow-up on COPD, Covid pneumonia history.  Patient is considered a Covid long-haul her as she was infected with Covid between December to February.  She denies any hospitalizations, however she states she stayed home for that prolonged period of time.  She also has a history of COPD/asthma.  She is a current smoker currently smoking 10 cigarettes daily.  She also suffers from bad allergies.  She does endorse a cough daily with green sputum, however denies chest tightness, chest congestion or new or worsening dyspnea or shortness of breath.  Medication regimen includes Breztri and albuterol as needed.  She states overall her condition has remained stable.  She denies any new or worsening shortness of breath.  She continues to be followed by Dr. Wilson for Covid long hauler's.  She also had a most recent pulse oximetry overnight that did show significant desaturations, however she did not receive the supplemental oxygen as it the office visit was too old.  As of now both office visit and CNOX have .  So denies any hospitalizations within the last year.    PFT (3/18/2021):  Lung function testing done 2021 showed significant reduction of mid flows at 19% predicted.  FEV1 is low at 1.42 L, 49% predicted.  FEV1/FVC ratio is 57%.  Definite bronchodilator response.  Lung volumes demonstrate moderate hyperinflation, residual volume is 153%.  Oxygen transfer low at 82% suggests emphysema and flow volume loop confirms severe obstruction, good effort noted    Echocardiogram (8/10/2021):  Normal stress echocardiogram. Rest EF 70% with normal wall motion. All LV segments became appropriately hyperdynamic without wall motion abnormalities. Near obliteration of LV cavity with stress, normal RV size/function with stress. Poor exercise tolerance for age, 4 min 48  seconds, stopped due to shortness of    breath. Normal BP response to exercise.   Sinus rhythm. No ischemic ECG changes or arrhythmias. Rare PVCs. No chest pain during stress. Duke treadmill score 5, low risk (assume no prior CAD).      ROS: As per HPI and otherwise negative if not stated.    Past Medical History:   Diagnosis Date   • Arthritis    • Chronic airway obstruction, not elsewhere classified    • Dental disorder     dentures   • Grave's disease    • Hypertension    • IBS (irritable bowel syndrome)    • Pain     lower back   • Personal history of venous thrombosis and embolism 6/23/2009    right leg       Past Surgical History:   Procedure Laterality Date   • ACL RECONSTRUCTION SCOPE  7/9/2009    Performed by RENEE LEVI at SURGERY [facility] ORS   • LUMBAR FUSION POSTERIOR  2004   • PB DISKECTOMY,PERCUTANEOUS LUMBAR  2001   • HYSTERECTOMY, VAGINAL  1999   • TUBAL LIGATION  1994   • TONSILLECTOMY  1968       Family History   Problem Relation Age of Onset   • Cancer Maternal Grandmother         breast    • Cancer Maternal Aunt    • Cancer Mother         mm    • Cancer Father         colon cancer    • Hypertension Sister    • Diabetes Sister    • Cancer Maternal Grandfather         barretts/lung    • Cancer Paternal Grandmother    • Lung Disease Paternal Grandfather    • Cancer Paternal Grandfather         lung   • Cancer Other         hodkins (remission)    • Hypertension Brother    • Diabetes Brother        Social History     Socioeconomic History   • Marital status:      Spouse name: Not on file   • Number of children: Not on file   • Years of education: Not on file   • Highest education level: Not on file   Occupational History   • Not on file   Tobacco Use   • Smoking status: Current Every Day Smoker     Packs/day: 0.50     Years: 30.00     Pack years: 15.00     Types: Cigarettes     Start date: 1979   • Smokeless tobacco: Never Used   • Tobacco comment: ~10 cigarettes daily   Vaping Use  "  • Vaping Use: Former   Substance and Sexual Activity   • Alcohol use: No   • Drug use: No   • Sexual activity: Not Currently   Other Topics Concern   • Not on file   Social History Narrative   • Not on file     Social Determinants of Health     Financial Resource Strain:    • Difficulty of Paying Living Expenses: Not on file   Food Insecurity:    • Worried About Running Out of Food in the Last Year: Not on file   • Ran Out of Food in the Last Year: Not on file   Transportation Needs:    • Lack of Transportation (Medical): Not on file   • Lack of Transportation (Non-Medical): Not on file   Physical Activity:    • Days of Exercise per Week: Not on file   • Minutes of Exercise per Session: Not on file   Stress:    • Feeling of Stress : Not on file   Social Connections:    • Frequency of Communication with Friends and Family: Not on file   • Frequency of Social Gatherings with Friends and Family: Not on file   • Attends Sabianism Services: Not on file   • Active Member of Clubs or Organizations: Not on file   • Attends Club or Organization Meetings: Not on file   • Marital Status: Not on file   Intimate Partner Violence:    • Fear of Current or Ex-Partner: Not on file   • Emotionally Abused: Not on file   • Physically Abused: Not on file   • Sexually Abused: Not on file   Housing Stability:    • Unable to Pay for Housing in the Last Year: Not on file   • Number of Places Lived in the Last Year: Not on file   • Unstable Housing in the Last Year: Not on file       Allergies as of 11/10/2021 - Reviewed 11/10/2021   Allergen Reaction Noted   • Bee Anaphylaxis 07/07/2009   • Diphenoxylate Rash 11/19/2019   • Penicillins Shortness of Breath 07/07/2009   • Soap Hives 07/07/2009   • Tape Rash 07/07/2009   • Tetracycline Hives and Shortness of Breath 07/07/2009        Vitals:  /62 (BP Location: Left arm, Patient Position: Sitting, BP Cuff Size: Large adult)   Pulse 82   Resp 16   Ht 1.727 m (5' 8\")   Wt 102 kg (224 " lb)   SpO2 93%     Current medications as of today   Current Outpatient Medications   Medication Sig Dispense Refill   • BREZTRI AEROSPHERE 160-9-4.8 MCG/ACT Aerosol      • montelukast (SINGULAIR) 10 MG Tab      • ondansetron (ZOFRAN) 8 MG Tab      • traZODone (DESYREL) 100 MG Tab      • albuterol (PROVENTIL) 2.5mg/3ml Nebu Soln solution for nebulization      • metoprolol SR (TOPROL XL) 25 MG TABLET SR 24 HR TAKE 1 TABLET BY MOUTH ONCE DAILY FOR HEART AND BLOOD PRESSURE 90 Tablet 3   • atorvastatin (LIPITOR) 80 MG tablet Take 1 Tablet by mouth every day. 90 Tablet 3   • Olmesartan-amLODIPine-HCTZ 20-5-12.5 MG Tab Take 1 Delayed release capsule by mouth every day. 90 Tablet 3   • sumatriptan (IMITREX) 100 MG tablet Take 100 mg by mouth Once PRN for Migraine.     • pantoprazole (PROTONIX) 40 MG Tablet Delayed Response Take 40 mg by mouth every day.     • VENTOLIN  (90 Base) MCG/ACT Aero Soln inhalation aerosol Inhale 2 Puffs by mouth every four hours as needed for Shortness of Breath.  5   • dicyclomine (BENTYL) 20 MG Tab Take 20 mg by mouth every 8 hours as needed (For cramps).     • aspirin (ASA) 81 MG Chew Tab chewable tablet Take 81 mg by mouth every day.     • Melatonin 10 MG Tab Take 10 mg by mouth every bedtime.     • ondansetron (ZOFRAN ODT) 8 MG TABLET DISPERSIBLE Take 8 mg by mouth every 6 hours as needed for Nausea.  6   • promethazine (PHENERGAN) 25 MG Tab Take 25 mg by mouth every 6 hours as needed for Nausea/Vomiting.     • fluticasone (FLONASE) 50 MCG/ACT nasal spray Spray 1 Spray in nose every day.     • CYCLOBENZAPRINE HCL 10 MG PO TABS Take 1 Tab by mouth 3 times a day.     • HYDROCODONE-ACETAMINOPHEN  MG PO TABS Take 1 Tab by mouth 3 times a day.     • azithromycin (ZITHROMAX) 250 MG Tab Take 2 tablets on day 1, then take 1 tablet a day for 4 days. (Patient not taking: Reported on 11/10/2021) 6 tablet 1     No current facility-administered medications for this visit.         Physical  Exam:   Gen:           Alert and oriented, No apparent distress. Mood and affect appropriate, normal interaction with examiner.  Eyes:          PERRL, EOM intact, sclere white, conjunctive moist.  Ears:          Not examined. No lesions or deformities.  Hearing:     Grossly intact.  Nose:          Normal, no lesions or deformities.  Dentition:    Good dentition.  Oropharynx:   Tongue normal, posterior pharynx without erythema or exudate  Neck:        Supple, trachea midline, no masses.  Respiratory Effort: No intercostal retractions or use of accessory muscles.   Lung Auscultation:      Clear to auscultation bilaterally; no rales, rhonchi or wheezing.  CV:            Regular rate and rhythm. No murmurs, rubs or gallops.  Abd:           Not examined. Soft non tender, non distended. Normal active bowel sounds. No masses.  Lymphadenopathy: No palpable nodes or edema.  Gait and Station: Normal.  Digits and Nails: No clubbing, cyanosis, petechiae, or nodes.   Cranial Nerves: II-XII grossly intact.  Skin:        No rashes, lesions or ulcers noted.               Ext:           No cyanosis or edema.      Assessment:  1. Pneumonia due to COVID-19 virus  CT-CHEST (THORAX) W/O    DME Other    DME CNOX By [facility]    PULMONARY FUNCTION TESTS -Test requested: Complete Pulmonary Function Test    CANCELED: PULMONARY FUNCTION TESTS -Test requested: Complete Pulmonary Function Test   2. Pulmonary emphysema, unspecified emphysema type (HCC)  CT-CHEST (THORAX) W/O    DME Other    DME CNOX By [facility]    PULMONARY FUNCTION TESTS -Test requested: Complete Pulmonary Function Test    CANCELED: PULMONARY FUNCTION TESTS -Test requested: Complete Pulmonary Function Test   3. Nocturnal hypoxia  Overnight Home Sleep Study    DME CNOX By DME [facility]       Immunizations:    Flu: 10/14/2021  Pneumovax 23: 11/18/2016  COVID-19: 7/29/2021, 8/27/2021    Plan:  Plan is to continue using Breztri and albuterol as needed. Ordering Flutter valve to be used after  nebulizer treatment for chest congestion. We will  Repeat CNOX to re-establish need for nocturnal O2 .We will also order updated Ct chest and PFTs. Please obtain CNOX test and flutter valve [facility] [place] [id number]  Follow-up in 3 months    Please note that this dictation was created using voice recognition software. I have made every reasonable attempt to correct obvious errors, but it is possible there are errors of grammar and possibly content that I did not discover before finalizing the note.

## 2021-11-12 ENCOUNTER — TELEPHONE (OUTPATIENT)
Dept: CARDIOLOGY | Facility: MEDICAL CENTER | Age: 61
End: 2021-11-12

## 2021-11-12 NOTE — TELEPHONE ENCOUNTER
Pt dropped off disability paperwork on 11/9. Upon review, it doesn't appear to be paperwork that needs to be completed, but more so a list of her essential job functions. Discussed the potential of completing any necessary disability paperwork with BE - who advises that from cardiac standpoint pt is not disabiled. It is mostly long haul COVID symptoms, which would be more appropriately addressed by PCP or pulmonary.   Called pt and notified of this. She explains that she is already working on disability paperwork with her PCP, but Carmen MYLES is apparently requesing more medical records. She is overall very frustrated about her ongoing long haul COVID symptoms and the position it has put her in with work. Provided with sympathy and encouragement. Informed pt that we can forward the AN form requesting records to the medical records office, but that further disability discussions and paperwork should be addressed with her PCP. Faxed AN to medical records at [phone]. Receipt confirmed.

## 2021-11-18 ENCOUNTER — HOSPITAL ENCOUNTER (OUTPATIENT)
Dept: RADIOLOGY | Facility: MEDICAL CENTER | Age: 61
End: 2021-11-18
Attending: NURSE PRACTITIONER
Payer: COMMERCIAL

## 2021-11-18 ENCOUNTER — NON-PROVIDER VISIT (OUTPATIENT)
Dept: SLEEP MEDICINE | Facility: MEDICAL CENTER | Age: 61
End: 2021-11-18
Attending: NURSE PRACTITIONER
Payer: COMMERCIAL

## 2021-11-18 VITALS — BODY MASS INDEX: 34.06 KG/M2 | WEIGHT: 224 LBS

## 2021-11-18 DIAGNOSIS — J43.9 PULMONARY EMPHYSEMA, UNSPECIFIED EMPHYSEMA TYPE (HCC): ICD-10-CM

## 2021-11-18 DIAGNOSIS — U07.1 PNEUMONIA DUE TO COVID-19 VIRUS: ICD-10-CM

## 2021-11-18 DIAGNOSIS — J12.82 PNEUMONIA DUE TO COVID-19 VIRUS: ICD-10-CM

## 2021-11-18 PROCEDURE — 71250 CT THORAX DX C-: CPT

## 2021-11-18 PROCEDURE — 94060 EVALUATION OF WHEEZING: CPT | Performed by: INTERNAL MEDICINE

## 2021-11-18 PROCEDURE — 94726 PLETHYSMOGRAPHY LUNG VOLUMES: CPT | Performed by: INTERNAL MEDICINE

## 2021-11-18 PROCEDURE — 94729 DIFFUSING CAPACITY: CPT | Performed by: INTERNAL MEDICINE

## 2021-11-18 ASSESSMENT — PULMONARY FUNCTION TESTS
FVC_PERCENT_PREDICTED: 77
FEV1/FVC: 59
FEV1/FVC: 59
FEV1/FVC_PERCENT_CHANGE: 100
FEV1/FVC_PERCENT_PREDICTED: 75
FEV1_PERCENT_PREDICTED: 58
FVC_PERCENT_PREDICTED: 76
FVC: 2.76
FVC_PREDICTED: 3.55
FEV1/FVC_PERCENT_CHANGE: 0
FEV1_PERCENT_PREDICTED: 57
FEV1/FVC_PERCENT_PREDICTED: 78
FEV1_PERCENT_CHANGE: 1
FEV1/FVC_PERCENT_PREDICTED: 74
FEV1/FVC_PERCENT_PREDICTED: 75
FEV1: 1.6
FVC_LLN: 2.96
FEV1_PREDICTED: 2.78
FEV1_LLN: 2.32
FEV1/FVC_PREDICTED: 79
FVC: 2.71
FEV1/FVC_PERCENT_PREDICTED: 75
FEV1/FVC_PERCENT_LLN: 66
FEV1/FVC: 59
FEV1/FVC: 59.06
FEV1_PERCENT_CHANGE: 1
FEV1: 1.63

## 2021-11-18 NOTE — PROCEDURES
Technician: BELINDA Hu    Technician Comment:  Good patient effort & cooperation.  The results of this test meet the ATS/ERS standards for acceptability & reproducibility.  Test was performed on the Dripp[employer] Body Plethysmograph-Elite DX system.  Predicted values were GLI-2012 for spirometry, GLI-2017 for DLCO, ITS for Lung Volumes.  The DLCO was uncorrected for Hgb.  A bronchodilator of Ventolin HFA -2puffs via spacer administered.  DLCO performed during dilation period.    Interpretation:  1.  There is a moderate obstructive ventilatory defect on spirometry.  No significant response to bronchodilators.  FEV1 1.60L, 57% predicted.  2.  Air trapping evident on lung volumes.  3.  Normal diffusion capacity.  4.  Flow volume loop consistent with obstruction.  5. Compared to prior testing in March 2021, FEV1 has improved from 1.42 L to 1.60 L.  __________  Keron Spence MD  Pulmonary and Critical Care Medicine  [facility]

## 2021-11-30 ENCOUNTER — TELEPHONE (OUTPATIENT)
Dept: SLEEP MEDICINE | Facility: MEDICAL CENTER | Age: 61
End: 2021-11-30

## 2021-11-30 NOTE — TELEPHONE ENCOUNTER
HST was denied, although I'm not sure this was what we were trying to accomplish according the note.    Looks like we are doing an OPO to establish o2 needs.    pebp denied [id number] not medically necessary  p2p call [phone] ref# [id number]

## 2021-12-02 ENCOUNTER — TELEPHONE (OUTPATIENT)
Dept: SCHEDULING | Facility: IMAGING CENTER | Age: 61
End: 2021-12-02

## 2021-12-02 NOTE — TELEPHONE ENCOUNTER
Returned call. Pt follows with vascular surgery for her peripheral artery disease in her arms and has another appt with them on 12/7. She has had ongoing pain in her arms due to this, which vascular is aware of. She fell about a week ago and used her arm to catch herself. She is noticing pain in her shoulder joint and is unsure if it is her ongoing vascular pain, or if she injured her shoulder in her fall. She was wondering if we could order an xray to rule out a shoulder injury. Advised that it would be more appropriate for her to go through her PCP or an urgent care for shoulder injury treatment. She will plan on going to the urgent care tomorrow.

## 2021-12-02 NOTE — TELEPHONE ENCOUNTER
BE    Patient called to advise they fell last night and was looking to get an order for an xray due to the blockage they have 100% in left arm and 75% in right arm. Pt is not sure if the pain is elma to the blockage or the fall. They can be reached at [phone].    Thank you,  Aminata ALMANZAR

## 2021-12-03 ENCOUNTER — HOSPITAL ENCOUNTER (OUTPATIENT)
Dept: RADIOLOGY | Facility: MEDICAL CENTER | Age: 61
End: 2021-12-03
Attending: SURGERY
Payer: COMMERCIAL

## 2021-12-03 DIAGNOSIS — I73.9 PERIPHERAL VASCULAR DISEASE, UNSPECIFIED (HCC): ICD-10-CM

## 2021-12-03 PROCEDURE — 93931 UPPER EXTREMITY STUDY: CPT | Mod: 26,RT | Performed by: SURGERY

## 2021-12-03 PROCEDURE — 93931 UPPER EXTREMITY STUDY: CPT | Mod: RT

## 2021-12-18 ENCOUNTER — OFFICE VISIT (OUTPATIENT)
Dept: URGENT CARE | Facility: PHYSICIAN GROUP | Age: 61
End: 2021-12-18
Payer: COMMERCIAL

## 2021-12-18 ENCOUNTER — HOSPITAL ENCOUNTER (OUTPATIENT)
Dept: RADIOLOGY | Facility: MEDICAL CENTER | Age: 61
End: 2021-12-18
Attending: STUDENT IN AN ORGANIZED HEALTH CARE EDUCATION/TRAINING PROGRAM
Payer: COMMERCIAL

## 2021-12-18 VITALS
DIASTOLIC BLOOD PRESSURE: 76 MMHG | WEIGHT: 228.4 LBS | OXYGEN SATURATION: 95 % | RESPIRATION RATE: 16 BRPM | TEMPERATURE: 96.5 F | SYSTOLIC BLOOD PRESSURE: 118 MMHG | BODY MASS INDEX: 34.62 KG/M2 | HEIGHT: 68 IN | HEART RATE: 95 BPM

## 2021-12-18 DIAGNOSIS — M25.512 ACUTE PAIN OF LEFT SHOULDER: ICD-10-CM

## 2021-12-18 DIAGNOSIS — J01.00 ACUTE NON-RECURRENT MAXILLARY SINUSITIS: ICD-10-CM

## 2021-12-18 PROCEDURE — 73030 X-RAY EXAM OF SHOULDER: CPT | Mod: LT

## 2021-12-18 PROCEDURE — 99213 OFFICE O/P EST LOW 20 MIN: CPT | Performed by: STUDENT IN AN ORGANIZED HEALTH CARE EDUCATION/TRAINING PROGRAM

## 2021-12-18 RX ORDER — KETOROLAC TROMETHAMINE 30 MG/ML
30 INJECTION, SOLUTION INTRAMUSCULAR; INTRAVENOUS ONCE
Status: COMPLETED | OUTPATIENT
Start: 2021-12-18 | End: 2021-12-18

## 2021-12-18 RX ORDER — DICLOFENAC SODIUM 30 MG/G
0.5 GEL TOPICAL
Qty: 100 G | Refills: 0 | Status: SHIPPED | OUTPATIENT
Start: 2021-12-18

## 2021-12-18 RX ORDER — DEXAMETHASONE SODIUM PHOSPHATE 10 MG/ML
10 INJECTION INTRAMUSCULAR; INTRAVENOUS ONCE
Status: COMPLETED | OUTPATIENT
Start: 2021-12-18 | End: 2021-12-18

## 2021-12-18 RX ORDER — LIDOCAINE 50 MG/G
1 PATCH TOPICAL EVERY 24 HOURS
Qty: 10 PATCH | Refills: 0 | Status: SHIPPED | OUTPATIENT
Start: 2021-12-18 | End: 2021-12-18

## 2021-12-18 RX ORDER — LIDOCAINE 50 MG/G
1 PATCH TOPICAL EVERY 24 HOURS
Qty: 10 PATCH | Refills: 0 | Status: SHIPPED | OUTPATIENT
Start: 2021-12-18

## 2021-12-18 RX ORDER — AZITHROMYCIN 250 MG/1
TABLET, FILM COATED ORAL
Qty: 6 TABLET | Refills: 0 | Status: SHIPPED | OUTPATIENT
Start: 2021-12-18 | End: 2022-12-05

## 2021-12-18 RX ADMIN — DEXAMETHASONE SODIUM PHOSPHATE 10 MG: 10 INJECTION INTRAMUSCULAR; INTRAVENOUS at 17:10

## 2021-12-18 RX ADMIN — KETOROLAC TROMETHAMINE 30 MG: 30 INJECTION, SOLUTION INTRAMUSCULAR; INTRAVENOUS at 17:11

## 2021-12-19 NOTE — PROGRESS NOTES
"Subjective     Anette Jayleen Yost is a 61 y.o. female who presents with Pain (shoulder x3 weeks)            Patient is a 61-year-old female with a history of chronic pain involving her lower back in addition to knee for which she sees a pain management specialist and has a pain contract already established.  Patient was in her usual state of health when she was on a ladder and fell injuring her left shoulder patient presents clinic for further evaluation and x-ray imaging.      Review of Systems   Musculoskeletal: Positive for joint pain.        Left shoulder pain              Objective     /76 (BP Location: Right arm, Patient Position: Sitting, BP Cuff Size: Adult)   Pulse 95   Temp 35.8 °C (96.5 °F) (Temporal)   Resp 16   Ht 1.727 m (5' 8\")   Wt 104 kg (228 lb 6.4 oz)   SpO2 95%   BMI 34.73 kg/m²      Physical Exam  Musculoskeletal:      Comments: Limited range of motion left shoulder pain with initiation of abduction in addition to front arm raise.  Tenderness palpation along lateral aspect of the shoulder.                             Assessment & Plan        1. Acute pain of left shoulder  Patient status post ground-level fall off ladder for which she was almost off.  Imaging of the shoulder shows no evidence of acute fracture or subluxation.  Likely is represents rotator cuff injury.  Conservative management indicated as initial step.  Plan:  1.  30 mg Toradol IM to be given in clinic in addition to Decadron 10 mg IM.  2.  Lidocaine patch to be applied as indicated  3.  Diclofenac cream to be applied as directed  4.  Referral to sports medicine for further evaluation continuity of care.    2.  Sinusitis  Patient has complaints of sinusitis which she gets frequently and is resolved by azithromycin Z-Noam.  Plan:  1.  Z-Noam electronically sent to her pharmacy of choice.    - DX-SHOULDER 2+ LEFT; Future  - ketorolac (TORADOL) injection 30 mg  - dexamethasone (DECADRON) injection (check route " below) 10 mg  - lidocaine (LIDODERM) 5 % Patch; Place 1 Patch on the skin every 24 hours.  Dispense: 10 Patch; Refill: 0

## 2022-10-03 ENCOUNTER — TELEPHONE (OUTPATIENT)
Dept: CARDIOLOGY | Facility: MEDICAL CENTER | Age: 62
End: 2022-10-03
Payer: COMMERCIAL

## 2022-10-03 DIAGNOSIS — E78.5 DYSLIPIDEMIA: ICD-10-CM

## 2022-10-03 DIAGNOSIS — I10 ESSENTIAL HYPERTENSION, BENIGN: ICD-10-CM

## 2022-10-03 RX ORDER — METOPROLOL SUCCINATE 25 MG/1
25 TABLET, EXTENDED RELEASE ORAL EVERY EVENING
Qty: 90 TABLET | Refills: 0 | Status: SHIPPED | OUTPATIENT
Start: 2022-10-03 | End: 2022-12-05 | Stop reason: SDUPTHER

## 2022-10-03 RX ORDER — OLMESARTAN MEDOXOMIL, AMLODIPINE AND HYDROCHLOROTHIAZIDE TABLET 20/5/12.5 MG 20; 5; 12.5 MG/1; MG/1; MG/1
1 TABLET ORAL DAILY
Qty: 90 TABLET | Refills: 0 | Status: SHIPPED | OUTPATIENT
Start: 2022-10-03 | End: 2022-12-05 | Stop reason: SDUPTHER

## 2022-10-03 NOTE — TELEPHONE ENCOUNTER
BE    Caller: Jc Cheema    Medication Name and Dosage:    metoprolol SR (TOPROL XL) 25 MG TABLET SR 24 HR [[id number]]    Olmesartan-amLODIPine-HCTZ 20-5-12.5 MG Tab [[id number]]      Did patient contact pharmacy (If no, please call pharmacy first): - yes    Medication amount left: 0    Preferred Pharmacy: [facility]    Other questions (Topic): - N?A    Callback Number (Will only call for issues):  [phone]    Thank you,   Tiffanie KEITA

## 2022-10-04 NOTE — TELEPHONE ENCOUNTER
Lab orders placed and put in mail for patient. RX's sent to preferred pharmacy on file. Spoke to patient over phone and gave update. Patient verbalizes understanding.

## 2022-12-05 ENCOUNTER — OFFICE VISIT (OUTPATIENT)
Dept: CARDIOLOGY | Facility: MEDICAL CENTER | Age: 62
End: 2022-12-05
Payer: COMMERCIAL

## 2022-12-05 ENCOUNTER — HOSPITAL ENCOUNTER (OUTPATIENT)
Dept: RADIOLOGY | Facility: MEDICAL CENTER | Age: 62
End: 2022-12-05
Attending: PHYSICAL MEDICINE & REHABILITATION
Payer: COMMERCIAL

## 2022-12-05 VITALS
DIASTOLIC BLOOD PRESSURE: 62 MMHG | WEIGHT: 219.4 LBS | HEART RATE: 88 BPM | OXYGEN SATURATION: 92 % | HEIGHT: 68 IN | BODY MASS INDEX: 33.25 KG/M2 | SYSTOLIC BLOOD PRESSURE: 114 MMHG

## 2022-12-05 DIAGNOSIS — R06.09 DYSPNEA ON EXERTION: ICD-10-CM

## 2022-12-05 DIAGNOSIS — U09.9 COVID-19 LONG HAULER: ICD-10-CM

## 2022-12-05 DIAGNOSIS — M75.52 BURSITIS OF LEFT SHOULDER: ICD-10-CM

## 2022-12-05 DIAGNOSIS — J45.30 MILD PERSISTENT ASTHMA WITHOUT COMPLICATION: ICD-10-CM

## 2022-12-05 DIAGNOSIS — M51.16 NEURITIS OR RADICULITIS DUE TO RUPTURE OF LUMBAR INTERVERTEBRAL DISC: ICD-10-CM

## 2022-12-05 DIAGNOSIS — E78.5 DYSLIPIDEMIA: ICD-10-CM

## 2022-12-05 DIAGNOSIS — I73.9 PAD (PERIPHERAL ARTERY DISEASE) (HCC): ICD-10-CM

## 2022-12-05 DIAGNOSIS — I10 ESSENTIAL HYPERTENSION, BENIGN: ICD-10-CM

## 2022-12-05 PROCEDURE — 72148 MRI LUMBAR SPINE W/O DYE: CPT

## 2022-12-05 PROCEDURE — 99214 OFFICE O/P EST MOD 30 MIN: CPT | Performed by: NURSE PRACTITIONER

## 2022-12-05 PROCEDURE — 73221 MRI JOINT UPR EXTREM W/O DYE: CPT | Mod: LT

## 2022-12-05 RX ORDER — OLMESARTAN MEDOXOMIL, AMLODIPINE AND HYDROCHLOROTHIAZIDE TABLET 20/5/12.5 MG 20; 5; 12.5 MG/1; MG/1; MG/1
1 TABLET ORAL DAILY
Qty: 100 TABLET | Refills: 3 | Status: SHIPPED | OUTPATIENT
Start: 2022-12-05 | End: 2023-10-19 | Stop reason: SDUPTHER

## 2022-12-05 RX ORDER — METOPROLOL SUCCINATE 25 MG/1
25 TABLET, EXTENDED RELEASE ORAL EVERY EVENING
Qty: 100 TABLET | Refills: 3 | Status: SHIPPED | OUTPATIENT
Start: 2022-12-05 | End: 2023-12-08

## 2022-12-05 RX ORDER — ATORVASTATIN CALCIUM 80 MG/1
80 TABLET, FILM COATED ORAL DAILY
Qty: 100 TABLET | Refills: 3 | Status: SHIPPED | OUTPATIENT
Start: 2022-12-05 | End: 2023-10-19 | Stop reason: SDUPTHER

## 2022-12-06 ASSESSMENT — ENCOUNTER SYMPTOMS
LOSS OF CONSCIOUSNESS: 0
CHILLS: 0
HEADACHES: 0
ORTHOPNEA: 0
PND: 0
PALPITATIONS: 0
DIZZINESS: 0
COUGH: 0
MYALGIAS: 0
BRUISES/BLEEDS EASILY: 0
SHORTNESS OF BREATH: 1
NAUSEA: 0
INSOMNIA: 0
FEVER: 0
ABDOMINAL PAIN: 0

## 2022-12-06 NOTE — PROGRESS NOTES
Chief Complaint   Patient presents with    Follow-Up    Shortness of Breath           HTN (Controlled)    Hyperlipidemia       Subjective     Anette Yost is a 62 y.o. female who presents today for annual follow-up of shortness of breath due to long-Covid, PAD, HTN and hyperlipidemia.    Anette is a 62 year old female with history of hypertension, hyperlipidemia, tobacco use, PAD (subclavians and ICAs), and long-Covid symptoms, last seen by Dr. Mauricio in September 2021. She is here today for annual follow-up. She was referred to Vascular Surgery in March 2021, but I cannot see if there was every follow-up. She last saw pulmonology in November 2021.    She is still smoking, 10-20 cigarettes per day. She does have ongoing shortness of breath. No overt chest pain, pressure or discomfort; no palpitations; no orthopnea or PND; some mild dizziness; no LE edema. She does have a lot of peripheral neuropathy/sensitivity and fatigue.    Past Medical History:   Diagnosis Date    Arthritis     Chronic airway obstruction, not elsewhere classified     Dental disorder     dentures    Grave's disease     Hyperlipidemia     Hypertension     IBS (irritable bowel syndrome)     PAD (peripheral artery disease) ([facility]) 04/2021    CTA of chest with occlusions of left subclavian artery, 50-70% of proximal right subclavian artery, 50-75% stenosis of left vertebral artery, 50% stenosis of LICA    Pain     lower back    Personal history of venous thrombosis and embolism 06/23/2009    right leg     Past Surgical History:   Procedure Laterality Date    RECONSTRUCTION, KNEE, ACL, ARTHROSCOPIC  7/9/2009    Performed by RENEE LEVI at SURGERY [facility]    FUSION, SPINE, LUMBAR, PLIF  2004    PB DISKECTOMY,PERCUTANEOUS LUMBAR  2001    HYSTERECTOMY, VAGINAL  1999    TUBAL LIGATION  1994    TONSILLECTOMY  1968     Family History   Problem Relation Age of Onset    Cancer Maternal Grandmother         breast     Cancer Maternal Aunt      Cancer Mother         mm     Cancer Father         colon cancer     Hypertension Sister     Diabetes Sister     Cancer Maternal Grandfather         barretts/lung     Cancer Paternal Grandmother     Lung Disease Paternal Grandfather     Cancer Paternal Grandfather         lung    Cancer Other         hodkins (remission)     Hypertension Brother     Diabetes Brother      Social History     Socioeconomic History    Marital status:      Spouse name: Not on file    Number of children: Not on file    Years of education: Not on file    Highest education level: Not on file   Occupational History    Not on file   Tobacco Use    Smoking status: Every Day     Packs/day: 1.00     Years: 30.00     Pack years: 30.00     Types: Cigarettes     Start date: 1979    Smokeless tobacco: Never    Tobacco comments:     10-20 cigarettes daily   Vaping Use    Vaping Use: Former   Substance and Sexual Activity    Alcohol use: No    Drug use: No    Sexual activity: Not Currently   Other Topics Concern    Not on file   Social History Narrative    Not on file     Social Determinants of Health     Financial Resource Strain: Not on file   Food Insecurity: Not on file   Transportation Needs: Not on file   Physical Activity: Not on file   Stress: Not on file   Social Connections: Not on file   Intimate Partner Violence: Not on file   Housing Stability: Not on file     Allergies   Allergen Reactions    Bee Anaphylaxis    Diphenoxylate Rash     .    Penicillins Shortness of Breath    Soap Hives    Tape Rash     .    Tetracycline Hives and Shortness of Breath     Outpatient Encounter Medications as of 12/5/2022   Medication Sig Dispense Refill    atorvastatin (LIPITOR) 80 MG tablet Take 1 Tablet by mouth every day. 100 Tablet 3    metoprolol SR (TOPROL XL) 25 MG TABLET SR 24 HR Take 1 Tablet by mouth every evening. 100 Tablet 3    Olmesartan-amLODIPine-HCTZ 20-5-12.5 MG Tab Take 1 Delayed release capsule by mouth every day. 100 Tablet 3     diclofenac sodium 3 % Gel Apply 0.5 cm topically 1 time a day as needed. 100 g 0    lidocaine (LIDODERM) 5 % Patch Place 1 Patch on the skin every 24 hours. 10 Patch 0    BREZTRI AEROSPHERE 160-9-4.8 MCG/ACT Aerosol 2 times a day.      montelukast (SINGULAIR) 10 MG Tab       traZODone (DESYREL) 100 MG Tab       albuterol (PROVENTIL) 2.5mg/3ml Nebu Soln solution for nebulization       sumatriptan (IMITREX) 100 MG tablet Take 100 mg by mouth Once PRN for Migraine.      VENTOLIN  (90 Base) MCG/ACT Aero Soln inhalation aerosol Inhale 2 Puffs by mouth every four hours as needed for Shortness of Breath.  5    dicyclomine (BENTYL) 20 MG Tab Take 20 mg by mouth every 8 hours as needed (For cramps).      aspirin (ASA) 81 MG Chew Tab chewable tablet Take 81 mg by mouth every day.      ondansetron (ZOFRAN ODT) 8 MG TABLET DISPERSIBLE Take 8 mg by mouth every 6 hours as needed for Nausea.  6    promethazine (PHENERGAN) 25 MG Tab Take 25 mg by mouth every 6 hours as needed for Nausea/Vomiting.      fluticasone (FLONASE) 50 MCG/ACT nasal spray Spray 1 Spray in nose every day.      CYCLOBENZAPRINE HCL 10 MG PO TABS Take 1 Tab by mouth 3 times a day.      HYDROCODONE-ACETAMINOPHEN  MG PO TABS Take 1 Tab by mouth 3 times a day.      [DISCONTINUED] metoprolol SR (TOPROL XL) 25 MG TABLET SR 24 HR Take 1 Tablet by mouth every evening. 90 Tablet 0    [DISCONTINUED] Olmesartan-amLODIPine-HCTZ 20-5-12.5 MG Tab Take 1 Delayed release capsule by mouth every day. 90 Tablet 0    [DISCONTINUED] azithromycin (ZITHROMAX) 250 MG Tab Take 2 tablets on day 1 then for the next 4 days 1 tablet every day by mouth (Patient not taking: Reported on 12/5/2022) 6 Tablet 0    [DISCONTINUED] ondansetron (ZOFRAN) 8 MG Tab  (Patient not taking: Reported on 12/5/2022)      [DISCONTINUED] atorvastatin (LIPITOR) 80 MG tablet Take 1 Tablet by mouth every day. 90 Tablet 3    [DISCONTINUED] pantoprazole (PROTONIX) 40 MG Tablet Delayed Response Take 40  "mg by mouth every day. (Patient not taking: Reported on 12/5/2022)      [DISCONTINUED] Melatonin 10 MG Tab Take 10 mg by mouth every bedtime. (Patient not taking: Reported on 12/5/2022)       No facility-administered encounter medications on file as of 12/5/2022.     Review of Systems   Constitutional:  Positive for malaise/fatigue. Negative for chills and fever.   HENT:  Negative for congestion.    Respiratory:  Positive for shortness of breath. Negative for cough.    Cardiovascular:  Negative for chest pain, palpitations, orthopnea, leg swelling and PND.   Gastrointestinal:  Negative for abdominal pain and nausea.   Musculoskeletal:  Negative for myalgias.   Skin:  Negative for rash.   Neurological:  Negative for dizziness, loss of consciousness and headaches.   Endo/Heme/Allergies:  Does not bruise/bleed easily.   Psychiatric/Behavioral:  The patient does not have insomnia.             Objective     /62 (BP Location: Left arm, Patient Position: Sitting, BP Cuff Size: Adult)   Pulse 88   Ht 1.727 m (5' 8\")   Wt 99.5 kg (219 lb 6.4 oz)   SpO2 92%   BMI 33.36 kg/m²     Physical Exam  Constitutional:       Appearance: She is well-developed.   HENT:      Head: Normocephalic.   Neck:      Vascular: No JVD.   Cardiovascular:      Rate and Rhythm: Normal rate and regular rhythm.      Heart sounds: Normal heart sounds.   Pulmonary:      Effort: Pulmonary effort is normal. No respiratory distress.      Breath sounds: Normal breath sounds. No wheezing or rales.   Abdominal:      General: Bowel sounds are normal. There is no distension.      Palpations: Abdomen is soft.      Tenderness: There is no abdominal tenderness.   Musculoskeletal:         General: Normal range of motion.      Cervical back: Normal range of motion and neck supple.   Skin:     General: Skin is warm and dry.      Findings: No rash.   Neurological:      Mental Status: She is alert and oriented to person, place, and time.   Psychiatric:         " Mood and Affect: Mood normal.         Behavior: Behavior normal.     Conclusions of Right UE US of 12/3/2021:   Severe (>75%) right subclavian arterial stenosis, based on flow veocity    criteria; however, normal triphasic flow is maintained throughout the right    upper extremity arterial  system.    CONCLUSIONS OF STRESS ECHOCARDIOGRAM OF 8/10/2021:  Fair quality study.   Normal stress echocardiogram.   Rest EF 70% with normal wall motion.   All LV segments became appropriately hyperdynamic without wall motion   abnormalities.   Near obliteration of LV cavity with stress, normal RV size/function with   stress.   Poor exercise tolerance for age, 4 min 48 seconds, stopped due to shortness of    breath.   Normal BP response to exercise.   Sinus rhythm.   No ischemic ECG changes or arrhythmias. Rare PVCs.   No chest pain during stress.   Duke treadmill score 5, low risk (assume no prior CAD).      IMPRESSION OF CT SCAN OF CHEST OF 11/18/2021:  1.  3 mm right upper lobe subpleural nodule. Doubtful significance. No further imaging follow-up is indicated.  2.  No pulmonary consolidation or infiltrates or suspicious pulmonary nodules.    IMPRESSION OF CTA OF NECK OF 4/10/2021:  1.  Occlusion of the left subclavian artery at its origin with collateral reconstitution just beyond the left thyrocervical trunk origin.  2.  Approximately 50-70% focal stenosis of the proximal right subclavian artery just beyond the origin of the right thyrocervical trunk.  3.  Estimated 50-75% stenosis at the origin of the left vertebral artery.  4.  Patent right innominate artery and bilateral common carotid arteries.  5.  Approximately 50% focal stenosis at the origin of the left internal carotid artery.  6.  Approximately 20% focal narrowing at the origin of the right internal carotid artery.  7.  Patent right vertebral artery.    LABS AS OF 10/21/2022:  WBC 8.0  RBC 4.07  Hgb 13.3  Hc 38.9  Platelets 230  Potassium 4.0  BUN 14  Glucose  113  Creatinine 1.3  AST 16  ALT 22  GFR 44  Cholesterol 224  Triglycerides 219  HDL 51    Cholesterol/HDL ratio 4.4    Assessment & Plan     1. Dyspnea on exertion  EC-ECHOCARDIOGRAM COMPLETE W/O CONT      2. COVID-19 long hauler  CT-CHEST (THORAX) W/O      3. Essential hypertension, benign  EC-ECHOCARDIOGRAM COMPLETE W/O CONT    metoprolol SR (TOPROL XL) 25 MG TABLET SR 24 HR    Olmesartan-amLODIPine-HCTZ 20-5-12.5 MG Tab      4. Dyslipidemia        5. Mild persistent asthma without complication  CT-CHEST (THORAX) W/O      6. PAD (peripheral artery disease) ([facility])  CT-CTA NECK WITH & W/O-POST PROCESSING          Medical Decision Making: Today's Assessment/Status/Plan:      1. Dyspnea on exertion, ongoing, with long-Covid symptoms. She has seen pulmonology in the past, but last visit was November 2021. Will repeat echocardiogram and CT scan of lungs.    2. Hypertension, treated with TriBenZocr and Metoprolol. As above, to repeat echocardiogram.    3. Hyperlipidemia, just restarted Lipitor 80mg. Lipids are not at goal, especially given PVD.     4. Mild asthma, with smoking history. She is not interested in quitting smoking right now; she is urged to try to cut down.    5. PAD, subclavian and ICA stenosis, to repeat CTA of the neck; will probably need another referral to Vascular Surgery.    As above, echocardiogram, CT of chest and CTA of neck. Follow-up to be determined based on these results. Medications are renewed today. Urged to keep working on cutting down on smoking.

## 2022-12-07 ENCOUNTER — TELEPHONE (OUTPATIENT)
Dept: CARDIOLOGY | Facility: MEDICAL CENTER | Age: 62
End: 2022-12-07

## 2022-12-07 ENCOUNTER — TELEPHONE (OUTPATIENT)
Dept: CARDIOLOGY | Facility: MEDICAL CENTER | Age: 62
End: 2022-12-07
Payer: COMMERCIAL

## 2022-12-07 NOTE — TELEPHONE ENCOUNTER
AB        Caller: Anette Yost      Topic/issue: Patient was calling to see if there could be an order put in for her to complete an angiogram with contrast as per what was discuss with her the last time she had an appointment      Callback Number: [phone]      Thank you      -Elliot JENKINS

## 2022-12-07 NOTE — TELEPHONE ENCOUNTER
AB    Caller: Anette Yost     Topic/issue: Pt would like to have Lorazepam 1.0 MG for her appt's for the echo and ct scans that are coming up for her claustrophobia.      Callback Number: [phone] (home) [phone] (work)

## 2022-12-08 NOTE — TELEPHONE ENCOUNTER
Reviewed last OV note with AB, do not see notation of angiogram discussed. Pt has echocardiogram scheduled for March 2023, please advise.

## 2022-12-09 DIAGNOSIS — F41.9 ANXIETY: ICD-10-CM

## 2022-12-09 RX ORDER — LORAZEPAM 1 MG/1
1 TABLET ORAL DAILY
Qty: 3 TABLET | Refills: 0 | Status: SHIPPED | OUTPATIENT
Start: 2022-12-09 | End: 2022-12-12

## 2022-12-09 NOTE — TELEPHONE ENCOUNTER
I sent 3 tablets (doses) of Lorazepam, to be taken prior to each imaging study to the pharmacy listed.  Thanks, AB

## 2022-12-09 NOTE — TELEPHONE ENCOUNTER
Where does she want this Rx sent?    (Please explain that during echo, patient is NOT in a tube.)    Thanks, AB

## 2022-12-09 NOTE — TELEPHONE ENCOUNTER
S/W pt, discussed Echo and CT scans, she states she has felt claustrophobic with CT's in the past, understands that this test is not the same as MRI. Pt asking that med go to [facility] in [place], this is in chart.

## 2022-12-09 NOTE — TELEPHONE ENCOUNTER
S/W pt, informed as stated by AB. Pt does have all 3 tests scheduled for March. Pt verbalizes understanding that further testing will be based on the results of her already scheduled tests. Nothing further needed.

## 2022-12-09 NOTE — TELEPHONE ENCOUNTER
I ordered a echo, CT scan of chest and CTA of neck/subclavians (all in my note). We'll start with these please.    Thanks, AB

## 2023-03-28 ENCOUNTER — HOSPITAL ENCOUNTER (OUTPATIENT)
Dept: LAB | Facility: MEDICAL CENTER | Age: 63
End: 2023-03-28
Attending: INTERNAL MEDICINE
Payer: COMMERCIAL

## 2023-03-28 DIAGNOSIS — E78.5 DYSLIPIDEMIA: ICD-10-CM

## 2023-03-28 DIAGNOSIS — I10 ESSENTIAL HYPERTENSION, BENIGN: ICD-10-CM

## 2023-03-28 LAB
ANION GAP SERPL CALC-SCNC: 14 MMOL/L (ref 7–16)
BUN SERPL-MCNC: 14 MG/DL (ref 8–22)
CALCIUM SERPL-MCNC: 9.1 MG/DL (ref 8.5–10.5)
CHLORIDE SERPL-SCNC: 99 MMOL/L (ref 96–112)
CHOLEST SERPL-MCNC: 167 MG/DL (ref 100–199)
CO2 SERPL-SCNC: 24 MMOL/L (ref 20–33)
CREAT SERPL-MCNC: 1.15 MG/DL (ref 0.5–1.4)
FASTING STATUS PATIENT QL REPORTED: NORMAL
GFR SERPLBLD CREATININE-BSD FMLA CKD-EPI: 54 ML/MIN/1.73 M 2
GLUCOSE SERPL-MCNC: 111 MG/DL (ref 65–99)
HDLC SERPL-MCNC: 45 MG/DL
LDLC SERPL CALC-MCNC: 70 MG/DL
POTASSIUM SERPL-SCNC: 4 MMOL/L (ref 3.6–5.5)
SODIUM SERPL-SCNC: 137 MMOL/L (ref 135–145)
TRIGL SERPL-MCNC: 258 MG/DL (ref 0–149)

## 2023-03-28 PROCEDURE — 36415 COLL VENOUS BLD VENIPUNCTURE: CPT

## 2023-03-28 PROCEDURE — 80061 LIPID PANEL: CPT

## 2023-03-28 PROCEDURE — 80048 BASIC METABOLIC PNL TOTAL CA: CPT

## 2023-03-30 ENCOUNTER — HOSPITAL ENCOUNTER (OUTPATIENT)
Dept: RADIOLOGY | Facility: MEDICAL CENTER | Age: 63
End: 2023-03-30
Attending: NURSE PRACTITIONER
Payer: COMMERCIAL

## 2023-03-30 ENCOUNTER — HOSPITAL ENCOUNTER (OUTPATIENT)
Dept: CARDIOLOGY | Facility: MEDICAL CENTER | Age: 63
End: 2023-03-30
Attending: NURSE PRACTITIONER
Payer: COMMERCIAL

## 2023-03-30 DIAGNOSIS — I73.9 PAD (PERIPHERAL ARTERY DISEASE) (HCC): ICD-10-CM

## 2023-03-30 DIAGNOSIS — J45.30 MILD PERSISTENT ASTHMA WITHOUT COMPLICATION: ICD-10-CM

## 2023-03-30 DIAGNOSIS — I10 ESSENTIAL HYPERTENSION, BENIGN: ICD-10-CM

## 2023-03-30 DIAGNOSIS — R06.09 DYSPNEA ON EXERTION: ICD-10-CM

## 2023-03-30 DIAGNOSIS — U09.9 COVID-19 LONG HAULER: ICD-10-CM

## 2023-03-30 LAB
LV EJECT FRACT  99904: 70
LV EJECT FRACT MOD 2C 99903: 47.47
LV EJECT FRACT MOD 4C 99902: 63.03
LV EJECT FRACT MOD BP 99901: 55.47

## 2023-03-30 PROCEDURE — 93306 TTE W/DOPPLER COMPLETE: CPT

## 2023-03-30 PROCEDURE — 71250 CT THORAX DX C-: CPT

## 2023-03-30 PROCEDURE — 93306 TTE W/DOPPLER COMPLETE: CPT | Mod: 26 | Performed by: INTERNAL MEDICINE

## 2023-03-30 PROCEDURE — 700117 HCHG RX CONTRAST REV CODE 255: Performed by: NURSE PRACTITIONER

## 2023-03-30 PROCEDURE — 70498 CT ANGIOGRAPHY NECK: CPT

## 2023-03-30 RX ADMIN — IOHEXOL 100 ML: 350 INJECTION, SOLUTION INTRAVENOUS at 15:06

## 2023-05-23 ENCOUNTER — HOSPITAL ENCOUNTER (OUTPATIENT)
Dept: RADIOLOGY | Facility: MEDICAL CENTER | Age: 63
End: 2023-05-23
Payer: COMMERCIAL

## 2023-05-23 DIAGNOSIS — M54.50 LUMBAR PAIN: ICD-10-CM

## 2023-05-23 PROCEDURE — 72131 CT LUMBAR SPINE W/O DYE: CPT

## 2023-10-19 ENCOUNTER — TELEPHONE (OUTPATIENT)
Dept: CARDIOLOGY | Facility: MEDICAL CENTER | Age: 63
End: 2023-10-19

## 2023-10-19 DIAGNOSIS — I10 ESSENTIAL HYPERTENSION, BENIGN: ICD-10-CM

## 2023-10-19 DIAGNOSIS — E78.5 DYSLIPIDEMIA: ICD-10-CM

## 2023-10-19 RX ORDER — OLMESARTAN MEDOXOMIL, AMLODIPINE AND HYDROCHLOROTHIAZIDE TABLET 20/5/12.5 MG 20; 5; 12.5 MG/1; MG/1; MG/1
1 TABLET ORAL DAILY
Qty: 90 TABLET | Refills: 0 | Status: SHIPPED | OUTPATIENT
Start: 2023-10-19 | End: 2024-01-11

## 2023-10-19 RX ORDER — ATORVASTATIN CALCIUM 80 MG/1
80 TABLET, FILM COATED ORAL DAILY
Qty: 90 TABLET | Refills: 0 | Status: SHIPPED | OUTPATIENT
Start: 2023-10-19 | End: 2024-01-23 | Stop reason: SDUPTHER

## 2023-10-19 NOTE — TELEPHONE ENCOUNTER
Reviewed last OV note and most recent lab results. Courtesy refills sent for 90 day supply. Note to scheduling to call pt for appt, due around December for yearly.

## 2023-10-19 NOTE — TELEPHONE ENCOUNTER
BE     Caller: Anette Yost     Medication Name and Dosage:   Olmesartan-amLODIPine-HCTZ 20-5-12.5 MG Tab  atorvastatin (LIPITOR) 80 MG tablet    Medication amount left: 2 Days left     Preferred Pharmacy: [id number] [place] [address] [address] [facility]    Callback Number (Will only call for issues): [phone]    Thank you   Shanell FROST

## 2023-10-30 ENCOUNTER — TELEPHONE (OUTPATIENT)
Dept: CARDIOLOGY | Facility: MEDICAL CENTER | Age: 63
End: 2023-10-30
Payer: COMMERCIAL

## 2023-10-30 NOTE — TELEPHONE ENCOUNTER
[facility]. Unable to LVM. Sent MyChart. /cg 10/30/23

## 2023-12-08 DIAGNOSIS — I10 ESSENTIAL HYPERTENSION, BENIGN: ICD-10-CM

## 2023-12-08 RX ORDER — METOPROLOL SUCCINATE 25 MG/1
25 TABLET, EXTENDED RELEASE ORAL DAILY
Qty: 90 TABLET | Refills: 0 | Status: SHIPPED | OUTPATIENT
Start: 2023-12-08 | End: 2024-01-23 | Stop reason: SDUPTHER

## 2024-01-10 DIAGNOSIS — I10 ESSENTIAL HYPERTENSION, BENIGN: ICD-10-CM

## 2024-01-10 NOTE — TELEPHONE ENCOUNTER
Is the patient due for a refill? Yes    Was the patient seen the past year? No    Date of last office visit: 12/05/2022    Does the patient have an upcoming appointment?  Yes   If yes, When? 01/23/2024    Provider to refill: AB    Does the patients insurance require a 100 day supply?  No

## 2024-01-11 RX ORDER — OLMESARTAN MEDOXOMIL, AMLODIPINE AND HYDROCHLOROTHIAZIDE TABLET 20/5/12.5 MG 20; 5; 12.5 MG/1; MG/1; MG/1
1 TABLET ORAL
Qty: 13 TABLET | Refills: 0 | Status: SHIPPED | OUTPATIENT
Start: 2024-01-11 | End: 2024-01-23 | Stop reason: SDUPTHER

## 2024-01-23 ENCOUNTER — OFFICE VISIT (OUTPATIENT)
Dept: CARDIOLOGY | Facility: MEDICAL CENTER | Age: 64
End: 2024-01-23
Attending: NURSE PRACTITIONER
Payer: COMMERCIAL

## 2024-01-23 VITALS
DIASTOLIC BLOOD PRESSURE: 58 MMHG | BODY MASS INDEX: 30.92 KG/M2 | HEART RATE: 88 BPM | HEIGHT: 68 IN | WEIGHT: 204 LBS | SYSTOLIC BLOOD PRESSURE: 110 MMHG | OXYGEN SATURATION: 97 % | RESPIRATION RATE: 16 BRPM

## 2024-01-23 DIAGNOSIS — I10 ESSENTIAL HYPERTENSION, BENIGN: ICD-10-CM

## 2024-01-23 DIAGNOSIS — I73.9 PAD (PERIPHERAL ARTERY DISEASE) (HCC): ICD-10-CM

## 2024-01-23 DIAGNOSIS — U09.9 COVID-19 LONG HAULER: ICD-10-CM

## 2024-01-23 DIAGNOSIS — E78.5 DYSLIPIDEMIA: ICD-10-CM

## 2024-01-23 DIAGNOSIS — R06.09 DYSPNEA ON EXERTION: ICD-10-CM

## 2024-01-23 PROCEDURE — 3078F DIAST BP <80 MM HG: CPT | Performed by: NURSE PRACTITIONER

## 2024-01-23 PROCEDURE — 99214 OFFICE O/P EST MOD 30 MIN: CPT | Performed by: NURSE PRACTITIONER

## 2024-01-23 PROCEDURE — 99213 OFFICE O/P EST LOW 20 MIN: CPT | Performed by: NURSE PRACTITIONER

## 2024-01-23 PROCEDURE — 3074F SYST BP LT 130 MM HG: CPT | Performed by: NURSE PRACTITIONER

## 2024-01-23 RX ORDER — OLMESARTAN MEDOXOMIL, AMLODIPINE AND HYDROCHLOROTHIAZIDE TABLET 20/5/12.5 MG 20; 5; 12.5 MG/1; MG/1; MG/1
1 TABLET ORAL
Qty: 13 TABLET | Refills: 0 | Status: SHIPPED | OUTPATIENT
Start: 2024-01-23 | End: 2024-02-09

## 2024-01-23 RX ORDER — METOPROLOL SUCCINATE 25 MG/1
25 TABLET, EXTENDED RELEASE ORAL DAILY
Qty: 90 TABLET | Refills: 0 | Status: SHIPPED | OUTPATIENT
Start: 2024-01-23

## 2024-01-23 RX ORDER — ATORVASTATIN CALCIUM 80 MG/1
80 TABLET, FILM COATED ORAL DAILY
Qty: 90 TABLET | Refills: 0 | Status: SHIPPED | OUTPATIENT
Start: 2024-01-23

## 2024-01-23 ASSESSMENT — ENCOUNTER SYMPTOMS
LOSS OF CONSCIOUSNESS: 0
SHORTNESS OF BREATH: 0
DIZZINESS: 0
ORTHOPNEA: 0
PALPITATIONS: 0

## 2024-01-23 NOTE — PROGRESS NOTES
Chief Complaint   Patient presents with    Other     F/V Dx: Dyspnea on exertion       Subjective     Anette Jayleen Yost is a 63 y.o. female who presents today for her annual follow-up.    Patient has a history of peripheral artery disease (subclavian and ICA), tobacco abuse, hypertension, hyperlipidemia and shortness of breath related to long COVID.  Patient of Dr. Mauricio's last seen in clinic by Aminata Hartman on 12/5/2022.  At that time she was still smoking half to 1 pack of cigarettes a day.    Today in clinic patient reports that she still smoking some number cigarettes however has been putting them out earlier.  Recently getting over cough, on nebulizers and Mucomyst.  She has lost approximately 25 to 35 pounds over the last year and is following a low-salt diet.  She has been staying in [place] taking care of her mother and travels back to the area once every 2 months or so.  No other changes to her health, she was started on Cymbalta for chronic back pain and leg neuropathy.      Past Medical History:   Diagnosis Date    Arthritis     Chronic airway obstruction, not elsewhere classified     Dental disorder     dentures    Grave's disease     Hyperlipidemia     Hypertension     IBS (irritable bowel syndrome)     Long COVID 03/2023    Echocardiogram with normal LV size, mild concentric LVH, LVEF 70%. Normal RA, LA and RV. No valvular abnormalities.    PAD (peripheral artery disease) ([facility]) 04/2021    CTA of chest with occlusions of left subclavian artery, 50-70% of proximal right subclavian artery, 50-75% stenosis of left vertebral artery, 50% stenosis of LICA    Pain     lower back    Personal history of venous thrombosis and embolism 06/23/2009    right leg    Shortness of breath      Past Surgical History:   Procedure Laterality Date    RECONSTRUCTION, KNEE, ACL, ARTHROSCOPIC  7/9/2009    Performed by RENEE LEVI at SURGERY [facility] ORS    FUSION, SPINE, LUMBAR, PLIF  2004    PB  DISKECTOMY,PERCUTANEOUS LUMBAR  2001    HYSTERECTOMY, VAGINAL  1999    TUBAL LIGATION  1994    TONSILLECTOMY  1968     Family History   Problem Relation Age of Onset    Cancer Maternal Grandmother         breast     Cancer Maternal Aunt     Cancer Mother         mm     Cancer Father         colon cancer     Hypertension Sister     Diabetes Sister     Cancer Maternal Grandfather         barretts/lung     Cancer Paternal Grandmother     Lung Disease Paternal Grandfather     Cancer Paternal Grandfather         lung    Cancer Other         hodkins (remission)     Hypertension Brother     Diabetes Brother      Social History     Socioeconomic History    Marital status:      Spouse name: Not on file    Number of children: Not on file    Years of education: Not on file    Highest education level: Not on file   Occupational History    Not on file   Tobacco Use    Smoking status: Every Day     Current packs/day: 1.00     Average packs/day: 1 pack/day for 45.1 years (45.1 ttl pk-yrs)     Types: Cigarettes     Start date: 1979    Smokeless tobacco: Never    Tobacco comments:     10-20 cigarettes daily   Vaping Use    Vaping Use: Former   Substance and Sexual Activity    Alcohol use: No    Drug use: No    Sexual activity: Not Currently   Other Topics Concern    Not on file   Social History Narrative    Not on file     Social Determinants of Health     Financial Resource Strain: Not on file   Food Insecurity: Not on file   Transportation Needs: Not on file   Physical Activity: Not on file   Stress: Not on file   Social Connections: Not on file   Intimate Partner Violence: Not on file   Housing Stability: Not on file     Allergies   Allergen Reactions    Bee Anaphylaxis    Diphenoxylate Rash     .    Penicillins Shortness of Breath    Soap Hives    Tape Rash     .    Tetracycline Hives and Shortness of Breath     Outpatient Encounter Medications as of 1/23/2024   Medication Sig Dispense Refill    atorvastatin (LIPITOR) 80  MG tablet Take 1 Tablet by mouth every day. 90 Tablet 0    metoprolol SR (TOPROL XL) 25 MG TABLET SR 24 HR Take 1 Tablet by mouth every day. TO ENSURE FURTHER REFILLS, PLEASE KEEP  FOLLOW UP VISIT APPOINTMENT. THANK YOU! 90 Tablet 0    Olmesartan-amLODIPine-HCTZ 20-5-12.5 MG Tab Take 1 Tablet by mouth every day. 13 Tablet 0    diclofenac sodium 3 % Gel Apply 0.5 cm topically 1 time a day as needed. 100 g 0    lidocaine (LIDODERM) 5 % Patch Place 1 Patch on the skin every 24 hours. 10 Patch 0    BREZTRI AEROSPHERE 160-9-4.8 MCG/ACT Aerosol 2 times a day.      montelukast (SINGULAIR) 10 MG Tab       traZODone (DESYREL) 100 MG Tab       albuterol (PROVENTIL) 2.5mg/3ml Nebu Soln solution for nebulization       sumatriptan (IMITREX) 100 MG tablet Take 100 mg by mouth Once PRN for Migraine.      VENTOLIN  (90 Base) MCG/ACT Aero Soln inhalation aerosol Inhale 2 Puffs by mouth every four hours as needed for Shortness of Breath.  5    dicyclomine (BENTYL) 20 MG Tab Take 20 mg by mouth every 8 hours as needed (For cramps).      aspirin (ASA) 81 MG Chew Tab chewable tablet Take 81 mg by mouth every day.      ondansetron (ZOFRAN ODT) 8 MG TABLET DISPERSIBLE Take 8 mg by mouth every 6 hours as needed for Nausea.  6    promethazine (PHENERGAN) 25 MG Tab Take 25 mg by mouth every 6 hours as needed for Nausea/Vomiting.      fluticasone (FLONASE) 50 MCG/ACT nasal spray Spray 1 Spray in nose every day.      CYCLOBENZAPRINE HCL 10 MG PO TABS Take 1 Tab by mouth 3 times a day.      HYDROCODONE-ACETAMINOPHEN  MG PO TABS Take 1 Tab by mouth 3 times a day.      [DISCONTINUED] Olmesartan-amLODIPine-HCTZ 20-5-12.5 MG Tab TAKE 1 TABLET BY MOUTH EVERY DAY 13 Tablet 0    [DISCONTINUED] metoprolol SR (TOPROL XL) 25 MG TABLET SR 24 HR Take 1 Tablet by mouth every day. TO ENSURE FURTHER REFILLS, PLEASE KEEP  FOLLOW UP VISIT APPOINTMENT. THANK YOU! 90 Tablet 0    [DISCONTINUED] atorvastatin (LIPITOR) 80 MG tablet Take 1 Tablet by  "mouth every day. 90 Tablet 0     No facility-administered encounter medications on file as of 1/23/2024.     Review of Systems   Respiratory:  Negative for shortness of breath.    Cardiovascular:  Negative for chest pain, palpitations, orthopnea and leg swelling.   Neurological:  Negative for dizziness and loss of consciousness.   All other systems reviewed and are negative.             Objective     /58 (BP Location: Right arm, Patient Position: Sitting, BP Cuff Size: Adult)   Pulse 88   Resp 16   Ht 1.727 m (5' 8\")   Wt 92.5 kg (204 lb)   SpO2 97%   BMI 31.02 kg/m²     Physical Exam  Vitals reviewed.   Constitutional:       General: She is not in acute distress.     Appearance: She is normal weight.   Cardiovascular:      Rate and Rhythm: Normal rate and regular rhythm.      Heart sounds: No murmur heard.  Pulmonary:      Effort: Pulmonary effort is normal. No respiratory distress.      Breath sounds: Examination of the right-lower field reveals rhonchi. Examination of the left-lower field reveals rhonchi. Rhonchi present.   Abdominal:      General: There is no distension.      Tenderness: There is no abdominal tenderness.   Musculoskeletal:      Cervical back: Normal range of motion.      Right lower leg: No edema.      Left lower leg: No edema.   Neurological:      General: No focal deficit present.      Mental Status: She is alert.            Lab Results   Component Value Date/Time    CHOLSTRLTOT 167 03/28/2023 03:25 PM    LDL 70 03/28/2023 03:25 PM    HDL 45 03/28/2023 03:25 PM    TRIGLYCERIDE 258 (H) 03/28/2023 03:25 PM       Lab Results   Component Value Date/Time    SODIUM 137 03/28/2023 03:25 PM    POTASSIUM 4.0 03/28/2023 03:25 PM    CHLORIDE 99 03/28/2023 03:25 PM    CO2 24 03/28/2023 03:25 PM    GLUCOSE 111 (H) 03/28/2023 03:25 PM    BUN 14 03/28/2023 03:25 PM    CREATININE 1.15 03/28/2023 03:25 PM     Lab Results   Component Value Date/Time    ALKPHOSPHAT 106 (H) 04/24/2020 01:07 PM    " ASTSGOT 17 04/24/2020 01:07 PM    ALTSGPT 16 04/24/2020 01:07 PM    TBILIRUBIN 0.4 04/24/2020 01:07 PM      CTA neck with and without 3/30/2023  IMPRESSION:     1.  Mild atherosclerotic plaquing involving the origins of the internal carotid arteries with less than 50% stenosis.     2.  Chronic atherosclerotic occlusion of the prevertebral segment of the left subclavian artery and the possibility of subclavian steal syndrome should be considered.     3.  No significant stenosis of the vertebral or basilar arteries.     4.  60% focal stenosis of the post vertebral segment of the right subclavian artery.    Echocardiogram 3/30/2023  CONCLUSIONS  Prior study 3/4/21, compared to the report of the prior study, there   has been no significant change.   Normal left ventricular systolic function.  The left ventricular ejection fraction is visually estimated to be 70%.  The right ventricle is normal in size and systolic function.  No significant valvular abnormalities.   Unable to estimate right ventricular systolic pressure due to an   inadequate tricuspid regurgitant jet    Assessment & Plan     1. Dyspnea on exertion        2. Dyslipidemia  atorvastatin (LIPITOR) 80 MG tablet    Lipid Profile      3. Essential hypertension, benign  metoprolol SR (TOPROL XL) 25 MG TABLET SR 24 HR    Olmesartan-amLODIPine-HCTZ 20-5-12.5 MG Tab    Comp Metabolic Panel      4. PAD (peripheral artery disease) (HCC)        5. COVID-19 long hauler            Medical Decision Making: Today's Assessment/Status/Plan:          1. Dyspnea on exertion, ongoing, with long-Covid symptoms.  Recent upper respiratory illness as worsened this.  She did get a chest CT and echocardiogram since her last visit which did not show anything new or significant.  Encouraged smoking cessation, patient is trying to cut back as best can.     2. Hypertension, treated with TriBenZocr and Metoprolol.  Did discuss the Tribenzocr, patient states that it is more expensive at  [facility], discussed splitting the send continue 2 to 3 pills although unsure if this would improve cost.  Will keep prescription the same for now patient instructed to notify us if she would like changed this.     3. Hyperlipidemia, just restarted Lipitor 80mg.  Repeat lipid panel pending    4. Mild asthma, with smoking history. She is not interested in quitting smoking right now; she is urged to try to cut down.     5. PAD, subclavian and ICA stenosis.  Repeat CTA of the neck shows carotid stenosis less than 50%, her subclavian stenosis again noted.  Previously patient did see Dr. Leong from vascular, he did not want to proceed with intervention as he was concerned that patient would not be able to tolerate procedure and lay flat for extended period of time.  She is not interested in seeing them again at this time.     Follow-up annually or sooner if needed.    Nelli Eduardo, MSN, APRN  [facility]  [phone]    Please note this dictation was created using voice recognition software.  I have made every reasonable attempt to correct obvious errors, but there may be errors of grammar and possibly content that I did not discover before finalizing the note.

## 2024-02-08 DIAGNOSIS — I10 ESSENTIAL HYPERTENSION, BENIGN: ICD-10-CM

## 2024-02-08 NOTE — TELEPHONE ENCOUNTER
Is the patient due for a refill? Yes    Was the patient seen the past year? Yes    Date of last office visit: 01/23/2024    Does the patient have an upcoming appointment?  No   If yes, When?     Provider to refill:DB    Does the patients insurance require a 100 day supply?  No

## 2024-02-09 RX ORDER — OLMESARTAN MEDOXOMIL, AMLODIPINE AND HYDROCHLOROTHIAZIDE TABLET 20/5/12.5 MG 20; 5; 12.5 MG/1; MG/1; MG/1
1 TABLET ORAL
Qty: 90 TABLET | Refills: 3 | Status: SHIPPED | OUTPATIENT
Start: 2024-02-09 | End: 2024-02-29 | Stop reason: SDUPTHER

## 2024-02-16 ENCOUNTER — HOSPITAL ENCOUNTER (OUTPATIENT)
Dept: LAB | Facility: MEDICAL CENTER | Age: 64
End: 2024-02-16
Attending: NURSE PRACTITIONER
Payer: COMMERCIAL

## 2024-02-16 DIAGNOSIS — E78.5 DYSLIPIDEMIA: ICD-10-CM

## 2024-02-16 DIAGNOSIS — I10 ESSENTIAL HYPERTENSION, BENIGN: ICD-10-CM

## 2024-02-16 LAB
ALBUMIN SERPL BCP-MCNC: 4.4 G/DL (ref 3.2–4.9)
ALBUMIN/GLOB SERPL: 1.6 G/DL
ALP SERPL-CCNC: 101 U/L (ref 30–99)
ALT SERPL-CCNC: 11 U/L (ref 2–50)
ANION GAP SERPL CALC-SCNC: 15 MMOL/L (ref 7–16)
AST SERPL-CCNC: 14 U/L (ref 12–45)
BILIRUB SERPL-MCNC: 0.3 MG/DL (ref 0.1–1.5)
BUN SERPL-MCNC: 20 MG/DL (ref 8–22)
CALCIUM ALBUM COR SERPL-MCNC: 9 MG/DL (ref 8.5–10.5)
CALCIUM SERPL-MCNC: 9.3 MG/DL (ref 8.4–10.2)
CHLORIDE SERPL-SCNC: 100 MMOL/L (ref 96–112)
CHOLEST SERPL-MCNC: 223 MG/DL (ref 100–199)
CO2 SERPL-SCNC: 24 MMOL/L (ref 20–33)
CREAT SERPL-MCNC: 1.02 MG/DL (ref 0.5–1.4)
FASTING STATUS PATIENT QL REPORTED: NORMAL
GFR SERPLBLD CREATININE-BSD FMLA CKD-EPI: 62 ML/MIN/1.73 M 2
GLOBULIN SER CALC-MCNC: 2.7 G/DL (ref 1.9–3.5)
GLUCOSE SERPL-MCNC: 124 MG/DL (ref 65–99)
HDLC SERPL-MCNC: 67 MG/DL
LDLC SERPL CALC-MCNC: 135 MG/DL
POTASSIUM SERPL-SCNC: 3.9 MMOL/L (ref 3.6–5.5)
PROT SERPL-MCNC: 7.1 G/DL (ref 6–8.2)
SODIUM SERPL-SCNC: 139 MMOL/L (ref 135–145)
TRIGL SERPL-MCNC: 104 MG/DL (ref 0–149)

## 2024-02-16 PROCEDURE — 36415 COLL VENOUS BLD VENIPUNCTURE: CPT

## 2024-02-16 PROCEDURE — 80053 COMPREHEN METABOLIC PANEL: CPT

## 2024-02-16 PROCEDURE — 80061 LIPID PANEL: CPT

## 2024-02-20 NOTE — RESULT ENCOUNTER NOTE
Chrissy Cheema,  Looks like your cholesterol numbers show that your triglycerides are better but your LDL is a little higher. Please continue to take your atorvastatin and we should probably recheck your cholesterol numbers in a few months.   Thank you

## 2024-02-29 DIAGNOSIS — I10 ESSENTIAL HYPERTENSION, BENIGN: ICD-10-CM

## 2024-02-29 NOTE — TELEPHONE ENCOUNTER
DB        Received request via: Patient    Was the patient seen in the last year in this department? Yes    Does the patient have an active prescription (recently filled or refills available) for medication(s) requested? Yes.       Pharmacy Name: [facility] at [address] [place] [address]    (Patient stated they received an order for the Olmesartan medication but it was canceled and she out of her medication)    Does the patient have assisted Plus and need 100 day supply (blood pressure, diabetes and cholesterol meds only)? Patient does not have SCP    Thank you    -Elliot JENKINS

## 2024-03-01 RX ORDER — OLMESARTAN MEDOXOMIL, AMLODIPINE AND HYDROCHLOROTHIAZIDE TABLET 20/5/12.5 MG 20; 5; 12.5 MG/1; MG/1; MG/1
1 TABLET ORAL
Qty: 90 TABLET | Refills: 3 | Status: SHIPPED | OUTPATIENT
Start: 2024-03-01

## 2024-05-09 DIAGNOSIS — E78.5 DYSLIPIDEMIA: ICD-10-CM

## 2024-05-09 DIAGNOSIS — I10 ESSENTIAL HYPERTENSION, BENIGN: ICD-10-CM

## 2024-05-10 NOTE — TELEPHONE ENCOUNTER
Is the patient due for a refill? Yes    Was the patient seen the past year? Yes    Date of last office visit: 01/23/2024    Does the patient have an upcoming appointment?  No    Provider to refill:DB     Does the patients insurance require a 100 day supply?  No

## 2024-05-16 RX ORDER — METOPROLOL SUCCINATE 25 MG/1
25 TABLET, EXTENDED RELEASE ORAL
Qty: 90 TABLET | Refills: 2 | Status: SHIPPED | OUTPATIENT
Start: 2024-05-16

## 2024-05-16 RX ORDER — ATORVASTATIN CALCIUM 80 MG/1
80 TABLET, FILM COATED ORAL DAILY
Qty: 90 TABLET | Refills: 2 | Status: SHIPPED | OUTPATIENT
Start: 2024-05-16

## 2024-07-12 DIAGNOSIS — I10 ESSENTIAL HYPERTENSION, BENIGN: ICD-10-CM

## 2024-07-15 RX ORDER — OLMESARTAN MEDOXOMIL, AMLODIPINE AND HYDROCHLOROTHIAZIDE TABLET 20/5/12.5 MG 20; 5; 12.5 MG/1; MG/1; MG/1
1 TABLET ORAL
Qty: 100 TABLET | OUTPATIENT
Start: 2024-07-15

## 2025-03-17 DIAGNOSIS — E78.5 DYSLIPIDEMIA: ICD-10-CM

## 2025-03-17 DIAGNOSIS — I10 ESSENTIAL HYPERTENSION, BENIGN: ICD-10-CM

## 2025-03-17 NOTE — TELEPHONE ENCOUNTER
ADD    Received request via: Patient    Was the patient seen in the last year in this department? No  Scheduled 4/25/25    Does the patient have an active prescription (recently filled or refills available) for medication(s) requested? No    Pharmacy Name:     [id number] - [place], [place] - [address] AT [facility] [address] [[id number]]      Olmesartan-amLODIPine-HCTZ 20-5-12.5 MG Tab    metoprolol SR (TOPROL XL) 25 MG TABLET SR 24 HR    atorvastatin (LIPITOR) 80 MG tablet    The patientis is completely out of these medications.       Does the patient have longterm Plus and need 100-day supply? (This applies to ALL medications) Patient does not have SCP     Thank you,  Mukund BOSTON

## 2025-03-18 DIAGNOSIS — E78.5 DYSLIPIDEMIA: ICD-10-CM

## 2025-03-18 DIAGNOSIS — I10 ESSENTIAL HYPERTENSION, BENIGN: ICD-10-CM

## 2025-03-18 RX ORDER — METOPROLOL SUCCINATE 25 MG/1
25 TABLET, EXTENDED RELEASE ORAL
Qty: 90 TABLET | Refills: 0 | Status: SHIPPED | OUTPATIENT
Start: 2025-03-18

## 2025-03-18 RX ORDER — ATORVASTATIN CALCIUM 80 MG/1
80 TABLET, FILM COATED ORAL DAILY
Qty: 90 TABLET | Refills: 0 | Status: SHIPPED | OUTPATIENT
Start: 2025-03-18

## 2025-03-18 RX ORDER — OLMESARTAN MEDOXOMIL, AMLODIPINE AND HYDROCHLOROTHIAZIDE TABLET 20/5/12.5 MG 20; 5; 12.5 MG/1; MG/1; MG/1
1 TABLET ORAL
Qty: 90 TABLET | Refills: 0 | Status: SHIPPED | OUTPATIENT
Start: 2025-03-18

## 2025-04-25 ENCOUNTER — APPOINTMENT (OUTPATIENT)
Dept: CARDIOLOGY | Facility: MEDICAL CENTER | Age: 65
End: 2025-04-25
Attending: INTERNAL MEDICINE
Payer: COMMERCIAL

## 2025-05-08 ENCOUNTER — APPOINTMENT (OUTPATIENT)
Dept: CARDIOLOGY | Facility: MEDICAL CENTER | Age: 65
End: 2025-05-08
Attending: NURSE PRACTITIONER
Payer: COMMERCIAL

## 2025-05-29 ENCOUNTER — OFFICE VISIT (OUTPATIENT)
Dept: CARDIOLOGY | Facility: MEDICAL CENTER | Age: 65
End: 2025-05-29
Payer: COMMERCIAL

## 2025-05-29 ENCOUNTER — TELEPHONE (OUTPATIENT)
Dept: CARDIOLOGY | Facility: MEDICAL CENTER | Age: 65
End: 2025-05-29

## 2025-05-29 VITALS
SYSTOLIC BLOOD PRESSURE: 70 MMHG | DIASTOLIC BLOOD PRESSURE: 40 MMHG | HEIGHT: 68 IN | RESPIRATION RATE: 18 BRPM | HEART RATE: 98 BPM | BODY MASS INDEX: 30.46 KG/M2 | OXYGEN SATURATION: 95 % | WEIGHT: 201 LBS

## 2025-05-29 DIAGNOSIS — E78.5 DYSLIPIDEMIA: Primary | ICD-10-CM

## 2025-05-29 DIAGNOSIS — I65.23 BILATERAL CAROTID ARTERY STENOSIS: ICD-10-CM

## 2025-05-29 DIAGNOSIS — I77.1 SUBCLAVIAN ARTERY STENOSIS, RIGHT (HCC): ICD-10-CM

## 2025-05-29 DIAGNOSIS — I10 ESSENTIAL HYPERTENSION, BENIGN: ICD-10-CM

## 2025-05-29 DIAGNOSIS — I73.9 PAD (PERIPHERAL ARTERY DISEASE) (HCC): ICD-10-CM

## 2025-05-29 PROBLEM — U07.1 PNEUMONIA DUE TO COVID-19 VIRUS: Status: RESOLVED | Noted: 2021-02-09 | Resolved: 2025-05-29

## 2025-05-29 PROBLEM — R07.2 PRECORDIAL PAIN: Status: RESOLVED | Noted: 2021-06-03 | Resolved: 2025-05-29

## 2025-05-29 PROBLEM — J12.82 PNEUMONIA DUE TO COVID-19 VIRUS: Status: RESOLVED | Noted: 2021-02-09 | Resolved: 2025-05-29

## 2025-05-29 PROCEDURE — 99213 OFFICE O/P EST LOW 20 MIN: CPT

## 2025-05-29 RX ORDER — OLMESARTAN MEDOXOMIL 20 MG/1
20 TABLET ORAL DAILY
Qty: 100 TABLET | Refills: 3 | Status: SHIPPED | OUTPATIENT
Start: 2025-05-29 | End: 2026-07-03

## 2025-05-29 RX ORDER — OLMESARTAN MEDOXOMIL 20 MG/1
20 TABLET ORAL DAILY
Qty: 100 TABLET | Refills: 3 | Status: SHIPPED | OUTPATIENT
Start: 2025-05-29 | End: 2025-05-29

## 2025-05-29 RX ORDER — ATORVASTATIN CALCIUM 80 MG/1
80 TABLET, FILM COATED ORAL DAILY
Qty: 100 TABLET | Refills: 3 | Status: SHIPPED | OUTPATIENT
Start: 2025-05-29

## 2025-05-29 RX ORDER — LIDOCAINE 5% 5 G/100G
CREAM TOPICAL PRN
COMMUNITY

## 2025-05-29 ASSESSMENT — ENCOUNTER SYMPTOMS
NERVOUS/ANXIOUS: 0
DEPRESSION: 0
PND: 0
PALPITATIONS: 0
DIZZINESS: 1
ORTHOPNEA: 0
BACK PAIN: 1
INSOMNIA: 1
SHORTNESS OF BREATH: 1
EYES NEGATIVE: 1
WEIGHT LOSS: 1
GASTROINTESTINAL NEGATIVE: 1

## 2025-05-29 NOTE — TELEPHONE ENCOUNTER
Per  request, to fax over labs to [facility]. Pt will have labs drawn soon and provided fax number to fax over labs. Received confirmation report and scanned to [facility] [facility].

## 2025-05-29 NOTE — PROGRESS NOTES
Chief Complaint   Patient presents with    Follow-Up     Dyspnea on exertion    Hypertension    Dyslipidemia       Subjective     Anette HintonJcPriyank is a 65 y.o. female who presents today for follow-up.  She has a history of PAD (subclavian and ICA), tobacco abuse, HTN, HLD, long COVID with chronic shortness of breath.    Former patient of Nelli MAHAJAN last seen by her on 1/23/2024, at that visit she was not having any significant cardiovascular symptoms other than chronic KRUEGER, meds refilled and labs ordered    5/29/2025 she presents today with her mom.  She has not been doing well, having low blood pressures at home, dizziness, sleeping 20 hours a day but also plagued with insomnia, not eating regularly, chronic back pain which causes muscle spasms, fatigue, and chronic shortness of breath, no chest pain or palpitations or lower extremity edema.    Past Medical History[1]  Past Surgical History[2]  Family History   Problem Relation Age of Onset    Cancer Maternal Grandmother         breast     Cancer Maternal Aunt     Cancer Mother         mm     Cancer Father         colon cancer     Hypertension Sister     Diabetes Sister     Cancer Maternal Grandfather         barretts/lung     Cancer Paternal Grandmother     Lung Disease Paternal Grandfather     Cancer Paternal Grandfather         lung    Cancer Other         hodkins (remission)     Hypertension Brother     Diabetes Brother      Social History     Socioeconomic History    Marital status:      Spouse name: Not on file    Number of children: Not on file    Years of education: Not on file    Highest education level: Not on file   Occupational History    Not on file   Tobacco Use    Smoking status: Every Day     Current packs/day: 1.00     Average packs/day: 1 pack/day for 46.4 years (46.4 ttl pk-yrs)     Types: Cigarettes     Start date: 1979    Smokeless tobacco: Never    Tobacco comments:     10-20 cigarettes daily   Vaping Use    Vaping  "status: Former   Substance and Sexual Activity    Alcohol use: No    Drug use: No    Sexual activity: Not Currently   Other Topics Concern    Not on file   Social History Narrative    Not on file     Social Drivers of Health     Financial Resource Strain: Not on file   Food Insecurity: Not on file   Transportation Needs: Not on file   Physical Activity: Not on file   Stress: Not on file   Social Connections: Not on file   Intimate Partner Violence: Not on file   Housing Stability: Not on file     Allergies[3]  Encounter Medications[4]  Review of Systems   Constitutional:  Positive for malaise/fatigue and weight loss.   HENT: Negative.     Eyes: Negative.    Respiratory:  Positive for shortness of breath.    Cardiovascular:  Negative for chest pain, palpitations, orthopnea, leg swelling and PND.   Gastrointestinal: Negative.    Genitourinary: Negative.    Musculoskeletal:  Positive for back pain.   Skin: Negative.    Neurological:  Positive for dizziness.   Endo/Heme/Allergies: Negative.    Psychiatric/Behavioral:  Negative for depression. The patient has insomnia. The patient is not nervous/anxious.               Objective     BP (!) 70/40 (BP Location: Left arm, Patient Position: Sitting)   Pulse 98   Resp 18   Ht 1.727 m (5' 8\")   Wt 91.2 kg (201 lb)   SpO2 95%   BMI 30.56 kg/m²     Physical Exam  Constitutional:       Appearance: Normal appearance.   HENT:      Head: Normocephalic and atraumatic.   Neck:      Vascular: No JVD.   Cardiovascular:      Rate and Rhythm: Normal rate and regular rhythm.      Pulses: Normal pulses.      Heart sounds: Normal heart sounds. No murmur heard.     No friction rub.   Pulmonary:      Effort: Pulmonary effort is normal. No respiratory distress.      Breath sounds: Normal breath sounds.   Abdominal:      General: There is no distension.      Palpations: Abdomen is soft.   Musculoskeletal:      Right lower leg: No edema.      Left lower leg: No edema.   Skin:     General: " Skin is warm and dry.   Neurological:      General: No focal deficit present.      Mental Status: She is alert and oriented to person, place, and time.   Psychiatric:         Mood and Affect: Mood normal.         Behavior: Behavior normal.            Lab Results   Component Value Date/Time    WBC 18.8 (H) 05/31/2019 06:30 PM    RBC 5.23 05/31/2019 06:30 PM    HEMOGLOBIN 17.2 (H) 05/31/2019 06:30 PM    HEMATOCRIT 51.8 (H) 05/31/2019 06:30 PM    MCV 99.0 (H) 05/31/2019 06:30 PM    MCH 32.9 05/31/2019 06:30 PM    MCHC 33.2 (L) 05/31/2019 06:30 PM    MPV 10.9 05/31/2019 06:30 PM    NEUTSPOLYS 70.80 05/31/2019 06:30 PM    LYMPHOCYTES 20.10 (L) 05/31/2019 06:30 PM    MONOCYTES 6.50 05/31/2019 06:30 PM    EOSINOPHILS 1.20 05/31/2019 06:30 PM    BASOPHILS 0.50 05/31/2019 06:30 PM      Lab Results   Component Value Date/Time    CHOLSTRLTOT 223 (H) 02/16/2024 12:02 PM     (H) 02/16/2024 12:02 PM    HDL 67 02/16/2024 12:02 PM    TRIGLYCERIDE 104 02/16/2024 12:02 PM       Lab Results   Component Value Date/Time    SODIUM 139 02/16/2024 12:02 PM    POTASSIUM 3.9 02/16/2024 12:02 PM    CHLORIDE 100 02/16/2024 12:02 PM    CO2 24 02/16/2024 12:02 PM    GLUCOSE 124 (H) 02/16/2024 12:02 PM    BUN 20 02/16/2024 12:02 PM    CREATININE 1.02 02/16/2024 12:02 PM     Lab Results   Component Value Date/Time    ALKPHOSPHAT 101 (H) 02/16/2024 12:02 PM    ASTSGOT 14 02/16/2024 12:02 PM    ALTSGPT 11 02/16/2024 12:02 PM    TBILIRUBIN 0.3 02/16/2024 12:02 PM      Ultrasound upper extremity artery 12/3/2021  Severe (>75%) right subclavian arterial stenosis, based on flow veocity    criteria; however, normal triphasic flow is maintained throughout the right    upper extremity arterial  system.    Echocardiogram 3/30/2023  CONCLUSIONS  Prior study 3/4/21, compared to the report of the prior study, there   has been no significant change.   Normal left ventricular systolic function.  The left ventricular ejection fraction is visually estimated  "to be 70%.  The right ventricle is normal in size and systolic function.  No significant valvular abnormalities.   Unable to estimate right ventricular systolic pressure due to an   inadequate tricuspid regurgitant jet.       Assessment & Plan     1. Dyslipidemia        2. Essential hypertension, benign        3. PAD (peripheral artery disease) (HCC)        4. Severe subclavian artery stenosis (greater than 75%)            Medical Decision Making: Today's Assessment/Status/Plan:        Hypertension  - Low blood pressures today in the office and at home  -She does have severe right subclavian stenosis and complete occlusion of left subclavian artery, possibly affecting blood pressure readings  -Stop metoprolol and olmesartan-amlodipine-hydrochlorothiazide in favor of olmesartan alone, 20 mg daily  -Monitor blood pressure daily checking before and after olmesartan administration and holding for systolic less than 100  - goal < 130/80  - Check in in 2 weeks with home blood pressure readings  -She does report dizziness today and with her current blood pressure readings I have recommended that she go to the ER for evaluation, she declines     Severe right subclavian artery stenosis  Hyperlipidemia   -Most recent   -Continue atorvastatin 80 mg daily  -Goal of less than 70  -Check lipid panel, also ordered CBC and BMP   - She did have a CT scan done back in March 2023 that showed \"scattered coronary artery calcifications\"  - Repeat ultrasounds of carotid and upper extremities    Reports that she has been living up in [place] but is planning to get her labs drawn at [facility]    Follow-up in 6 months    This note was dictated using Dragon speech recognition software.                       [1]   Past Medical History:  Diagnosis Date    Arthritis     Chronic airway obstruction, not elsewhere classified     Dental disorder     dentures    Grave's disease     Hyperlipidemia     Hypertension     IBS (irritable " bowel syndrome)     Long COVID 03/2023    Echocardiogram with normal LV size, mild concentric LVH, LVEF 70%. Normal RA, LA and RV. No valvular abnormalities.    PAD (peripheral artery disease) (HCC) 04/2021    CTA of chest with occlusions of left subclavian artery, 50-70% of proximal right subclavian artery, 50-75% stenosis of left vertebral artery, 50% stenosis of LICA    Pain     lower back    Personal history of venous thrombosis and embolism 06/23/2009    right leg    Shortness of breath    [2]   Past Surgical History:  Procedure Laterality Date    RECONSTRUCTION, KNEE, ACL, ARTHROSCOPIC  7/9/2009    Performed by RENEE LEVI at SURGERY [facility] ORS    FUSION, SPINE, LUMBAR, PLIF  2004    PB DISKECTOMY,PERCUTANEOUS LUMBAR  2001    HYSTERECTOMY, VAGINAL  1999    TUBAL LIGATION  1994    TONSILLECTOMY  1968   [3]   Allergies  Allergen Reactions    Bee Anaphylaxis    Diphenoxylate Rash     .    Penicillins Shortness of Breath    Soap Hives    Tape Rash     .    Tetracycline Hives and Shortness of Breath   [4]   Outpatient Encounter Medications as of 5/29/2025   Medication Sig Dispense Refill    Lidocaine 5 % Cream Apply  topically as needed.      metoprolol SR (TOPROL XL) 25 MG TABLET SR 24 HR Take 1 Tablet by mouth every day. 90 Tablet 0    atorvastatin (LIPITOR) 80 MG tablet Take 1 Tablet by mouth every day. 90 Tablet 0    diclofenac sodium 3 % Gel Apply 0.5 cm topically 1 time a day as needed. 100 g 0    BREZTRI AEROSPHERE 160-9-4.8 MCG/ACT Aerosol 2 times a day.      montelukast (SINGULAIR) 10 MG Tab       albuterol (PROVENTIL) 2.5mg/3ml Nebu Soln solution for nebulization  (Patient taking differently: as needed.)      dicyclomine (BENTYL) 20 MG Tab Take 20 mg by mouth every 8 hours as needed (For cramps).      aspirin (ASA) 81 MG Chew Tab chewable tablet Take 81 mg by mouth every day.      fluticasone (FLONASE) 50 MCG/ACT nasal spray Spray 1 Spray in nose every day. (Patient taking differently:  Administer 1 Spray into affected nostril(S) as needed.)      CYCLOBENZAPRINE HCL 10 MG PO TABS Take 1 Tab by mouth 3 times a day.      HYDROCODONE-ACETAMINOPHEN  MG PO TABS Take 1 Tab by mouth 3 times a day.      Olmesartan-amLODIPine-HCTZ 20-5-12.5 MG Tab Take 1 Tablet by mouth every day. 90 Tablet 0    lidocaine (LIDODERM) 5 % Patch Place 1 Patch on the skin every 24 hours. (Patient not taking: Reported on 5/29/2025) 10 Patch 0    traZODone (DESYREL) 100 MG Tab       sumatriptan (IMITREX) 100 MG tablet Take 100 mg by mouth Once PRN for Migraine.      VENTOLIN  (90 Base) MCG/ACT Aero Soln inhalation aerosol Inhale 2 Puffs by mouth every four hours as needed for Shortness of Breath.  5    ondansetron (ZOFRAN ODT) 8 MG TABLET DISPERSIBLE Take 8 mg by mouth every 6 hours as needed for Nausea.  6    promethazine (PHENERGAN) 25 MG Tab Take 25 mg by mouth every 6 hours as needed for Nausea/Vomiting. (Patient not taking: Reported on 5/29/2025)       No facility-administered encounter medications on file as of 5/29/2025.

## 2025-05-30 ENCOUNTER — HOSPITAL ENCOUNTER (OUTPATIENT)
Dept: RADIOLOGY | Facility: MEDICAL CENTER | Age: 65
End: 2025-05-30
Attending: PHYSICAL MEDICINE & REHABILITATION
Payer: COMMERCIAL

## 2025-05-30 DIAGNOSIS — M47.816 LUMBAR SPONDYLOSIS: ICD-10-CM

## 2025-05-30 DIAGNOSIS — M54.16 LUMBAR RADICULOPATHY: ICD-10-CM

## 2025-05-30 LAB
CHOLEST SERPL-MCNC: 187 MG/DL
HDLC SERPL-MCNC: 59 MG/DL
LDLC SERPL CALC-MCNC: 97 MG/DL
TRIGL SERPL-MCNC: 156 MG/DL

## 2025-05-30 PROCEDURE — 72148 MRI LUMBAR SPINE W/O DYE: CPT

## 2025-06-02 ENCOUNTER — RESULTS FOLLOW-UP (OUTPATIENT)
Dept: CARDIOLOGY | Facility: MEDICAL CENTER | Age: 65
End: 2025-06-02
Payer: COMMERCIAL

## 2025-06-02 DIAGNOSIS — E78.5 DYSLIPIDEMIA: Primary | ICD-10-CM

## 2025-06-02 DIAGNOSIS — I10 ESSENTIAL HYPERTENSION, BENIGN: ICD-10-CM

## 2025-06-02 RX ORDER — EZETIMIBE 10 MG/1
10 TABLET ORAL DAILY
Qty: 90 TABLET | Refills: 3 | Status: SHIPPED | OUTPATIENT
Start: 2025-06-02

## 2025-06-02 NOTE — TELEPHONE ENCOUNTER
----- Message from Nurse Practitioner TREMAINE Winters sent at 6/2/2025  1:24 PM PDT -----  LDL is above desired goal of less than 70, recommend adding ezetimibe 10 mg daily and rechecking lipid panel in 3 months.  Also of note creatinine was elevated, recommend following up with PCP  ----- Message -----  From: Dayna Jorge  Sent: 6/2/2025   9:45 AM PDT  To: TREMAINE Pompa

## 2025-06-02 NOTE — TELEPHONE ENCOUNTER
Phone Number Called: [phone]      Call outcome: Spoke to patient regarding message below.    Message: Called to inform patient of  recommendations to add Zetia 10 mg daily and recheck her lipid panel in 3 months. Zetia prescription sent. Lab order placed and mailed paper copy to patient. Pt verbalized understanding.